# Patient Record
Sex: MALE | Race: WHITE | NOT HISPANIC OR LATINO | Employment: FULL TIME | URBAN - METROPOLITAN AREA
[De-identification: names, ages, dates, MRNs, and addresses within clinical notes are randomized per-mention and may not be internally consistent; named-entity substitution may affect disease eponyms.]

---

## 2017-01-03 ENCOUNTER — GENERIC CONVERSION - ENCOUNTER (OUTPATIENT)
Dept: OTHER | Facility: OTHER | Age: 37
End: 2017-01-03

## 2017-01-03 ENCOUNTER — APPOINTMENT (OUTPATIENT)
Dept: LAB | Facility: MEDICAL CENTER | Age: 37
End: 2017-01-03
Payer: COMMERCIAL

## 2017-01-03 ENCOUNTER — ALLSCRIPTS OFFICE VISIT (OUTPATIENT)
Dept: OTHER | Facility: OTHER | Age: 37
End: 2017-01-03

## 2017-01-03 ENCOUNTER — HOSPITAL ENCOUNTER (OUTPATIENT)
Dept: MRI IMAGING | Facility: HOSPITAL | Age: 37
Discharge: HOME | End: 2017-01-03
Payer: COMMERCIAL

## 2017-01-03 DIAGNOSIS — S20.219A CONTUSION OF FRONT WALL OF THORAX: ICD-10-CM

## 2017-01-03 DIAGNOSIS — K76.9 LIVER LESION: ICD-10-CM

## 2017-01-03 DIAGNOSIS — C73 MALIGNANT NEOPLASM OF THYROID GLAND (HCC): ICD-10-CM

## 2017-01-03 LAB
ALBUMIN SERPL BCP-MCNC: 4.4 G/DL (ref 3.5–5)
ALP SERPL-CCNC: 104 U/L (ref 46–116)
ALT SERPL W P-5'-P-CCNC: 50 U/L (ref 12–78)
ANION GAP SERPL CALCULATED.3IONS-SCNC: 5 MMOL/L (ref 4–13)
AST SERPL W P-5'-P-CCNC: 15 U/L (ref 5–45)
BASOPHILS # BLD AUTO: 0.02 THOUSANDS/ΜL (ref 0–0.1)
BASOPHILS NFR BLD AUTO: 0 % (ref 0–1)
BILIRUB SERPL-MCNC: 0.4 MG/DL (ref 0.2–1)
BUN SERPL-MCNC: 19 MG/DL (ref 5–25)
CA-I BLD-SCNC: 1.19 MMOL/L (ref 1.12–1.32)
CALCIUM SERPL-MCNC: 9.2 MG/DL (ref 8.3–10.1)
CHLORIDE SERPL-SCNC: 105 MMOL/L (ref 100–108)
CO2 SERPL-SCNC: 28 MMOL/L (ref 21–32)
CREAT SERPL-MCNC: 0.84 MG/DL (ref 0.6–1.3)
EOSINOPHIL # BLD AUTO: 0.28 THOUSAND/ΜL (ref 0–0.61)
EOSINOPHIL NFR BLD AUTO: 4 % (ref 0–6)
ERYTHROCYTE [DISTWIDTH] IN BLOOD BY AUTOMATED COUNT: 12 % (ref 11.6–15.1)
GFR SERPL CREATININE-BSD FRML MDRD: >60 ML/MIN/1.73SQ M
GLUCOSE SERPL-MCNC: 104 MG/DL (ref 65–140)
HCT VFR BLD AUTO: 50.8 % (ref 36.5–49.3)
HGB BLD-MCNC: 17.4 G/DL (ref 12–17)
LYMPHOCYTES # BLD AUTO: 2.22 THOUSANDS/ΜL (ref 0.6–4.47)
LYMPHOCYTES NFR BLD AUTO: 31 % (ref 14–44)
MCH RBC QN AUTO: 29.7 PG (ref 26.8–34.3)
MCHC RBC AUTO-ENTMCNC: 34.3 G/DL (ref 31.4–37.4)
MCV RBC AUTO: 87 FL (ref 82–98)
MONOCYTES # BLD AUTO: 0.37 THOUSAND/ΜL (ref 0.17–1.22)
MONOCYTES NFR BLD AUTO: 5 % (ref 4–12)
NEUTROPHILS # BLD AUTO: 4.21 THOUSANDS/ΜL (ref 1.85–7.62)
NEUTS SEG NFR BLD AUTO: 60 % (ref 43–75)
NRBC BLD AUTO-RTO: 0 /100 WBCS
PLATELET # BLD AUTO: 268 THOUSANDS/UL (ref 149–390)
PMV BLD AUTO: 10 FL (ref 8.9–12.7)
POTASSIUM SERPL-SCNC: 4.1 MMOL/L (ref 3.5–5.3)
PROT SERPL-MCNC: 7.7 G/DL (ref 6.4–8.2)
RBC # BLD AUTO: 5.85 MILLION/UL (ref 3.88–5.62)
SODIUM SERPL-SCNC: 138 MMOL/L (ref 136–145)
TSH SERPL DL<=0.05 MIU/L-ACNC: 3.05 UIU/ML (ref 0.36–3.74)
WBC # BLD AUTO: 7.16 THOUSAND/UL (ref 4.31–10.16)

## 2017-01-03 PROCEDURE — 74183 MRI ABD W/O CNTR FLWD CNTR: CPT

## 2017-01-03 PROCEDURE — 82308 ASSAY OF CALCITONIN: CPT

## 2017-01-03 PROCEDURE — 85025 COMPLETE CBC W/AUTO DIFF WBC: CPT

## 2017-01-03 PROCEDURE — 82330 ASSAY OF CALCIUM: CPT

## 2017-01-03 PROCEDURE — 80053 COMPREHEN METABOLIC PANEL: CPT

## 2017-01-03 PROCEDURE — 84443 ASSAY THYROID STIM HORMONE: CPT

## 2017-01-03 PROCEDURE — 36415 COLL VENOUS BLD VENIPUNCTURE: CPT

## 2017-01-03 PROCEDURE — A9585 GADOBUTROL INJECTION: HCPCS | Performed by: FAMILY MEDICINE

## 2017-01-03 RX ADMIN — GADOBUTROL 10 ML: 604.72 INJECTION INTRAVENOUS at 16:10

## 2017-01-04 ENCOUNTER — GENERIC CONVERSION - ENCOUNTER (OUTPATIENT)
Dept: OTHER | Facility: OTHER | Age: 37
End: 2017-01-04

## 2017-01-04 LAB — CALCIT SERPL-MCNC: <2 PG/ML (ref 0–8.4)

## 2017-01-06 ENCOUNTER — ALLSCRIPTS OFFICE VISIT (OUTPATIENT)
Dept: OTHER | Facility: OTHER | Age: 37
End: 2017-01-06

## 2017-01-11 ENCOUNTER — HOSPITAL ENCOUNTER (OUTPATIENT)
Dept: RADIOLOGY | Facility: MEDICAL CENTER | Age: 37
Discharge: HOME | End: 2017-01-11
Payer: COMMERCIAL

## 2017-01-11 DIAGNOSIS — C73 MALIGNANT NEOPLASM OF THYROID GLAND (HCC): ICD-10-CM

## 2017-01-11 PROCEDURE — 76536 US EXAM OF HEAD AND NECK: CPT

## 2017-01-22 ENCOUNTER — ANESTHESIA EVENT (OUTPATIENT)
Dept: PERIOP | Facility: HOSPITAL | Age: 37
End: 2017-01-22
Payer: COMMERCIAL

## 2017-01-23 ENCOUNTER — ANESTHESIA (OUTPATIENT)
Dept: PERIOP | Facility: HOSPITAL | Age: 37
End: 2017-01-23
Payer: COMMERCIAL

## 2017-01-23 ENCOUNTER — HOSPITAL ENCOUNTER (OUTPATIENT)
Facility: HOSPITAL | Age: 37
Discharge: HOME | End: 2017-01-24
Attending: SURGERY | Admitting: SURGERY
Payer: COMMERCIAL

## 2017-01-23 DIAGNOSIS — C73 MALIGNANT NEOPLASM OF THYROID GLAND (HCC): ICD-10-CM

## 2017-01-23 LAB
PTH-INTACT SERPL-MCNC: 43 PG/ML (ref 14–72)
PTH-INTACT SERPL-MCNC: 81.5 PG/ML (ref 14–72)

## 2017-01-23 PROCEDURE — 88307 TISSUE EXAM BY PATHOLOGIST: CPT | Performed by: SURGERY

## 2017-01-23 PROCEDURE — 83970 ASSAY OF PARATHORMONE: CPT | Performed by: SURGERY

## 2017-01-23 RX ORDER — SODIUM CHLORIDE, SODIUM LACTATE, POTASSIUM CHLORIDE, CALCIUM CHLORIDE 600; 310; 30; 20 MG/100ML; MG/100ML; MG/100ML; MG/100ML
INJECTION, SOLUTION INTRAVENOUS CONTINUOUS PRN
Status: DISCONTINUED | OUTPATIENT
Start: 2017-01-23 | End: 2017-01-23 | Stop reason: SURG

## 2017-01-23 RX ORDER — SUCCINYLCHOLINE CHLORIDE 20 MG/ML
INJECTION INTRAMUSCULAR; INTRAVENOUS AS NEEDED
Status: DISCONTINUED | OUTPATIENT
Start: 2017-01-23 | End: 2017-01-23 | Stop reason: SURG

## 2017-01-23 RX ORDER — ROCURONIUM BROMIDE 10 MG/ML
INJECTION, SOLUTION INTRAVENOUS AS NEEDED
Status: DISCONTINUED | OUTPATIENT
Start: 2017-01-23 | End: 2017-01-23 | Stop reason: SURG

## 2017-01-23 RX ORDER — ONDANSETRON 2 MG/ML
4 INJECTION INTRAMUSCULAR; INTRAVENOUS EVERY 6 HOURS PRN
Status: DISCONTINUED | OUTPATIENT
Start: 2017-01-23 | End: 2017-01-24 | Stop reason: HOSPADM

## 2017-01-23 RX ORDER — IBUPROFEN 400 MG/1
200 TABLET ORAL DAILY
Status: DISCONTINUED | OUTPATIENT
Start: 2017-01-23 | End: 2017-01-24 | Stop reason: HOSPADM

## 2017-01-23 RX ORDER — ACETAMINOPHEN 325 MG/1
650 TABLET ORAL EVERY 4 HOURS PRN
Status: DISCONTINUED | OUTPATIENT
Start: 2017-01-23 | End: 2017-01-24 | Stop reason: HOSPADM

## 2017-01-23 RX ORDER — PROPOFOL 10 MG/ML
INJECTION, EMULSION INTRAVENOUS AS NEEDED
Status: DISCONTINUED | OUTPATIENT
Start: 2017-01-23 | End: 2017-01-23 | Stop reason: SURG

## 2017-01-23 RX ORDER — BUPIVACAINE HYDROCHLORIDE 2.5 MG/ML
INJECTION, SOLUTION INFILTRATION; PERINEURAL AS NEEDED
Status: DISCONTINUED | OUTPATIENT
Start: 2017-01-23 | End: 2017-01-23 | Stop reason: HOSPADM

## 2017-01-23 RX ORDER — GLYCOPYRROLATE 0.2 MG/ML
INJECTION INTRAMUSCULAR; INTRAVENOUS AS NEEDED
Status: DISCONTINUED | OUTPATIENT
Start: 2017-01-23 | End: 2017-01-23 | Stop reason: SURG

## 2017-01-23 RX ORDER — LABETALOL HYDROCHLORIDE 5 MG/ML
INJECTION, SOLUTION INTRAVENOUS AS NEEDED
Status: DISCONTINUED | OUTPATIENT
Start: 2017-01-23 | End: 2017-01-23 | Stop reason: SURG

## 2017-01-23 RX ORDER — METOCLOPRAMIDE HYDROCHLORIDE 5 MG/ML
INJECTION INTRAMUSCULAR; INTRAVENOUS AS NEEDED
Status: DISCONTINUED | OUTPATIENT
Start: 2017-01-23 | End: 2017-01-23 | Stop reason: SURG

## 2017-01-23 RX ORDER — ONDANSETRON 2 MG/ML
INJECTION INTRAMUSCULAR; INTRAVENOUS AS NEEDED
Status: DISCONTINUED | OUTPATIENT
Start: 2017-01-23 | End: 2017-01-23 | Stop reason: SURG

## 2017-01-23 RX ORDER — OXYCODONE HYDROCHLORIDE 10 MG/1
10 TABLET ORAL EVERY 4 HOURS PRN
Status: DISCONTINUED | OUTPATIENT
Start: 2017-01-23 | End: 2017-01-24 | Stop reason: HOSPADM

## 2017-01-23 RX ORDER — FENTANYL CITRATE 50 UG/ML
INJECTION, SOLUTION INTRAMUSCULAR; INTRAVENOUS AS NEEDED
Status: DISCONTINUED | OUTPATIENT
Start: 2017-01-23 | End: 2017-01-23 | Stop reason: SURG

## 2017-01-23 RX ORDER — ONDANSETRON 2 MG/ML
4 INJECTION INTRAMUSCULAR; INTRAVENOUS EVERY 8 HOURS PRN
Status: DISCONTINUED | OUTPATIENT
Start: 2017-01-23 | End: 2017-01-23 | Stop reason: HOSPADM

## 2017-01-23 RX ORDER — ALBUTEROL SULFATE 2.5 MG/3ML
2.5 SOLUTION RESPIRATORY (INHALATION) EVERY 4 HOURS PRN
Status: DISCONTINUED | OUTPATIENT
Start: 2017-01-23 | End: 2017-01-23 | Stop reason: HOSPADM

## 2017-01-23 RX ORDER — DEXMEDETOMIDINE HYDROCHLORIDE 100 UG/ML
INJECTION, SOLUTION INTRAVENOUS AS NEEDED
Status: DISCONTINUED | OUTPATIENT
Start: 2017-01-23 | End: 2017-01-23 | Stop reason: SURG

## 2017-01-23 RX ORDER — SODIUM CHLORIDE 9 MG/ML
75 INJECTION, SOLUTION INTRAVENOUS CONTINUOUS
Status: DISCONTINUED | OUTPATIENT
Start: 2017-01-23 | End: 2017-01-24 | Stop reason: HOSPADM

## 2017-01-23 RX ORDER — LIDOCAINE HYDROCHLORIDE 10 MG/ML
INJECTION, SOLUTION INFILTRATION; PERINEURAL AS NEEDED
Status: DISCONTINUED | OUTPATIENT
Start: 2017-01-23 | End: 2017-01-23 | Stop reason: SURG

## 2017-01-23 RX ORDER — LABETALOL HYDROCHLORIDE 5 MG/ML
5 INJECTION, SOLUTION INTRAVENOUS
Status: DISCONTINUED | OUTPATIENT
Start: 2017-01-23 | End: 2017-01-23 | Stop reason: HOSPADM

## 2017-01-23 RX ORDER — FENTANYL CITRATE/PF 50 MCG/ML
50 SYRINGE (ML) INJECTION
Status: COMPLETED | OUTPATIENT
Start: 2017-01-23 | End: 2017-01-23

## 2017-01-23 RX ORDER — LEVOTHYROXINE SODIUM 175 UG/1
175 TABLET ORAL
Qty: 30 TABLET | Refills: 1 | Status: CANCELLED | OUTPATIENT
Start: 2017-01-23 | End: 2017-02-22

## 2017-01-23 RX ORDER — METHOCARBAMOL 500 MG/1
500 TABLET, FILM COATED ORAL 4 TIMES DAILY PRN
Status: DISCONTINUED | OUTPATIENT
Start: 2017-01-23 | End: 2017-01-24 | Stop reason: HOSPADM

## 2017-01-23 RX ORDER — DIPHENHYDRAMINE HCL 25 MG
50 TABLET ORAL
Status: DISCONTINUED | OUTPATIENT
Start: 2017-01-23 | End: 2017-01-24 | Stop reason: HOSPADM

## 2017-01-23 RX ORDER — ARIPIPRAZOLE 10 MG/1
10 TABLET ORAL
Status: DISCONTINUED | OUTPATIENT
Start: 2017-01-23 | End: 2017-01-24 | Stop reason: HOSPADM

## 2017-01-23 RX ORDER — OXYCODONE HYDROCHLORIDE AND ACETAMINOPHEN 5; 325 MG/1; MG/1
1 TABLET ORAL EVERY 4 HOURS PRN
Status: DISCONTINUED | OUTPATIENT
Start: 2017-01-23 | End: 2017-01-24 | Stop reason: HOSPADM

## 2017-01-23 RX ORDER — NAPROXEN 500 MG/1
500 TABLET ORAL 2 TIMES DAILY PRN
Status: DISCONTINUED | OUTPATIENT
Start: 2017-01-23 | End: 2017-01-24 | Stop reason: HOSPADM

## 2017-01-23 RX ORDER — SODIUM CHLORIDE, SODIUM LACTATE, POTASSIUM CHLORIDE, CALCIUM CHLORIDE 600; 310; 30; 20 MG/100ML; MG/100ML; MG/100ML; MG/100ML
100 INJECTION, SOLUTION INTRAVENOUS CONTINUOUS
Status: DISCONTINUED | OUTPATIENT
Start: 2017-01-23 | End: 2017-01-24 | Stop reason: HOSPADM

## 2017-01-23 RX ORDER — LORAZEPAM 2 MG/ML
0.5 INJECTION INTRAMUSCULAR EVERY 4 HOURS PRN
Status: DISCONTINUED | OUTPATIENT
Start: 2017-01-23 | End: 2017-01-24 | Stop reason: HOSPADM

## 2017-01-23 RX ORDER — MEPERIDINE HYDROCHLORIDE 25 MG/ML
25 INJECTION INTRAMUSCULAR; INTRAVENOUS; SUBCUTANEOUS AS NEEDED
Status: DISCONTINUED | OUTPATIENT
Start: 2017-01-23 | End: 2017-01-23 | Stop reason: HOSPADM

## 2017-01-23 RX ORDER — MORPHINE SULFATE 4 MG/ML
4 INJECTION, SOLUTION INTRAMUSCULAR; INTRAVENOUS
Status: DISCONTINUED | OUTPATIENT
Start: 2017-01-23 | End: 2017-01-24 | Stop reason: HOSPADM

## 2017-01-23 RX ADMIN — LORAZEPAM 0.5 MG: 2 INJECTION INTRAMUSCULAR; INTRAVENOUS at 21:09

## 2017-01-23 RX ADMIN — DEXMEDETOMIDINE HYDROCHLORIDE 5 MCG: 100 INJECTION, SOLUTION INTRAVENOUS at 09:10

## 2017-01-23 RX ADMIN — FENTANYL CITRATE 50 MCG: 50 INJECTION, SOLUTION INTRAMUSCULAR; INTRAVENOUS at 09:45

## 2017-01-23 RX ADMIN — NEOSTIGMINE METHYLSULFATE 3 MG: 1 INJECTION INTRAMUSCULAR; INTRAVENOUS; SUBCUTANEOUS at 09:36

## 2017-01-23 RX ADMIN — FENTANYL CITRATE 50 MCG: 50 INJECTION INTRAMUSCULAR; INTRAVENOUS at 10:16

## 2017-01-23 RX ADMIN — DEXMEDETOMIDINE HYDROCHLORIDE 5 MCG: 100 INJECTION, SOLUTION INTRAVENOUS at 09:21

## 2017-01-23 RX ADMIN — OXYCODONE HYDROCHLORIDE AND ACETAMINOPHEN 1 TABLET: 5; 325 TABLET ORAL at 21:57

## 2017-01-23 RX ADMIN — LABETALOL HYDROCHLORIDE 5 MG: 5 INJECTION, SOLUTION INTRAVENOUS at 10:58

## 2017-01-23 RX ADMIN — SODIUM CHLORIDE, SODIUM LACTATE, POTASSIUM CHLORIDE, AND CALCIUM CHLORIDE: .6; .31; .03; .02 INJECTION, SOLUTION INTRAVENOUS at 09:36

## 2017-01-23 RX ADMIN — IBUPROFEN 200 MG: 400 TABLET ORAL at 12:03

## 2017-01-23 RX ADMIN — ONDANSETRON 4 MG: 2 INJECTION INTRAMUSCULAR; INTRAVENOUS at 10:25

## 2017-01-23 RX ADMIN — OXYCODONE HYDROCHLORIDE AND ACETAMINOPHEN 1 TABLET: 5; 325 TABLET ORAL at 17:22

## 2017-01-23 RX ADMIN — LABETALOL HYDROCHLORIDE 5 MG: 5 INJECTION, SOLUTION INTRAVENOUS at 10:48

## 2017-01-23 RX ADMIN — DEXMEDETOMIDINE HYDROCHLORIDE 5 MCG: 100 INJECTION, SOLUTION INTRAVENOUS at 07:40

## 2017-01-23 RX ADMIN — FENTANYL CITRATE 50 MCG: 50 INJECTION, SOLUTION INTRAMUSCULAR; INTRAVENOUS at 08:30

## 2017-01-23 RX ADMIN — OXYCODONE HYDROCHLORIDE AND ACETAMINOPHEN 1 TABLET: 5; 325 TABLET ORAL at 12:02

## 2017-01-23 RX ADMIN — DEXAMETHASONE SODIUM PHOSPHATE 10 MG: 10 INJECTION INTRAMUSCULAR; INTRAVENOUS at 08:25

## 2017-01-23 RX ADMIN — DEXMEDETOMIDINE HYDROCHLORIDE 5 MCG: 100 INJECTION, SOLUTION INTRAVENOUS at 08:40

## 2017-01-23 RX ADMIN — ONDANSETRON 4 MG: 2 INJECTION INTRAMUSCULAR; INTRAVENOUS at 09:34

## 2017-01-23 RX ADMIN — LIDOCAINE HYDROCHLORIDE 50 MG: 10 INJECTION, SOLUTION INFILTRATION; PERINEURAL at 07:49

## 2017-01-23 RX ADMIN — LABETALOL HYDROCHLORIDE 5 MG: 5 INJECTION, SOLUTION INTRAVENOUS at 09:06

## 2017-01-23 RX ADMIN — LABETALOL HYDROCHLORIDE 5 MG: 5 INJECTION, SOLUTION INTRAVENOUS at 08:45

## 2017-01-23 RX ADMIN — DIPHENHYDRAMINE HCL 50 MG: 25 TABLET ORAL at 21:56

## 2017-01-23 RX ADMIN — DEXMEDETOMIDINE HYDROCHLORIDE 5 MCG: 100 INJECTION, SOLUTION INTRAVENOUS at 08:25

## 2017-01-23 RX ADMIN — DEXMEDETOMIDINE HYDROCHLORIDE 5 MCG: 100 INJECTION, SOLUTION INTRAVENOUS at 09:03

## 2017-01-23 RX ADMIN — ROCURONIUM BROMIDE 30 MG: 10 INJECTION, SOLUTION INTRAVENOUS at 07:49

## 2017-01-23 RX ADMIN — METOCLOPRAMIDE HYDROCHLORIDE 10 MG: 5 INJECTION INTRAMUSCULAR; INTRAVENOUS at 08:25

## 2017-01-23 RX ADMIN — LEVOTHYROXINE SODIUM 175 MCG: 175 TABLET ORAL at 17:22

## 2017-01-23 RX ADMIN — FENTANYL CITRATE 50 MCG: 50 INJECTION INTRAMUSCULAR; INTRAVENOUS at 10:57

## 2017-01-23 RX ADMIN — SODIUM CHLORIDE, SODIUM LACTATE, POTASSIUM CHLORIDE, AND CALCIUM CHLORIDE: .6; .31; .03; .02 INJECTION, SOLUTION INTRAVENOUS at 07:49

## 2017-01-23 RX ADMIN — SUCCINYLCHOLINE CHLORIDE 120 MG: 20 INJECTION, SOLUTION INTRAMUSCULAR; INTRAVENOUS at 07:49

## 2017-01-23 RX ADMIN — PROPOFOL 280 MG: 10 INJECTION, EMULSION INTRAVENOUS at 07:50

## 2017-01-23 RX ADMIN — MENTHOL 5.4 MG: 5.4 LOZENGE ORAL at 21:57

## 2017-01-23 RX ADMIN — GLYCOPYRROLATE 0.6 MG: 0.2 INJECTION INTRAMUSCULAR; INTRAVENOUS at 09:36

## 2017-01-23 RX ADMIN — FENTANYL CITRATE 50 MCG: 50 INJECTION INTRAMUSCULAR; INTRAVENOUS at 10:25

## 2017-01-23 RX ADMIN — DEXMEDETOMIDINE HYDROCHLORIDE 5 MCG: 100 INJECTION, SOLUTION INTRAVENOUS at 07:55

## 2017-01-23 RX ADMIN — LIDOCAINE HYDROCHLORIDE 40 MG: 10 INJECTION, SOLUTION INFILTRATION; PERINEURAL at 07:55

## 2017-01-23 RX ADMIN — GLYCOPYRROLATE 0.2 MG: 0.2 INJECTION INTRAMUSCULAR; INTRAVENOUS at 08:59

## 2017-01-23 RX ADMIN — SODIUM CHLORIDE 75 ML/HR: 0.9 INJECTION, SOLUTION INTRAVENOUS at 11:23

## 2017-01-23 RX ADMIN — PROPOFOL 50 MG: 10 INJECTION, EMULSION INTRAVENOUS at 09:09

## 2017-01-23 RX ADMIN — LORAZEPAM 0.5 MG: 2 INJECTION INTRAMUSCULAR; INTRAVENOUS at 12:38

## 2017-01-23 RX ADMIN — ARIPIPRAZOLE 10 MG: 10 TABLET ORAL at 17:22

## 2017-01-23 RX ADMIN — DEXMEDETOMIDINE HYDROCHLORIDE 5 MCG: 100 INJECTION, SOLUTION INTRAVENOUS at 08:59

## 2017-01-23 RX ADMIN — FENTANYL CITRATE 100 MCG: 50 INJECTION, SOLUTION INTRAMUSCULAR; INTRAVENOUS at 07:49

## 2017-01-23 RX ADMIN — FENTANYL CITRATE 50 MCG: 50 INJECTION INTRAMUSCULAR; INTRAVENOUS at 10:42

## 2017-01-23 RX ADMIN — CEFAZOLIN SODIUM 2000 MG: 2 SOLUTION INTRAVENOUS at 07:47

## 2017-01-24 VITALS
DIASTOLIC BLOOD PRESSURE: 68 MMHG | BODY MASS INDEX: 34.23 KG/M2 | SYSTOLIC BLOOD PRESSURE: 140 MMHG | HEIGHT: 71 IN | RESPIRATION RATE: 18 BRPM | TEMPERATURE: 98.1 F | OXYGEN SATURATION: 94 % | HEART RATE: 78 BPM | WEIGHT: 244.49 LBS

## 2017-01-24 LAB — CALCIUM SERPL-MCNC: 7.5 MG/DL (ref 8.3–10.1)

## 2017-01-24 PROCEDURE — 82310 ASSAY OF CALCIUM: CPT | Performed by: SURGERY

## 2017-01-24 PROCEDURE — 90686 IIV4 VACC NO PRSV 0.5 ML IM: CPT | Performed by: SURGERY

## 2017-01-24 RX ADMIN — OXYCODONE HYDROCHLORIDE 10 MG: 10 TABLET ORAL at 06:06

## 2017-01-24 RX ADMIN — MORPHINE SULFATE 4 MG: 4 INJECTION, SOLUTION INTRAMUSCULAR; INTRAVENOUS at 09:56

## 2017-01-24 RX ADMIN — LEVOTHYROXINE SODIUM 175 MCG: 175 TABLET ORAL at 06:06

## 2017-01-24 RX ADMIN — IBUPROFEN 200 MG: 400 TABLET ORAL at 09:20

## 2017-01-24 RX ADMIN — MENTHOL 5.4 MG: 5.4 LOZENGE ORAL at 09:20

## 2017-01-24 RX ADMIN — INFLUENZA VIRUS VACCINE 0.5 ML: 15; 15; 15; 15 SUSPENSION INTRAMUSCULAR at 11:03

## 2017-01-27 ENCOUNTER — ALLSCRIPTS OFFICE VISIT (OUTPATIENT)
Dept: OTHER | Facility: OTHER | Age: 37
End: 2017-01-27

## 2017-01-27 ENCOUNTER — APPOINTMENT (OUTPATIENT)
Dept: LAB | Facility: CLINIC | Age: 37
End: 2017-01-27
Payer: COMMERCIAL

## 2017-01-27 ENCOUNTER — TRANSCRIBE ORDERS (OUTPATIENT)
Dept: LAB | Facility: CLINIC | Age: 37
End: 2017-01-27

## 2017-01-27 ENCOUNTER — GENERIC CONVERSION - ENCOUNTER (OUTPATIENT)
Dept: OTHER | Facility: OTHER | Age: 37
End: 2017-01-27

## 2017-01-27 DIAGNOSIS — E55.9 VITAMIN D DEFICIENCY: ICD-10-CM

## 2017-01-27 DIAGNOSIS — C73 MALIGNANT NEOPLASM OF THYROID GLAND (HCC): ICD-10-CM

## 2017-01-27 DIAGNOSIS — E89.0 POSTPROCEDURAL HYPOTHYROIDISM: ICD-10-CM

## 2017-01-27 DIAGNOSIS — E83.51 HYPOCALCEMIA: ICD-10-CM

## 2017-01-27 LAB
25(OH)D3 SERPL-MCNC: 12.3 NG/ML (ref 30–100)
ALBUMIN SERPL BCP-MCNC: 4.1 G/DL (ref 3.5–5)
ANION GAP SERPL CALCULATED.3IONS-SCNC: 10 MMOL/L (ref 4–13)
BUN SERPL-MCNC: 21 MG/DL (ref 5–25)
CALCIUM SERPL-MCNC: 9.2 MG/DL (ref 8.3–10.1)
CHLORIDE SERPL-SCNC: 105 MMOL/L (ref 100–108)
CO2 SERPL-SCNC: 28 MMOL/L (ref 21–32)
CREAT SERPL-MCNC: 0.85 MG/DL (ref 0.6–1.3)
GFR SERPL CREATININE-BSD FRML MDRD: >60 ML/MIN/1.73SQ M
GLUCOSE SERPL-MCNC: 125 MG/DL (ref 65–140)
PHOSPHATE SERPL-MCNC: 3.5 MG/DL (ref 2.7–4.5)
POTASSIUM SERPL-SCNC: 3.7 MMOL/L (ref 3.5–5.3)
PTH-INTACT SERPL-MCNC: 23.2 PG/ML (ref 14–72)
SODIUM SERPL-SCNC: 143 MMOL/L (ref 136–145)
T4 FREE SERPL-MCNC: 1.49 NG/DL (ref 0.76–1.46)
TSH SERPL DL<=0.05 MIU/L-ACNC: 0.77 UIU/ML (ref 0.36–3.74)

## 2017-01-27 PROCEDURE — 83970 ASSAY OF PARATHORMONE: CPT

## 2017-01-27 PROCEDURE — 80069 RENAL FUNCTION PANEL: CPT

## 2017-01-27 PROCEDURE — 84432 ASSAY OF THYROGLOBULIN: CPT

## 2017-01-27 PROCEDURE — 84439 ASSAY OF FREE THYROXINE: CPT

## 2017-01-27 PROCEDURE — 84443 ASSAY THYROID STIM HORMONE: CPT

## 2017-01-27 PROCEDURE — 82306 VITAMIN D 25 HYDROXY: CPT

## 2017-01-27 PROCEDURE — 86800 THYROGLOBULIN ANTIBODY: CPT

## 2017-01-27 PROCEDURE — 36415 COLL VENOUS BLD VENIPUNCTURE: CPT

## 2017-01-28 LAB
THYROGLOB AB SERPL-ACNC: <1 IU/ML (ref 0–0.9)
THYROGLOB SERPL-MCNC: 9.5 NG/ML (ref 1.4–29.2)

## 2017-02-01 ENCOUNTER — GENERIC CONVERSION - ENCOUNTER (OUTPATIENT)
Dept: OTHER | Facility: OTHER | Age: 37
End: 2017-02-01

## 2017-02-03 ENCOUNTER — HOSPITAL ENCOUNTER (OUTPATIENT)
Dept: RADIOLOGY | Age: 37
Discharge: HOME | End: 2017-02-03
Payer: COMMERCIAL

## 2017-02-03 DIAGNOSIS — C73 THYROID CANCER (HCC): ICD-10-CM

## 2017-02-03 PROCEDURE — 99201 HB OFFICE/OUTPATIENT VISIT NEW (BRIEF): CPT

## 2017-02-14 ENCOUNTER — HOSPITAL ENCOUNTER (OUTPATIENT)
Dept: RADIOLOGY | Age: 37
Discharge: HOME | End: 2017-02-14
Payer: COMMERCIAL

## 2017-02-14 DIAGNOSIS — C73 MALIGNANT NEOPLASM OF THYROID GLAND (HCC): ICD-10-CM

## 2017-02-14 RX ADMIN — THYROTROPIN ALFA 0.9 MG: KIT at 09:15

## 2017-02-15 ENCOUNTER — HOSPITAL ENCOUNTER (OUTPATIENT)
Dept: RADIOLOGY | Age: 37
Discharge: HOME | End: 2017-02-15
Attending: INTERNAL MEDICINE
Payer: COMMERCIAL

## 2017-02-15 ENCOUNTER — HOSPITAL ENCOUNTER (OUTPATIENT)
Dept: RADIOLOGY | Age: 37
Discharge: HOME | End: 2017-02-15
Payer: COMMERCIAL

## 2017-02-15 RX ADMIN — THYROTROPIN ALFA 0.9 MG: KIT at 09:19

## 2017-02-16 ENCOUNTER — APPOINTMENT (OUTPATIENT)
Dept: LAB | Age: 37
End: 2017-02-16
Payer: COMMERCIAL

## 2017-02-16 ENCOUNTER — TRANSCRIBE ORDERS (OUTPATIENT)
Dept: ADMINISTRATIVE | Age: 37
End: 2017-02-16

## 2017-02-16 ENCOUNTER — HOSPITAL ENCOUNTER (OUTPATIENT)
Dept: RADIOLOGY | Age: 37
Discharge: HOME | End: 2017-02-16
Attending: INTERNAL MEDICINE
Payer: COMMERCIAL

## 2017-02-16 ENCOUNTER — HOSPITAL ENCOUNTER (OUTPATIENT)
Dept: RADIOLOGY | Facility: HOSPITAL | Age: 37
Discharge: HOME | End: 2017-02-16
Attending: INTERNAL MEDICINE
Payer: COMMERCIAL

## 2017-02-16 DIAGNOSIS — E83.51 HYPOCALCEMIA: Primary | ICD-10-CM

## 2017-02-16 DIAGNOSIS — E89.0 POSTSURGICAL HYPOTHYROIDISM: ICD-10-CM

## 2017-02-16 DIAGNOSIS — C73 MALIGNANT NEOPLASM OF THYROID GLAND (HCC): ICD-10-CM

## 2017-02-16 DIAGNOSIS — E55.9 UNSPECIFIED VITAMIN D DEFICIENCY: ICD-10-CM

## 2017-02-16 DIAGNOSIS — E83.51 HYPOCALCEMIA: ICD-10-CM

## 2017-02-16 LAB — TSH SERPL DL<=0.05 MIU/L-ACNC: 46.2 UIU/ML (ref 0.36–3.74)

## 2017-02-16 PROCEDURE — 79005 NUCLEAR RX ORAL ADMIN: CPT

## 2017-02-16 PROCEDURE — 36415 COLL VENOUS BLD VENIPUNCTURE: CPT

## 2017-02-16 PROCEDURE — 84432 ASSAY OF THYROGLOBULIN: CPT

## 2017-02-16 PROCEDURE — A9509 IODINE I-123 SOD IODIDE MIL: HCPCS

## 2017-02-16 PROCEDURE — 86800 THYROGLOBULIN ANTIBODY: CPT

## 2017-02-16 PROCEDURE — 78018 THYROID MET IMAGING BODY: CPT

## 2017-02-16 PROCEDURE — 84443 ASSAY THYROID STIM HORMONE: CPT

## 2017-02-16 PROCEDURE — A9517 I131 IODIDE CAP, RX: HCPCS

## 2017-02-16 PROCEDURE — 96372 THER/PROPH/DIAG INJ SC/IM: CPT

## 2017-02-17 LAB
THYROGLOB AB SERPL-ACNC: <1 IU/ML (ref 0–0.9)
THYROGLOB SERPL-MCNC: 5 NG/ML (ref 1.4–29.2)

## 2017-02-22 ENCOUNTER — HOSPITAL ENCOUNTER (OUTPATIENT)
Dept: RADIOLOGY | Age: 37
Discharge: HOME | End: 2017-02-22
Attending: INTERNAL MEDICINE
Payer: COMMERCIAL

## 2017-02-22 DIAGNOSIS — C73 MALIGNANT NEOPLASM OF THYROID GLAND (HCC): ICD-10-CM

## 2017-02-22 PROCEDURE — 78018 THYROID MET IMAGING BODY: CPT

## 2017-02-28 ENCOUNTER — GENERIC CONVERSION - ENCOUNTER (OUTPATIENT)
Dept: OTHER | Facility: OTHER | Age: 37
End: 2017-02-28

## 2017-03-10 ENCOUNTER — GENERIC CONVERSION - ENCOUNTER (OUTPATIENT)
Dept: OTHER | Facility: OTHER | Age: 37
End: 2017-03-10

## 2017-03-10 ENCOUNTER — APPOINTMENT (EMERGENCY)
Dept: RADIOLOGY | Facility: HOSPITAL | Age: 37
End: 2017-03-10
Payer: COMMERCIAL

## 2017-03-10 ENCOUNTER — APPOINTMENT (OUTPATIENT)
Dept: RADIOLOGY | Facility: HOSPITAL | Age: 37
End: 2017-03-10
Payer: COMMERCIAL

## 2017-03-10 ENCOUNTER — HOSPITAL ENCOUNTER (OUTPATIENT)
Facility: HOSPITAL | Age: 37
Setting detail: OBSERVATION
Discharge: HOME HEALTH | End: 2017-03-11
Attending: SURGERY | Admitting: SURGERY
Payer: COMMERCIAL

## 2017-03-10 DIAGNOSIS — R73.01 IMPAIRED FASTING GLUCOSE: ICD-10-CM

## 2017-03-10 DIAGNOSIS — E89.0 POSTPROCEDURAL HYPOTHYROIDISM: ICD-10-CM

## 2017-03-10 DIAGNOSIS — E07.89 OTHER SPECIFIED DISORDERS OF THYROID: ICD-10-CM

## 2017-03-10 DIAGNOSIS — S06.0X1A CONCUSSION, WITH LOSS OF CONSCIOUSNESS OF 30 MINUTES OR LESS, INITIAL ENCOUNTER: ICD-10-CM

## 2017-03-10 DIAGNOSIS — R45.851 SUICIDAL IDEATIONS: Primary | ICD-10-CM

## 2017-03-10 DIAGNOSIS — S01.01XA SCALP LACERATION, INITIAL ENCOUNTER: ICD-10-CM

## 2017-03-10 DIAGNOSIS — E55.9 VITAMIN D DEFICIENCY: ICD-10-CM

## 2017-03-10 DIAGNOSIS — F31.9 DEPRESSED BIPOLAR DISORDER (HCC): ICD-10-CM

## 2017-03-10 DIAGNOSIS — C73 MALIGNANT NEOPLASM OF THYROID GLAND (HCC): ICD-10-CM

## 2017-03-10 PROBLEM — F32.A DEPRESSION: Status: ACTIVE | Noted: 2017-03-10

## 2017-03-10 LAB
ABO GROUP BLD: NORMAL
ANION GAP SERPL CALCULATED.3IONS-SCNC: 8 MMOL/L (ref 4–13)
ATRIAL RATE: 115 BPM
BASE EXCESS BLDA CALC-SCNC: 0 MMOL/L (ref -2–3)
BLD GP AB SCN SERPL QL: NEGATIVE
BUN SERPL-MCNC: 15 MG/DL (ref 5–25)
CA-I BLD-SCNC: 1.11 MMOL/L (ref 1.12–1.32)
CALCIUM SERPL-MCNC: 8.7 MG/DL (ref 8.3–10.1)
CHLORIDE SERPL-SCNC: 108 MMOL/L (ref 100–108)
CO2 SERPL-SCNC: 25 MMOL/L (ref 21–32)
CREAT SERPL-MCNC: 0.9 MG/DL (ref 0.6–1.3)
GFR SERPL CREATININE-BSD FRML MDRD: >60 ML/MIN/1.73SQ M
GLUCOSE SERPL-MCNC: 120 MG/DL (ref 65–140)
GLUCOSE SERPL-MCNC: 122 MG/DL (ref 65–140)
HCO3 BLDA-SCNC: 23.5 MMOL/L (ref 24–30)
HCT VFR BLD CALC: 38 % (ref 36.5–49.3)
HGB BLDA-MCNC: 12.9 G/DL (ref 12–17)
P AXIS: 49 DEGREES
PCO2 BLD: 24 MMOL/L (ref 21–32)
PCO2 BLD: 32.7 MM HG (ref 42–50)
PH BLD: 7.46 [PH] (ref 7.3–7.4)
PO2 BLD: 59 MM HG (ref 35–45)
POTASSIUM BLD-SCNC: 3.9 MMOL/L (ref 3.5–5.3)
POTASSIUM SERPL-SCNC: 3.9 MMOL/L (ref 3.5–5.3)
PR INTERVAL: 128 MS
QRS AXIS: 57 DEGREES
QRSD INTERVAL: 86 MS
QT INTERVAL: 318 MS
QTC INTERVAL: 439 MS
RH BLD: NEGATIVE
SAO2 % BLD FROM PO2: 92 % (ref 95–98)
SODIUM BLD-SCNC: 141 MMOL/L (ref 136–145)
SODIUM SERPL-SCNC: 141 MMOL/L (ref 136–145)
SPECIMEN SOURCE: ABNORMAL
T WAVE AXIS: 21 DEGREES
VENTRICULAR RATE: 115 BPM

## 2017-03-10 PROCEDURE — 36415 COLL VENOUS BLD VENIPUNCTURE: CPT | Performed by: SURGERY

## 2017-03-10 PROCEDURE — 93005 ELECTROCARDIOGRAM TRACING: CPT

## 2017-03-10 PROCEDURE — 72125 CT NECK SPINE W/O DYE: CPT

## 2017-03-10 PROCEDURE — 99285 EMERGENCY DEPT VISIT HI MDM: CPT

## 2017-03-10 PROCEDURE — 96375 TX/PRO/DX INJ NEW DRUG ADDON: CPT

## 2017-03-10 PROCEDURE — 70450 CT HEAD/BRAIN W/O DYE: CPT

## 2017-03-10 PROCEDURE — 82947 ASSAY GLUCOSE BLOOD QUANT: CPT

## 2017-03-10 PROCEDURE — 73560 X-RAY EXAM OF KNEE 1 OR 2: CPT

## 2017-03-10 PROCEDURE — 86900 BLOOD TYPING SEROLOGIC ABO: CPT | Performed by: SURGERY

## 2017-03-10 PROCEDURE — 82330 ASSAY OF CALCIUM: CPT

## 2017-03-10 PROCEDURE — 96374 THER/PROPH/DIAG INJ IV PUSH: CPT

## 2017-03-10 PROCEDURE — 70486 CT MAXILLOFACIAL W/O DYE: CPT

## 2017-03-10 PROCEDURE — 90715 TDAP VACCINE 7 YRS/> IM: CPT | Performed by: SURGERY

## 2017-03-10 PROCEDURE — 84295 ASSAY OF SERUM SODIUM: CPT

## 2017-03-10 PROCEDURE — 82803 BLOOD GASES ANY COMBINATION: CPT

## 2017-03-10 PROCEDURE — 80048 BASIC METABOLIC PNL TOTAL CA: CPT | Performed by: SURGERY

## 2017-03-10 PROCEDURE — 73080 X-RAY EXAM OF ELBOW: CPT

## 2017-03-10 PROCEDURE — 71010 HB CHEST X-RAY 1 VIEW FRONTAL: CPT

## 2017-03-10 PROCEDURE — 90471 IMMUNIZATION ADMIN: CPT

## 2017-03-10 PROCEDURE — 85014 HEMATOCRIT: CPT

## 2017-03-10 PROCEDURE — 86901 BLOOD TYPING SEROLOGIC RH(D): CPT | Performed by: SURGERY

## 2017-03-10 PROCEDURE — 84132 ASSAY OF SERUM POTASSIUM: CPT

## 2017-03-10 PROCEDURE — 86850 RBC ANTIBODY SCREEN: CPT | Performed by: SURGERY

## 2017-03-10 RX ORDER — ONDANSETRON 2 MG/ML
4 INJECTION INTRAMUSCULAR; INTRAVENOUS EVERY 8 HOURS PRN
Status: DISCONTINUED | OUTPATIENT
Start: 2017-03-10 | End: 2017-03-11

## 2017-03-10 RX ORDER — ACETAMINOPHEN 325 MG/1
975 TABLET ORAL EVERY 6 HOURS PRN
Status: DISCONTINUED | OUTPATIENT
Start: 2017-03-10 | End: 2017-03-11 | Stop reason: HOSPADM

## 2017-03-10 RX ORDER — MECLIZINE HCL 12.5 MG/1
12.5 TABLET ORAL EVERY 8 HOURS PRN
Status: DISCONTINUED | OUTPATIENT
Start: 2017-03-10 | End: 2017-03-11 | Stop reason: HOSPADM

## 2017-03-10 RX ORDER — FLUOXETINE HYDROCHLORIDE 20 MG/1
20 CAPSULE ORAL DAILY
Status: DISCONTINUED | OUTPATIENT
Start: 2017-03-10 | End: 2017-03-11 | Stop reason: HOSPADM

## 2017-03-10 RX ORDER — ARIPIPRAZOLE 10 MG/1
10 TABLET ORAL
Status: DISCONTINUED | OUTPATIENT
Start: 2017-03-10 | End: 2017-03-10

## 2017-03-10 RX ORDER — LEVOTHYROXINE SODIUM 175 UG/1
175 TABLET ORAL DAILY
Status: ON HOLD | COMMUNITY
End: 2017-11-07

## 2017-03-10 RX ORDER — ARIPIPRAZOLE 5 MG/1
5 TABLET ORAL
Status: DISCONTINUED | OUTPATIENT
Start: 2017-03-10 | End: 2017-03-11 | Stop reason: HOSPADM

## 2017-03-10 RX ORDER — LORAZEPAM 2 MG/1
2 TABLET ORAL EVERY 4 HOURS PRN
COMMUNITY
End: 2017-07-13

## 2017-03-10 RX ORDER — LORAZEPAM 2 MG/ML
INJECTION INTRAMUSCULAR CODE/TRAUMA/SEDATION MEDICATION
Status: COMPLETED | OUTPATIENT
Start: 2017-03-10 | End: 2017-03-10

## 2017-03-10 RX ORDER — LORAZEPAM 2 MG/ML
INJECTION INTRAMUSCULAR
Status: DISPENSED
Start: 2017-03-10 | End: 2017-03-10

## 2017-03-10 RX ORDER — FENTANYL CITRATE 50 UG/ML
INJECTION, SOLUTION INTRAMUSCULAR; INTRAVENOUS CODE/TRAUMA/SEDATION MEDICATION
Status: COMPLETED | OUTPATIENT
Start: 2017-03-10 | End: 2017-03-10

## 2017-03-10 RX ORDER — LORAZEPAM 1 MG/1
1 TABLET ORAL EVERY 4 HOURS PRN
Status: DISCONTINUED | OUTPATIENT
Start: 2017-03-10 | End: 2017-03-11 | Stop reason: HOSPADM

## 2017-03-10 RX ORDER — ARIPIPRAZOLE 10 MG/1
10 TABLET ORAL
COMMUNITY
End: 2017-03-11 | Stop reason: HOSPADM

## 2017-03-10 RX ORDER — IBUPROFEN 600 MG/1
600 TABLET ORAL EVERY 6 HOURS PRN
Status: DISCONTINUED | OUTPATIENT
Start: 2017-03-10 | End: 2017-03-11 | Stop reason: HOSPADM

## 2017-03-10 RX ADMIN — ARIPIPRAZOLE 5 MG: 5 TABLET ORAL at 22:49

## 2017-03-10 RX ADMIN — ACETAMINOPHEN 975 MG: 325 TABLET, FILM COATED ORAL at 11:22

## 2017-03-10 RX ADMIN — FENTANYL CITRATE 50 MCG: 50 INJECTION, SOLUTION INTRAMUSCULAR; INTRAVENOUS at 10:10

## 2017-03-10 RX ADMIN — ENOXAPARIN SODIUM 40 MG: 40 INJECTION SUBCUTANEOUS at 12:37

## 2017-03-10 RX ADMIN — IBUPROFEN 600 MG: 600 TABLET ORAL at 13:11

## 2017-03-10 RX ADMIN — LORAZEPAM 0.5 MG: 2 INJECTION INTRAMUSCULAR; INTRAVENOUS at 10:35

## 2017-03-10 RX ADMIN — ENOXAPARIN SODIUM 40 MG: 40 INJECTION SUBCUTANEOUS at 22:48

## 2017-03-10 RX ADMIN — TETANUS TOXOID, REDUCED DIPHTHERIA TOXOID AND ACELLULAR PERTUSSIS VACCINE, ADSORBED 0.5 ML: 5; 2.5; 8; 8; 2.5 SUSPENSION INTRAMUSCULAR at 10:12

## 2017-03-10 RX ADMIN — FLUOXETINE 20 MG: 20 CAPSULE ORAL at 13:11

## 2017-03-10 RX ADMIN — LORAZEPAM 1 MG: 1 TABLET ORAL at 19:58

## 2017-03-11 VITALS
DIASTOLIC BLOOD PRESSURE: 86 MMHG | RESPIRATION RATE: 19 BRPM | OXYGEN SATURATION: 98 % | SYSTOLIC BLOOD PRESSURE: 121 MMHG | HEART RATE: 109 BPM | HEIGHT: 72 IN | BODY MASS INDEX: 32.51 KG/M2 | TEMPERATURE: 97.8 F | WEIGHT: 240 LBS

## 2017-03-11 PROCEDURE — G8989 SELF CARE D/C STATUS: HCPCS

## 2017-03-11 PROCEDURE — 97165 OT EVAL LOW COMPLEX 30 MIN: CPT

## 2017-03-11 PROCEDURE — G8987 SELF CARE CURRENT STATUS: HCPCS

## 2017-03-11 PROCEDURE — G8988 SELF CARE GOAL STATUS: HCPCS

## 2017-03-11 RX ORDER — FLUOXETINE HYDROCHLORIDE 20 MG/1
20 CAPSULE ORAL DAILY
Qty: 30 CAPSULE | Refills: 0 | Status: SHIPPED | OUTPATIENT
Start: 2017-03-11 | End: 2017-07-13

## 2017-03-11 RX ORDER — FLUTICASONE PROPIONATE 50 MCG
1 SPRAY, SUSPENSION (ML) NASAL DAILY
Status: DISCONTINUED | OUTPATIENT
Start: 2017-03-11 | End: 2017-03-11

## 2017-03-11 RX ORDER — DIAPER,BRIEF,INFANT-TODD,DISP
1 EACH MISCELLANEOUS 4 TIMES DAILY PRN
Qty: 30 G | Refills: 0 | Status: SHIPPED | OUTPATIENT
Start: 2017-03-11 | End: 2017-07-13

## 2017-03-11 RX ORDER — MECLIZINE HCL 12.5 MG/1
12.5 TABLET ORAL EVERY 8 HOURS PRN
Qty: 30 TABLET | Refills: 0 | Status: SHIPPED | OUTPATIENT
Start: 2017-03-11 | End: 2017-07-13

## 2017-03-11 RX ORDER — ARIPIPRAZOLE 5 MG/1
5 TABLET ORAL
Qty: 30 TABLET | Refills: 0 | Status: SHIPPED | OUTPATIENT
Start: 2017-03-11 | End: 2017-07-13

## 2017-03-11 RX ORDER — ACETAMINOPHEN 325 MG/1
975 TABLET ORAL EVERY 6 HOURS PRN
Qty: 30 TABLET | Refills: 0 | Status: SHIPPED | OUTPATIENT
Start: 2017-03-11 | End: 2017-04-10

## 2017-03-11 RX ORDER — ONDANSETRON 4 MG/1
4 TABLET, ORALLY DISINTEGRATING ORAL EVERY 6 HOURS PRN
Qty: 20 TABLET | Refills: 0 | Status: SHIPPED | OUTPATIENT
Start: 2017-03-11 | End: 2017-10-31

## 2017-03-11 RX ORDER — OXYMETAZOLINE HYDROCHLORIDE 0.05 G/100ML
2 SPRAY NASAL EVERY 12 HOURS SCHEDULED
Status: DISCONTINUED | OUTPATIENT
Start: 2017-03-11 | End: 2017-03-11 | Stop reason: HOSPADM

## 2017-03-11 RX ORDER — ONDANSETRON 4 MG/1
4 TABLET, ORALLY DISINTEGRATING ORAL EVERY 6 HOURS PRN
Status: DISCONTINUED | OUTPATIENT
Start: 2017-03-11 | End: 2017-03-11 | Stop reason: HOSPADM

## 2017-03-11 RX ORDER — DIAPER,BRIEF,INFANT-TODD,DISP
EACH MISCELLANEOUS 4 TIMES DAILY PRN
Status: DISCONTINUED | OUTPATIENT
Start: 2017-03-11 | End: 2017-03-11 | Stop reason: HOSPADM

## 2017-03-11 RX ADMIN — LEVOTHYROXINE SODIUM 175 MCG: 100 TABLET ORAL at 05:44

## 2017-03-11 RX ADMIN — OXYMETAZOLINE HYDROCHLORIDE 2 SPRAY: 5 SPRAY NASAL at 09:21

## 2017-03-11 RX ADMIN — ONDANSETRON 4 MG: 2 INJECTION INTRAMUSCULAR; INTRAVENOUS at 05:44

## 2017-03-11 RX ADMIN — FLUOXETINE 20 MG: 20 CAPSULE ORAL at 09:19

## 2017-03-11 RX ADMIN — ACETAMINOPHEN 975 MG: 325 TABLET, FILM COATED ORAL at 05:44

## 2017-03-11 RX ADMIN — ENOXAPARIN SODIUM 40 MG: 40 INJECTION SUBCUTANEOUS at 09:19

## 2017-03-11 RX ADMIN — ACETAMINOPHEN 975 MG: 325 TABLET, FILM COATED ORAL at 11:55

## 2017-03-11 RX ADMIN — IBUPROFEN 600 MG: 600 TABLET ORAL at 09:19

## 2017-03-17 ENCOUNTER — APPOINTMENT (OUTPATIENT)
Dept: LAB | Facility: MEDICAL CENTER | Age: 37
End: 2017-03-17
Payer: COMMERCIAL

## 2017-03-17 ENCOUNTER — ALLSCRIPTS OFFICE VISIT (OUTPATIENT)
Dept: OTHER | Facility: OTHER | Age: 37
End: 2017-03-17

## 2017-03-17 DIAGNOSIS — E89.0 POSTPROCEDURAL HYPOTHYROIDISM: ICD-10-CM

## 2017-03-17 DIAGNOSIS — E07.89 OTHER SPECIFIED DISORDERS OF THYROID: ICD-10-CM

## 2017-03-17 LAB
T4 FREE SERPL-MCNC: 1.19 NG/DL (ref 0.76–1.46)
TSH SERPL DL<=0.05 MIU/L-ACNC: 0.5 UIU/ML (ref 0.36–3.74)

## 2017-03-17 PROCEDURE — 36415 COLL VENOUS BLD VENIPUNCTURE: CPT

## 2017-03-17 PROCEDURE — 84439 ASSAY OF FREE THYROXINE: CPT

## 2017-03-17 PROCEDURE — 84443 ASSAY THYROID STIM HORMONE: CPT

## 2017-03-21 ENCOUNTER — GENERIC CONVERSION - ENCOUNTER (OUTPATIENT)
Dept: OTHER | Facility: OTHER | Age: 37
End: 2017-03-21

## 2017-03-23 ENCOUNTER — ALLSCRIPTS OFFICE VISIT (OUTPATIENT)
Dept: OTHER | Facility: OTHER | Age: 37
End: 2017-03-23

## 2017-03-24 ENCOUNTER — ALLSCRIPTS OFFICE VISIT (OUTPATIENT)
Dept: OTHER | Facility: OTHER | Age: 37
End: 2017-03-24

## 2017-04-04 ENCOUNTER — ALLSCRIPTS OFFICE VISIT (OUTPATIENT)
Dept: OTHER | Facility: OTHER | Age: 37
End: 2017-04-04

## 2017-04-05 ENCOUNTER — ALLSCRIPTS OFFICE VISIT (OUTPATIENT)
Dept: OTHER | Facility: OTHER | Age: 37
End: 2017-04-05

## 2017-04-07 ENCOUNTER — TRANSCRIBE ORDERS (OUTPATIENT)
Dept: ADMINISTRATIVE | Facility: HOSPITAL | Age: 37
End: 2017-04-07

## 2017-04-07 ENCOUNTER — APPOINTMENT (OUTPATIENT)
Dept: LAB | Facility: MEDICAL CENTER | Age: 37
End: 2017-04-07
Payer: COMMERCIAL

## 2017-04-07 DIAGNOSIS — C73 MALIGNANT NEOPLASM OF THYROID GLAND (HCC): ICD-10-CM

## 2017-04-07 DIAGNOSIS — E89.0 POSTPROCEDURAL HYPOTHYROIDISM: ICD-10-CM

## 2017-04-07 DIAGNOSIS — E55.9 VITAMIN D DEFICIENCY: ICD-10-CM

## 2017-04-07 DIAGNOSIS — R73.01 IMPAIRED FASTING GLUCOSE: ICD-10-CM

## 2017-04-07 LAB
ALBUMIN SERPL BCP-MCNC: 3.9 G/DL (ref 3.5–5)
ALP SERPL-CCNC: 93 U/L (ref 46–116)
ALT SERPL W P-5'-P-CCNC: 50 U/L (ref 12–78)
ANION GAP SERPL CALCULATED.3IONS-SCNC: 7 MMOL/L (ref 4–13)
AST SERPL W P-5'-P-CCNC: 19 U/L (ref 5–45)
BILIRUB DIRECT SERPL-MCNC: 0.11 MG/DL (ref 0–0.2)
BILIRUB SERPL-MCNC: 0.5 MG/DL (ref 0.2–1)
BUN SERPL-MCNC: 22 MG/DL (ref 5–25)
CALCIUM SERPL-MCNC: 8.8 MG/DL (ref 8.3–10.1)
CHLORIDE SERPL-SCNC: 103 MMOL/L (ref 100–108)
CHOLEST SERPL-MCNC: 249 MG/DL (ref 50–200)
CO2 SERPL-SCNC: 28 MMOL/L (ref 21–32)
CREAT SERPL-MCNC: 0.89 MG/DL (ref 0.6–1.3)
EST. AVERAGE GLUCOSE BLD GHB EST-MCNC: 105 MG/DL
GFR SERPL CREATININE-BSD FRML MDRD: >60 ML/MIN/1.73SQ M
HBA1C MFR BLD: 5.3 % (ref 4.2–6.3)
HDLC SERPL-MCNC: 44 MG/DL (ref 40–60)
LDLC SERPL CALC-MCNC: 165 MG/DL (ref 0–100)
PHOSPHATE SERPL-MCNC: 3 MG/DL (ref 2.7–4.5)
POTASSIUM SERPL-SCNC: 4.3 MMOL/L (ref 3.5–5.3)
PROT SERPL-MCNC: 7 G/DL (ref 6.4–8.2)
SODIUM SERPL-SCNC: 138 MMOL/L (ref 136–145)
TRIGL SERPL-MCNC: 200 MG/DL

## 2017-04-07 PROCEDURE — 84100 ASSAY OF PHOSPHORUS: CPT

## 2017-04-07 PROCEDURE — 80061 LIPID PANEL: CPT

## 2017-04-07 PROCEDURE — 80076 HEPATIC FUNCTION PANEL: CPT

## 2017-04-07 PROCEDURE — 83036 HEMOGLOBIN GLYCOSYLATED A1C: CPT

## 2017-04-07 PROCEDURE — 36415 COLL VENOUS BLD VENIPUNCTURE: CPT

## 2017-04-07 PROCEDURE — 80048 BASIC METABOLIC PNL TOTAL CA: CPT

## 2017-04-10 ENCOUNTER — GENERIC CONVERSION - ENCOUNTER (OUTPATIENT)
Dept: OTHER | Facility: OTHER | Age: 37
End: 2017-04-10

## 2017-04-10 ENCOUNTER — TRANSCRIBE ORDERS (OUTPATIENT)
Dept: ADMINISTRATIVE | Facility: HOSPITAL | Age: 37
End: 2017-04-10

## 2017-04-10 DIAGNOSIS — R53.83 OTHER FATIGUE: ICD-10-CM

## 2017-04-10 DIAGNOSIS — E29.1 TESTICULAR HYPOFUNCTION: ICD-10-CM

## 2017-04-10 DIAGNOSIS — E29.1 DEFICIENCY OF TESTOSTERONE BIOSYNTHESIS: ICD-10-CM
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
## 2017-04-10 DIAGNOSIS — T73.2XXA FATIGUE DUE TO EXPOSURE, INITIAL ENCOUNTER: Primary | ICD-10-CM

## 2017-04-15 ENCOUNTER — APPOINTMENT (OUTPATIENT)
Dept: LAB | Facility: MEDICAL CENTER | Age: 37
End: 2017-04-15
Payer: COMMERCIAL
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
## 2017-04-15 DIAGNOSIS — E29.1 TESTICULAR HYPOFUNCTION: ICD-10-CM

## 2017-04-15 DIAGNOSIS — R53.83 OTHER FATIGUE: ICD-10-CM

## 2017-04-15 LAB
CORTIS AM PEAK SERPL-MCNC: 8.6 UG/ML (ref 4.2–22.4)
FSH SERPL-ACNC: 10.3 MIU/ML (ref 0.7–10.8)
LH SERPL-ACNC: 4.2 MIU/ML (ref 1.2–10.6)
PROLACTIN SERPL-MCNC: 5 NG/ML (ref 2.5–17.4)

## 2017-04-15 PROCEDURE — 84146 ASSAY OF PROLACTIN: CPT

## 2017-04-15 PROCEDURE — 83001 ASSAY OF GONADOTROPIN (FSH): CPT

## 2017-04-15 PROCEDURE — 82533 TOTAL CORTISOL: CPT

## 2017-04-15 PROCEDURE — 84270 ASSAY OF SEX HORMONE GLOBUL: CPT

## 2017-04-15 PROCEDURE — 84402 ASSAY OF FREE TESTOSTERONE: CPT

## 2017-04-15 PROCEDURE — 36415 COLL VENOUS BLD VENIPUNCTURE: CPT

## 2017-04-15 PROCEDURE — 84403 ASSAY OF TOTAL TESTOSTERONE: CPT

## 2017-04-15 PROCEDURE — 82024 ASSAY OF ACTH: CPT

## 2017-04-15 PROCEDURE — 84305 ASSAY OF SOMATOMEDIN: CPT

## 2017-04-15 PROCEDURE — 83002 ASSAY OF GONADOTROPIN (LH): CPT

## 2017-04-17 LAB
SHBG SERPL-SCNC: 27.3 NMOL/L (ref 16.5–55.9)
TESTOST FREE SERPL-MCNC: 7.7 PG/ML (ref 8.7–25.1)
TESTOST SERPL-MCNC: 271 NG/DL (ref 348–1197)
TESTOSTERONE COMMENT: ABNORMAL

## 2017-04-18 LAB
ACTH PLAS-MCNC: 17.4 PG/ML (ref 7.2–63.3)
IGF-I SERPL-MCNC: 123 NG/ML (ref 83–233)

## 2017-04-27 ENCOUNTER — GENERIC CONVERSION - ENCOUNTER (OUTPATIENT)
Dept: OTHER | Facility: OTHER | Age: 37
End: 2017-04-27

## 2017-05-13 ENCOUNTER — APPOINTMENT (OUTPATIENT)
Dept: LAB | Facility: MEDICAL CENTER | Age: 37
End: 2017-05-13
Payer: COMMERCIAL

## 2017-05-13 DIAGNOSIS — E89.0 POSTPROCEDURAL HYPOTHYROIDISM: ICD-10-CM

## 2017-05-13 DIAGNOSIS — C73 MALIGNANT NEOPLASM OF THYROID GLAND (HCC): ICD-10-CM

## 2017-05-13 DIAGNOSIS — E55.9 VITAMIN D DEFICIENCY: ICD-10-CM

## 2017-05-13 DIAGNOSIS — R73.01 IMPAIRED FASTING GLUCOSE: ICD-10-CM

## 2017-05-13 LAB
T4 FREE SERPL-MCNC: 1.01 NG/DL (ref 0.76–1.46)
TSH SERPL DL<=0.05 MIU/L-ACNC: 0.07 UIU/ML (ref 0.36–3.74)

## 2017-05-13 PROCEDURE — 86800 THYROGLOBULIN ANTIBODY: CPT

## 2017-05-13 PROCEDURE — 84432 ASSAY OF THYROGLOBULIN: CPT

## 2017-05-13 PROCEDURE — 84443 ASSAY THYROID STIM HORMONE: CPT

## 2017-05-13 PROCEDURE — 36415 COLL VENOUS BLD VENIPUNCTURE: CPT

## 2017-05-13 PROCEDURE — 84439 ASSAY OF FREE THYROXINE: CPT

## 2017-05-16 ENCOUNTER — GENERIC CONVERSION - ENCOUNTER (OUTPATIENT)
Dept: OTHER | Facility: OTHER | Age: 37
End: 2017-05-16

## 2017-05-16 DIAGNOSIS — E27.40 ADRENOCORTICAL INSUFFICIENCY (HCC): ICD-10-CM

## 2017-05-16 DIAGNOSIS — C73 MALIGNANT NEOPLASM OF THYROID GLAND (HCC): ICD-10-CM

## 2017-05-16 DIAGNOSIS — R51 HEADACHE(784.0): ICD-10-CM

## 2017-05-16 DIAGNOSIS — E55.9 VITAMIN D DEFICIENCY: ICD-10-CM

## 2017-05-16 DIAGNOSIS — R73.01 IMPAIRED FASTING GLUCOSE: ICD-10-CM

## 2017-05-16 DIAGNOSIS — E89.0 POSTPROCEDURAL HYPOTHYROIDISM: ICD-10-CM

## 2017-05-16 DIAGNOSIS — I86.1 SCROTAL VARICES: ICD-10-CM

## 2017-05-16 DIAGNOSIS — R53.83 OTHER FATIGUE: ICD-10-CM

## 2017-05-16 DIAGNOSIS — E29.1 TESTICULAR HYPOFUNCTION: ICD-10-CM
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
## 2017-05-16 LAB
THYROGLOB AB SERPL-ACNC: <1 IU/ML (ref 0–0.9)
THYROGLOB SERPL-MCNC: 0.5 NG/ML (ref 1.4–29.2)

## 2017-05-24 ENCOUNTER — HOSPITAL ENCOUNTER (OUTPATIENT)
Dept: RADIOLOGY | Facility: MEDICAL CENTER | Age: 37
Discharge: HOME | End: 2017-05-24
Payer: COMMERCIAL

## 2017-05-24 DIAGNOSIS — E29.1 TESTICULAR HYPOFUNCTION: ICD-10-CM

## 2017-05-24 PROCEDURE — 76870 US EXAM SCROTUM: CPT

## 2017-06-02 ENCOUNTER — GENERIC CONVERSION - ENCOUNTER (OUTPATIENT)
Dept: OTHER | Facility: OTHER | Age: 37
End: 2017-06-02

## 2017-06-03 ENCOUNTER — TRANSCRIBE ORDERS (OUTPATIENT)
Dept: LAB | Facility: CLINIC | Age: 37
End: 2017-06-03

## 2017-06-03 ENCOUNTER — APPOINTMENT (OUTPATIENT)
Dept: LAB | Facility: CLINIC | Age: 37
End: 2017-06-03
Payer: COMMERCIAL

## 2017-06-03 ENCOUNTER — TRANSCRIBE ORDERS (OUTPATIENT)
Dept: ADMINISTRATIVE | Facility: HOSPITAL | Age: 37
End: 2017-06-03

## 2017-06-03 ENCOUNTER — APPOINTMENT (OUTPATIENT)
Dept: LAB | Facility: MEDICAL CENTER | Age: 37
End: 2017-06-03
Payer: COMMERCIAL

## 2017-06-03 DIAGNOSIS — R51.9 FACIAL PAIN: ICD-10-CM

## 2017-06-03 DIAGNOSIS — E27.40 ADRENAL CORTICAL HYPOFUNCTION (HCC): ICD-10-CM

## 2017-06-03 DIAGNOSIS — E27.40 ADRENOCORTICAL INSUFFICIENCY (HCC): ICD-10-CM

## 2017-06-03 DIAGNOSIS — E29.1 3-OXO-5 ALPHA-STEROID DELTA 4-DEHYDROGENASE DEFICIENCY: ICD-10-CM

## 2017-06-03 DIAGNOSIS — IMO0001 VICTIM OF VEHICULAR OR TRAFFIC ACCIDENT, INITIAL ENCOUNTER: ICD-10-CM

## 2017-06-03 DIAGNOSIS — R53.83 FATIGUE, UNSPECIFIED TYPE: ICD-10-CM

## 2017-06-03 DIAGNOSIS — R53.83 FATIGUE, UNSPECIFIED TYPE: Primary | ICD-10-CM
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
## 2017-06-03 DIAGNOSIS — R53.83 OTHER FATIGUE: ICD-10-CM

## 2017-06-03 DIAGNOSIS — R51 HEADACHE(784.0): ICD-10-CM

## 2017-06-03 DIAGNOSIS — E29.1 TESTICULAR HYPOFUNCTION: ICD-10-CM

## 2017-06-03 LAB
CORTIS AM PEAK SERPL-MCNC: 7.3 UG/ML (ref 4.2–22.4)
ESTRADIOL SERPL-MCNC: 21 PG/ML (ref 11–52.5)
FSH SERPL-ACNC: 11 MIU/ML (ref 0.7–10.8)
LH SERPL-ACNC: 3.7 MIU/ML (ref 1.2–10.6)
PSA SERPL-MCNC: 0.4 NG/ML (ref 0–4)
TESTOST SERPL-MCNC: 290.7 NG/DL (ref 241–827)

## 2017-06-03 PROCEDURE — 84153 ASSAY OF PSA TOTAL: CPT

## 2017-06-03 PROCEDURE — 82533 TOTAL CORTISOL: CPT

## 2017-06-03 PROCEDURE — 82627 DEHYDROEPIANDROSTERONE: CPT

## 2017-06-03 PROCEDURE — 83002 ASSAY OF GONADOTROPIN (LH): CPT

## 2017-06-03 PROCEDURE — 82024 ASSAY OF ACTH: CPT

## 2017-06-03 PROCEDURE — 83001 ASSAY OF GONADOTROPIN (FSH): CPT

## 2017-06-03 PROCEDURE — 84270 ASSAY OF SEX HORMONE GLOBUL: CPT

## 2017-06-03 PROCEDURE — 82670 ASSAY OF TOTAL ESTRADIOL: CPT

## 2017-06-03 PROCEDURE — 84403 ASSAY OF TOTAL TESTOSTERONE: CPT

## 2017-06-04 LAB
DHEA-S SERPL-MCNC: 273.9 UG/DL (ref 102.6–416.3)
SHBG SERPL-SCNC: 28.7 NMOL/L (ref 16.5–55.9)

## 2017-06-05 ENCOUNTER — ALLSCRIPTS OFFICE VISIT (OUTPATIENT)
Dept: OTHER | Facility: OTHER | Age: 37
End: 2017-06-05

## 2017-06-06 LAB — ACTH PLAS-MCNC: 17.4 PG/ML (ref 7.2–63.3)

## 2017-06-07 ENCOUNTER — HOSPITAL ENCOUNTER (OUTPATIENT)
Dept: RADIOLOGY | Facility: MEDICAL CENTER | Age: 37
Discharge: HOME | End: 2017-06-07
Payer: COMMERCIAL

## 2017-06-07 DIAGNOSIS — I86.1 SCROTAL VARICES: ICD-10-CM

## 2017-06-07 PROCEDURE — 74178 CT ABD&PLV WO CNTR FLWD CNTR: CPT

## 2017-06-07 RX ADMIN — IOHEXOL 100 ML: 350 INJECTION, SOLUTION INTRAVENOUS at 16:28

## 2017-06-14 ENCOUNTER — GENERIC CONVERSION - ENCOUNTER (OUTPATIENT)
Dept: OTHER | Facility: OTHER | Age: 37
End: 2017-06-14

## 2017-07-03 ENCOUNTER — ALLSCRIPTS OFFICE VISIT (OUTPATIENT)
Dept: OTHER | Facility: OTHER | Age: 37
End: 2017-07-03

## 2017-07-05 DIAGNOSIS — E78.5 HYPERLIPIDEMIA: ICD-10-CM

## 2017-07-13 ENCOUNTER — HOSPITAL ENCOUNTER (EMERGENCY)
Facility: HOSPITAL | Age: 37
Discharge: HOME | End: 2017-07-13
Attending: EMERGENCY MEDICINE | Admitting: EMERGENCY MEDICINE
Payer: COMMERCIAL

## 2017-07-13 ENCOUNTER — APPOINTMENT (EMERGENCY)
Dept: CT IMAGING | Facility: HOSPITAL | Age: 37
End: 2017-07-13
Payer: COMMERCIAL

## 2017-07-13 VITALS
WEIGHT: 261.47 LBS | RESPIRATION RATE: 18 BRPM | OXYGEN SATURATION: 96 % | BODY MASS INDEX: 35.41 KG/M2 | HEIGHT: 72 IN | TEMPERATURE: 97.7 F | HEART RATE: 76 BPM | SYSTOLIC BLOOD PRESSURE: 126 MMHG | DIASTOLIC BLOOD PRESSURE: 73 MMHG

## 2017-07-13 DIAGNOSIS — R10.32 LLQ ABDOMINAL PAIN: ICD-10-CM

## 2017-07-13 DIAGNOSIS — R10.12 LUQ ABDOMINAL PAIN: Primary | ICD-10-CM

## 2017-07-13 LAB
ALBUMIN SERPL BCP-MCNC: 4.1 G/DL (ref 3.5–5)
ALP SERPL-CCNC: 79 U/L (ref 46–116)
ALT SERPL W P-5'-P-CCNC: 55 U/L (ref 12–78)
ANION GAP SERPL CALCULATED.3IONS-SCNC: 7 MMOL/L (ref 4–13)
AST SERPL W P-5'-P-CCNC: 15 U/L (ref 5–45)
BASOPHILS # BLD AUTO: 0.02 THOUSANDS/ΜL (ref 0–0.1)
BASOPHILS NFR BLD AUTO: 0 % (ref 0–1)
BILIRUB DIRECT SERPL-MCNC: 0.13 MG/DL (ref 0–0.2)
BILIRUB SERPL-MCNC: 0.6 MG/DL (ref 0.2–1)
BUN SERPL-MCNC: 21 MG/DL (ref 5–25)
CALCIUM SERPL-MCNC: 9.5 MG/DL (ref 8.3–10.1)
CHLORIDE SERPL-SCNC: 107 MMOL/L (ref 100–108)
CO2 SERPL-SCNC: 30 MMOL/L (ref 21–32)
CREAT SERPL-MCNC: 0.86 MG/DL (ref 0.6–1.3)
EOSINOPHIL # BLD AUTO: 0.22 THOUSAND/ΜL (ref 0–0.61)
EOSINOPHIL NFR BLD AUTO: 3 % (ref 0–6)
ERYTHROCYTE [DISTWIDTH] IN BLOOD BY AUTOMATED COUNT: 13 % (ref 11.6–15.1)
GFR SERPL CREATININE-BSD FRML MDRD: >60 ML/MIN/1.73SQ M
GLUCOSE SERPL-MCNC: 110 MG/DL (ref 65–140)
HCT VFR BLD AUTO: 45.7 % (ref 36.5–49.3)
HGB BLD-MCNC: 15.3 G/DL (ref 12–17)
LIPASE SERPL-CCNC: 127 U/L (ref 73–393)
LYMPHOCYTES # BLD AUTO: 1.44 THOUSANDS/ΜL (ref 0.6–4.47)
LYMPHOCYTES NFR BLD AUTO: 19 % (ref 14–44)
MCH RBC QN AUTO: 28.4 PG (ref 26.8–34.3)
MCHC RBC AUTO-ENTMCNC: 33.5 G/DL (ref 31.4–37.4)
MCV RBC AUTO: 85 FL (ref 82–98)
MONOCYTES # BLD AUTO: 0.43 THOUSAND/ΜL (ref 0.17–1.22)
MONOCYTES NFR BLD AUTO: 6 % (ref 4–12)
NEUTROPHILS # BLD AUTO: 5.3 THOUSANDS/ΜL (ref 1.85–7.62)
NEUTS SEG NFR BLD AUTO: 72 % (ref 43–75)
PLATELET # BLD AUTO: 242 THOUSANDS/UL (ref 149–390)
PMV BLD AUTO: 9.2 FL (ref 8.9–12.7)
POTASSIUM SERPL-SCNC: 4.1 MMOL/L (ref 3.5–5.3)
PROT SERPL-MCNC: 7.9 G/DL (ref 6.4–8.2)
RBC # BLD AUTO: 5.39 MILLION/UL (ref 3.88–5.62)
SODIUM SERPL-SCNC: 144 MMOL/L (ref 136–145)
WBC # BLD AUTO: 7.41 THOUSAND/UL (ref 4.31–10.16)

## 2017-07-13 PROCEDURE — 96360 HYDRATION IV INFUSION INIT: CPT

## 2017-07-13 PROCEDURE — 85025 COMPLETE CBC W/AUTO DIFF WBC: CPT | Performed by: EMERGENCY MEDICINE

## 2017-07-13 PROCEDURE — 83690 ASSAY OF LIPASE: CPT | Performed by: EMERGENCY MEDICINE

## 2017-07-13 PROCEDURE — 36415 COLL VENOUS BLD VENIPUNCTURE: CPT | Performed by: EMERGENCY MEDICINE

## 2017-07-13 PROCEDURE — 87476 LYME DIS DNA AMP PROBE: CPT | Performed by: EMERGENCY MEDICINE

## 2017-07-13 PROCEDURE — 80076 HEPATIC FUNCTION PANEL: CPT | Performed by: EMERGENCY MEDICINE

## 2017-07-13 PROCEDURE — 80048 BASIC METABOLIC PNL TOTAL CA: CPT | Performed by: EMERGENCY MEDICINE

## 2017-07-13 PROCEDURE — 74177 CT ABD & PELVIS W/CONTRAST: CPT

## 2017-07-13 PROCEDURE — 99284 EMERGENCY DEPT VISIT MOD MDM: CPT

## 2017-07-13 RX ORDER — DICYCLOMINE HCL 20 MG
20 TABLET ORAL ONCE
Status: COMPLETED | OUTPATIENT
Start: 2017-07-13 | End: 2017-07-13

## 2017-07-13 RX ORDER — OMEPRAZOLE 20 MG/1
20 CAPSULE, DELAYED RELEASE ORAL DAILY
Qty: 14 CAPSULE | Refills: 0 | Status: SHIPPED | OUTPATIENT
Start: 2017-07-13 | End: 2017-10-18

## 2017-07-13 RX ORDER — DICYCLOMINE HCL 20 MG
20 TABLET ORAL 2 TIMES DAILY PRN
Qty: 15 TABLET | Refills: 0 | Status: SHIPPED | OUTPATIENT
Start: 2017-07-13 | End: 2017-10-17

## 2017-07-13 RX ADMIN — IOHEXOL 100 ML: 350 INJECTION, SOLUTION INTRAVENOUS at 08:53

## 2017-07-13 RX ADMIN — DICYCLOMINE HYDROCHLORIDE 20 MG: 20 TABLET ORAL at 08:18

## 2017-07-13 RX ADMIN — SODIUM CHLORIDE 1000 ML: 0.9 INJECTION, SOLUTION INTRAVENOUS at 08:20

## 2017-07-16 LAB — B BURGDOR DNA SPEC QL NAA+PROBE: NEGATIVE

## 2017-08-07 DIAGNOSIS — E89.0 POSTPROCEDURAL HYPOTHYROIDISM: ICD-10-CM

## 2017-08-07 DIAGNOSIS — C73 MALIGNANT NEOPLASM OF THYROID GLAND (HCC): ICD-10-CM

## 2017-08-07 DIAGNOSIS — E55.9 VITAMIN D DEFICIENCY: ICD-10-CM

## 2017-09-05 ENCOUNTER — TRANSCRIBE ORDERS (OUTPATIENT)
Dept: ADMINISTRATIVE | Facility: HOSPITAL | Age: 37
End: 2017-09-05

## 2017-09-05 ENCOUNTER — ALLSCRIPTS OFFICE VISIT (OUTPATIENT)
Dept: OTHER | Facility: OTHER | Age: 37
End: 2017-09-05

## 2017-09-05 DIAGNOSIS — C73 MALIGNANT NEOPLASM OF THYROID GLAND (HCC): Primary | ICD-10-CM

## 2017-09-07 ENCOUNTER — HOSPITAL ENCOUNTER (OUTPATIENT)
Dept: RADIOLOGY | Facility: MEDICAL CENTER | Age: 37
Discharge: HOME | End: 2017-09-07
Payer: COMMERCIAL

## 2017-09-07 DIAGNOSIS — E89.0 POSTPROCEDURAL HYPOTHYROIDISM: ICD-10-CM

## 2017-09-07 PROCEDURE — 76536 US EXAM OF HEAD AND NECK: CPT

## 2017-09-11 ENCOUNTER — GENERIC CONVERSION - ENCOUNTER (OUTPATIENT)
Dept: OTHER | Facility: OTHER | Age: 37
End: 2017-09-11

## 2017-09-18 DIAGNOSIS — F32.9 MAJOR DEPRESSIVE DISORDER, SINGLE EPISODE: ICD-10-CM

## 2017-09-18 DIAGNOSIS — C73 MALIGNANT NEOPLASM OF THYROID GLAND (HCC): ICD-10-CM

## 2017-09-18 DIAGNOSIS — E55.9 VITAMIN D DEFICIENCY: ICD-10-CM

## 2017-09-18 DIAGNOSIS — E27.40 ADRENOCORTICAL INSUFFICIENCY (HCC): ICD-10-CM

## 2017-09-18 DIAGNOSIS — R73.01 IMPAIRED FASTING GLUCOSE: ICD-10-CM

## 2017-09-18 DIAGNOSIS — E89.0 POSTPROCEDURAL HYPOTHYROIDISM: ICD-10-CM

## 2017-09-18 DIAGNOSIS — I86.1 SCROTAL VARICES: ICD-10-CM

## 2017-09-18 DIAGNOSIS — E29.1 TESTICULAR HYPOFUNCTION: ICD-10-CM

## 2017-10-07 ENCOUNTER — APPOINTMENT (OUTPATIENT)
Dept: LAB | Facility: MEDICAL CENTER | Age: 37
End: 2017-10-07
Payer: COMMERCIAL

## 2017-10-07 DIAGNOSIS — I86.1 SCROTAL VARICES: ICD-10-CM

## 2017-10-07 DIAGNOSIS — C73 MALIGNANT NEOPLASM OF THYROID GLAND (HCC): ICD-10-CM

## 2017-10-07 DIAGNOSIS — E29.1 TESTICULAR HYPOFUNCTION: ICD-10-CM

## 2017-10-07 DIAGNOSIS — R73.01 IMPAIRED FASTING GLUCOSE: ICD-10-CM

## 2017-10-07 DIAGNOSIS — F32.9 MAJOR DEPRESSIVE DISORDER, SINGLE EPISODE: ICD-10-CM

## 2017-10-07 DIAGNOSIS — E89.0 POSTPROCEDURAL HYPOTHYROIDISM: ICD-10-CM

## 2017-10-07 DIAGNOSIS — E55.9 VITAMIN D DEFICIENCY: ICD-10-CM

## 2017-10-07 DIAGNOSIS — E27.40 ADRENOCORTICAL INSUFFICIENCY (HCC): ICD-10-CM

## 2017-10-07 LAB
25(OH)D3 SERPL-MCNC: 28.1 NG/ML (ref 30–100)
ALBUMIN SERPL BCP-MCNC: 3.8 G/DL (ref 3.5–5)
ANION GAP SERPL CALCULATED.3IONS-SCNC: 6 MMOL/L (ref 4–13)
BUN SERPL-MCNC: 13 MG/DL (ref 5–25)
CALCIUM SERPL-MCNC: 7.9 MG/DL (ref 8.3–10.1)
CHLORIDE SERPL-SCNC: 108 MMOL/L (ref 100–108)
CHOLEST SERPL-MCNC: 222 MG/DL (ref 50–200)
CO2 SERPL-SCNC: 25 MMOL/L (ref 21–32)
CREAT SERPL-MCNC: 0.82 MG/DL (ref 0.6–1.3)
EST. AVERAGE GLUCOSE BLD GHB EST-MCNC: 117 MG/DL
GFR SERPL CREATININE-BSD FRML MDRD: 111 ML/MIN/1.73SQ M
GLUCOSE P FAST SERPL-MCNC: 104 MG/DL (ref 65–99)
HBA1C MFR BLD: 5.7 % (ref 4.2–6.3)
HDLC SERPL-MCNC: 36 MG/DL (ref 40–60)
LDLC SERPL CALC-MCNC: 142 MG/DL (ref 0–100)
PHOSPHATE SERPL-MCNC: 2.3 MG/DL (ref 2.7–4.5)
POTASSIUM SERPL-SCNC: 4 MMOL/L (ref 3.5–5.3)
PTH-INTACT SERPL-MCNC: 37.2 PG/ML (ref 14–72)
SODIUM SERPL-SCNC: 139 MMOL/L (ref 136–145)
T4 FREE SERPL-MCNC: 1.04 NG/DL (ref 0.76–1.46)
TRIGL SERPL-MCNC: 222 MG/DL
TSH SERPL DL<=0.05 MIU/L-ACNC: 2.28 UIU/ML (ref 0.36–3.74)

## 2017-10-07 PROCEDURE — 83036 HEMOGLOBIN GLYCOSYLATED A1C: CPT

## 2017-10-07 PROCEDURE — 80069 RENAL FUNCTION PANEL: CPT

## 2017-10-07 PROCEDURE — 84439 ASSAY OF FREE THYROXINE: CPT

## 2017-10-07 PROCEDURE — 86800 THYROGLOBULIN ANTIBODY: CPT

## 2017-10-07 PROCEDURE — 82306 VITAMIN D 25 HYDROXY: CPT

## 2017-10-07 PROCEDURE — 83970 ASSAY OF PARATHORMONE: CPT

## 2017-10-07 PROCEDURE — 36415 COLL VENOUS BLD VENIPUNCTURE: CPT

## 2017-10-07 PROCEDURE — 80061 LIPID PANEL: CPT

## 2017-10-07 PROCEDURE — 84443 ASSAY THYROID STIM HORMONE: CPT

## 2017-10-07 PROCEDURE — 84432 ASSAY OF THYROGLOBULIN: CPT

## 2017-10-10 LAB
THYROGLOB AB SERPL-ACNC: <1 IU/ML (ref 0–0.9)
THYROGLOB SERPL-MCNC: 0.5 NG/ML (ref 1.4–29.2)

## 2017-10-11 ENCOUNTER — GENERIC CONVERSION - ENCOUNTER (OUTPATIENT)
Dept: OTHER | Facility: OTHER | Age: 37
End: 2017-10-11

## 2017-10-17 ENCOUNTER — APPOINTMENT (EMERGENCY)
Dept: CT IMAGING | Facility: HOSPITAL | Age: 37
End: 2017-10-17
Payer: COMMERCIAL

## 2017-10-17 ENCOUNTER — HOSPITAL ENCOUNTER (EMERGENCY)
Facility: HOSPITAL | Age: 37
Discharge: HOME | End: 2017-10-17
Attending: EMERGENCY MEDICINE | Admitting: EMERGENCY MEDICINE
Payer: COMMERCIAL

## 2017-10-17 VITALS
WEIGHT: 257.94 LBS | OXYGEN SATURATION: 94 % | HEIGHT: 72 IN | DIASTOLIC BLOOD PRESSURE: 83 MMHG | SYSTOLIC BLOOD PRESSURE: 121 MMHG | HEART RATE: 70 BPM | BODY MASS INDEX: 34.94 KG/M2 | RESPIRATION RATE: 18 BRPM | TEMPERATURE: 97.9 F

## 2017-10-17 DIAGNOSIS — F18.10 INHALANT ABUSE (HCC): ICD-10-CM

## 2017-10-17 DIAGNOSIS — T14.90XA SOFT TISSUE INJURY: Primary | ICD-10-CM

## 2017-10-17 LAB
ALBUMIN SERPL BCP-MCNC: 3.9 G/DL (ref 3.5–5)
ALP SERPL-CCNC: 119 U/L (ref 46–116)
ALT SERPL W P-5'-P-CCNC: 64 U/L (ref 12–78)
ANION GAP SERPL CALCULATED.3IONS-SCNC: 11 MMOL/L (ref 4–13)
AST SERPL W P-5'-P-CCNC: 26 U/L (ref 5–45)
ATRIAL RATE: 80 BPM
BASOPHILS # BLD AUTO: 0.01 THOUSANDS/ΜL (ref 0–0.1)
BASOPHILS NFR BLD AUTO: 0 % (ref 0–1)
BILIRUB SERPL-MCNC: 0.4 MG/DL (ref 0.2–1)
BILIRUB UR QL STRIP: NEGATIVE
BUN SERPL-MCNC: 10 MG/DL (ref 5–25)
CALCIUM SERPL-MCNC: 8.8 MG/DL (ref 8.3–10.1)
CHLORIDE SERPL-SCNC: 105 MMOL/L (ref 100–108)
CK SERPL-CCNC: 113 U/L (ref 39–308)
CLARITY UR: CLEAR
CO2 SERPL-SCNC: 25 MMOL/L (ref 21–32)
COLOR UR: NORMAL
CREAT SERPL-MCNC: 0.96 MG/DL (ref 0.6–1.3)
EOSINOPHIL # BLD AUTO: 0.16 THOUSAND/ΜL (ref 0–0.61)
EOSINOPHIL NFR BLD AUTO: 3 % (ref 0–6)
ERYTHROCYTE [DISTWIDTH] IN BLOOD BY AUTOMATED COUNT: 13.2 % (ref 11.6–15.1)
GFR SERPL CREATININE-BSD FRML MDRD: 99 ML/MIN/1.73SQ M
GLUCOSE SERPL-MCNC: 156 MG/DL (ref 65–140)
GLUCOSE UR STRIP-MCNC: NEGATIVE MG/DL
HCT VFR BLD AUTO: 43.5 % (ref 36.5–49.3)
HGB BLD-MCNC: 14.5 G/DL (ref 12–17)
HGB UR QL STRIP.AUTO: NEGATIVE
KETONES UR STRIP-MCNC: NEGATIVE MG/DL
LEUKOCYTE ESTERASE UR QL STRIP: NEGATIVE
LIPASE SERPL-CCNC: 111 U/L (ref 73–393)
LYMPHOCYTES # BLD AUTO: 1.2 THOUSANDS/ΜL (ref 0.6–4.47)
LYMPHOCYTES NFR BLD AUTO: 21 % (ref 14–44)
MCH RBC QN AUTO: 28.9 PG (ref 26.8–34.3)
MCHC RBC AUTO-ENTMCNC: 33.3 G/DL (ref 31.4–37.4)
MCV RBC AUTO: 87 FL (ref 82–98)
MONOCYTES # BLD AUTO: 0.26 THOUSAND/ΜL (ref 0.17–1.22)
MONOCYTES NFR BLD AUTO: 5 % (ref 4–12)
NEUTROPHILS # BLD AUTO: 4.14 THOUSANDS/ΜL (ref 1.85–7.62)
NEUTS SEG NFR BLD AUTO: 71 % (ref 43–75)
NITRITE UR QL STRIP: NEGATIVE
P AXIS: 45 DEGREES
PH UR STRIP.AUTO: 6 [PH] (ref 4.5–8)
PLATELET # BLD AUTO: 252 THOUSANDS/UL (ref 149–390)
PMV BLD AUTO: 9.2 FL (ref 8.9–12.7)
POTASSIUM SERPL-SCNC: 3.7 MMOL/L (ref 3.5–5.3)
PR INTERVAL: 152 MS
PROT SERPL-MCNC: 7.5 G/DL (ref 6.4–8.2)
PROT UR STRIP-MCNC: NEGATIVE MG/DL
QRS AXIS: 5 DEGREES
QRSD INTERVAL: 88 MS
QT INTERVAL: 344 MS
QTC INTERVAL: 396 MS
RBC # BLD AUTO: 5.01 MILLION/UL (ref 3.88–5.62)
SODIUM SERPL-SCNC: 141 MMOL/L (ref 136–145)
SP GR UR STRIP.AUTO: <=1.005 (ref 1–1.03)
T WAVE AXIS: 42 DEGREES
TROPONIN I SERPL-MCNC: <0.02 NG/ML
UROBILINOGEN UR QL STRIP.AUTO: 0.2 E.U./DL
VENTRICULAR RATE: 80 BPM
WBC # BLD AUTO: 5.77 THOUSAND/UL (ref 4.31–10.16)

## 2017-10-17 PROCEDURE — 82550 ASSAY OF CK (CPK): CPT | Performed by: EMERGENCY MEDICINE

## 2017-10-17 PROCEDURE — 80053 COMPREHEN METABOLIC PANEL: CPT | Performed by: EMERGENCY MEDICINE

## 2017-10-17 PROCEDURE — 81003 URINALYSIS AUTO W/O SCOPE: CPT | Performed by: EMERGENCY MEDICINE

## 2017-10-17 PROCEDURE — 93005 ELECTROCARDIOGRAM TRACING: CPT | Performed by: EMERGENCY MEDICINE

## 2017-10-17 PROCEDURE — 36415 COLL VENOUS BLD VENIPUNCTURE: CPT | Performed by: EMERGENCY MEDICINE

## 2017-10-17 PROCEDURE — 85025 COMPLETE CBC W/AUTO DIFF WBC: CPT | Performed by: EMERGENCY MEDICINE

## 2017-10-17 PROCEDURE — 74174 CTA ABD&PLVS W/CONTRAST: CPT

## 2017-10-17 PROCEDURE — 99284 EMERGENCY DEPT VISIT MOD MDM: CPT

## 2017-10-17 PROCEDURE — 83690 ASSAY OF LIPASE: CPT | Performed by: EMERGENCY MEDICINE

## 2017-10-17 PROCEDURE — 84484 ASSAY OF TROPONIN QUANT: CPT | Performed by: EMERGENCY MEDICINE

## 2017-10-17 PROCEDURE — 71275 CT ANGIOGRAPHY CHEST: CPT

## 2017-10-17 RX ORDER — ACETAMINOPHEN 325 MG/1
975 TABLET ORAL ONCE
Status: COMPLETED | OUTPATIENT
Start: 2017-10-17 | End: 2017-10-17

## 2017-10-17 RX ORDER — CITALOPRAM 10 MG/1
10 TABLET ORAL DAILY
COMMUNITY
End: 2017-11-07 | Stop reason: HOSPADM

## 2017-10-17 RX ADMIN — ACETAMINOPHEN 975 MG: 325 TABLET ORAL at 11:05

## 2017-10-17 RX ADMIN — IOHEXOL 100 ML: 350 INJECTION, SOLUTION INTRAVENOUS at 10:38

## 2017-10-17 NOTE — ED PROVIDER NOTES
History  Chief Complaint   Patient presents with    Abdominal Pain     c/o nausea, and LUQ pain that radiates into back s/p "abusing inhalants (computer ) I got this bad pain and then I don't know what happened   don't know if I blacked out and fell or not"  Patient notes that he was abusing air Duster yesterday morning when he had sudden onset severe left flank pain- he has tenderness to the left chest wall and abdomen  Pain is nonradiating  No vomiting or diarrhea  No shortness of breath  He abuses air duster and has a gap in his memory  The pain is worse with movement and coughing  No signs of head trauma or headache  No shortness of breath  No f/c/s  No lightheadedness  He is AOx3  No slurred speech  No CN defecits  Abdomen is tender on the left side to the back  No bruising to the abdomen  No hematuria  Denies any drug or alcohol use other than the air duster  MDM well appearing 42 yom here with LUQ pain after be using him once  He also has a gap in his memory  Doubt trauma as the patient had sudden onset pain in remembers the time of onset but we cannot be completely sure about his story  Will check for spontaneous pneumothorax versus other pathological intra-abdominal process  He does have left upper quadrant tenderness on abdominal exam             Prior to Admission Medications   Prescriptions Last Dose Informant Patient Reported? Taking?    ARIPiprazole (ABILIFY) 10 mg tablet   Yes Yes   Sig: Take 10 mg by mouth daily after dinner     citalopram (CeleXA) 10 mg tablet   Yes Yes   Sig: Take 10 mg by mouth daily   ibuprofen (MOTRIN) 200 mg tablet   Yes Yes   Sig: Take 200 mg by mouth every 8 (eight) hours as needed     levothyroxine 175 mcg tablet   Yes Yes   Sig: Take 175 mcg by mouth daily   ondansetron (ZOFRAN-ODT) 4 mg disintegrating tablet   No No   Sig: Take 1 tablet by mouth every 6 (six) hours as needed for nausea or vomiting for up to 30 days      Facility-Administered Medications: None       Past Medical History:   Diagnosis Date    Cancer [facility])     Thyroid Cancer    Depression     Disease of thyroid gland     Inhalation of noxious substance     Psychiatric disorder        Past Surgical History:   Procedure Laterality Date    ARTHROSCOPY KNEE Right     CO THYROIDECTOMY N/A 1/23/2017    Procedure: TOTAL THYROIDECTOMY;  Surgeon: Elpidio Ruiz MD;  Location: [facility];  Service: Surgical Oncology       Family History   Problem Relation Age of Onset    Diabetes Mother     Cancer Father      I have reviewed and agree with the history as documented  Social History   Substance Use Topics    Smoking status: Current Every Day Smoker    Smokeless tobacco: Never Used      Comment: quit in January    Alcohol use No        Review of Systems   Constitutional: Negative for chills and fever  HENT: Negative for ear pain and hearing loss  Respiratory: Negative for chest tightness and shortness of breath  Cardiovascular: Negative for chest pain and leg swelling  Gastrointestinal: Positive for abdominal pain  Negative for diarrhea and nausea  Genitourinary: Positive for flank pain  Negative for dysuria and hematuria  Musculoskeletal: Negative for joint swelling and neck stiffness  Skin: Negative for rash  Neurological: Negative for seizures and headaches  Psychiatric/Behavioral: Negative for hallucinations and suicidal ideas  All other systems reviewed and are negative  Physical Exam  ED Triage Vitals [10/17/17 0909]   Temperature Pulse Respirations Blood Pressure SpO2   97 9 °F (36 6 °C) 94 17 142/88 97 %      Temp Source Heart Rate Source Patient Position - Orthostatic VS BP Location FiO2 (%)   Oral Monitor Sitting Left arm --      Pain Score       8            Physical Exam   Constitutional: He is oriented to person, place, and time  He appears well-developed and well-nourished  HENT:   Head: Normocephalic and atraumatic     Eyes: EOM are normal  Pupils are equal, round, and reactive to light  Neck: Normal range of motion  Neck supple  Cardiovascular: Normal rate, regular rhythm and normal heart sounds  No murmur heard  Pulmonary/Chest: Effort normal  No respiratory distress  He has no wheezes  Slight decrease in breath sounds on left side, no JVD, no tracheal deviation  Abdominal: Soft  Bowel sounds are normal  He exhibits no distension  There is tenderness  Musculoskeletal: Normal range of motion  He exhibits no edema or tenderness  Neurological: He is alert and oriented to person, place, and time  No cranial nerve deficit  Coordination normal    Skin: Skin is warm and dry  He is not diaphoretic  No erythema  Psychiatric: He has a normal mood and affect  His behavior is normal    Nursing note and vitals reviewed  ED Medications  Medications   iohexol (OMNIPAQUE) 350 MG/ML injection (MULTI-DOSE) 100 mL (100 mL Intravenous Given 10/17/17 1038)   acetaminophen (TYLENOL) tablet 975 mg (975 mg Oral Given 10/17/17 1105)       Diagnostic Studies  Labs Reviewed   COMPREHENSIVE METABOLIC PANEL - Abnormal        Result Value Ref Range Status    Glucose 156 (*) 65 - 140 mg/dL Final    Comment:   If the patient is fasting, the ADA then defines impaired fasting glucose as > 100 mg/dL and diabetes as > or equal to 123 mg/dL  Specimen collection should occur prior to Sulfasalazine administration due to the potential for falsely depressed results  Specimen collection should occur prior to Sulfapyridine administration due to the potential for falsely elevated results      Alkaline Phosphatase 119 (*) 46 - 116 U/L Final    Sodium 141  136 - 145 mmol/L Final    Potassium 3 7  3 5 - 5 3 mmol/L Final    Chloride 105  100 - 108 mmol/L Final    CO2 25  21 - 32 mmol/L Final    Anion Gap 11  4 - 13 mmol/L Final    BUN 10  5 - 25 mg/dL Final    Creatinine 0 96  0 60 - 1 30 mg/dL Final    Comment: Standardized to IDMS reference method    Calcium 8 8  8 3 - 10 1 mg/dL Final    AST 26  5 - 45 U/L Final    Comment:   Specimen collection should occur prior to Sulfasalazine administration due to the potential for falsely depressed results  ALT 64  12 - 78 U/L Final    Comment:   Specimen collection should occur prior to Sulfasalazine administration due to the potential for falsely depressed results  Total Protein 7 5  6 4 - 8 2 g/dL Final    Albumin 3 9  3 5 - 5 0 g/dL Final    Total Bilirubin 0 40  0 20 - 1 00 mg/dL Final    eGFR 99  ml/min/1 73sq m Final    Narrative:     National Kidney Disease Education Program recommendations are as follows:  GFR calculation is accurate only with a steady state creatinine  Chronic Kidney disease less than 60 ml/min/1 73 sq  meters  Kidney failure less than 15 ml/min/1 73 sq  meters     CBC AND DIFFERENTIAL - Normal    WBC 5 77  4 31 - 10 16 Thousand/uL Final    RBC 5 01  3 88 - 5 62 Million/uL Final    Hemoglobin 14 5  12 0 - 17 0 g/dL Final    Hematocrit 43 5  36 5 - 49 3 % Final    MCV 87  82 - 98 fL Final    MCH 28 9  26 8 - 34 3 pg Final    MCHC 33 3  31 4 - 37 4 g/dL Final    RDW 13 2  11 6 - 15 1 % Final    MPV 9 2  8 9 - 12 7 fL Final    Platelets 286  646 - 390 Thousands/uL Final    Neutrophils Relative 71  43 - 75 % Final    Lymphocytes Relative 21  14 - 44 % Final    Monocytes Relative 5  4 - 12 % Final    Eosinophils Relative 3  0 - 6 % Final    Basophils Relative 0  0 - 1 % Final    Neutrophils Absolute 4 14  1 85 - 7 62 Thousands/µL Final    Lymphocytes Absolute 1 20  0 60 - 4 47 Thousands/µL Final    Monocytes Absolute 0 26  0 17 - 1 22 Thousand/µL Final    Eosinophils Absolute 0 16  0 00 - 0 61 Thousand/µL Final    Basophils Absolute 0 01  0 00 - 0 10 Thousands/µL Final   LIPASE - Normal    Lipase 111  73 - 393 u/L Final   UA W REFLEX TO MICROSCOPIC WITH REFLEX TO CULTURE - Normal    Color, UA Light Yellow   Final    Clarity, UA Clear   Final    Specific Gravity, UA <=1 005  1 003 - 1 030 Final    pH, UA 6 0  4 5 - 8 0 Final    Leukocytes, UA Negative  Negative Final    Nitrite, UA Negative  Negative Final    Protein, UA Negative  Negative mg/dl Final    Glucose, UA Negative  Negative mg/dl Final    Ketones, UA Negative  Negative mg/dl Final    Urobilinogen, UA 0 2  0 2, 1 0 E U /dl E U /dl Final    Bilirubin, UA Negative  Negative Final    Blood, UA Negative  Negative Final   TROPONIN I - Normal    Troponin I <0 02  <=0 04 ng/mL Final    Narrative:     [employer] Chemistry analyzer 99% cutoff is > 0 04 ng/mL in network labs    o cTnI 99% cutoff is useful only when applied to patients in the clinical setting of myocardial ischemia  o cTnI 99% cutoff should be interpreted in the context of clinical history, ECG findings and possibly cardiac imaging to establish correct diagnosis  o cTnI 99% cutoff may be suggestive but clearly not indicative of a coronary event without the clinical setting of myocardial ischemia  CK - Normal    Total   39 - 308 U/L Final       CTA chest abdomen pelvis w wo contrast   Final Result      No acute pathology  No aortic aneurysm or dissection  Mild infiltration of the subcutaneous fat in the left abdomen, possibly representing contusions  Workstation performed: [id number]             Procedures  Procedures      Phone Contacts  ED Phone Contact    ED Course  ED Course as of Oct 21 0806   Tue Oct 17, 2017   1013 EKG rate of 80, sinus, narrow QRS, no STEMI, normal axis  1142 No acute pathology   No aortic aneurysm or dissection  Mild infiltration of the subcutaneous fat in the left abdomen, possibly representing contusions  [facility]  CritCare Time    Disposition  Final diagnoses:   Soft tissue injury   Inhalant abuse     ED Disposition     ED Disposition Condition Comment    Discharge  Sukh Morel discharge to home/self care      Condition at discharge: Good    The patient (and any family present) verbalized understanding of the discharge instructions and warnings that would necessitate return to the Emergency Department  Gave verbal  in addition to written discharge instructions  Specifically highlighted areas of special concern regarding the written and verbal discharge instructions and return precautions  All questions were answered prior to discharge  Follow-up Information     Follow up With Specialties Details Why Contact Info    Primary Care Doctor            Discharge Medication List as of 10/17/2017  2:08 PM      CONTINUE these medications which have NOT CHANGED    Details   ARIPiprazole (ABILIFY) 10 mg tablet Take 10 mg by mouth daily after dinner  , Until Discontinued, Historical Med      citalopram (CeleXA) 10 mg tablet Take 10 mg by mouth daily, Historical Med      ibuprofen (MOTRIN) 200 mg tablet Take 200 mg by mouth every 8 (eight) hours as needed  , Historical Med      levothyroxine 175 mcg tablet Take 175 mcg by mouth daily, Until Discontinued, Historical Med      ondansetron (ZOFRAN-ODT) 4 mg disintegrating tablet Take 1 tablet by mouth every 6 (six) hours as needed for nausea or vomiting for up to 30 days, Starting Sat 3/11/2017, Until Thu 7/13/2017, Print      omeprazole (PriLOSEC) 20 mg delayed release capsule Take 1 capsule by mouth daily for 14 days, Starting Thu 7/13/2017, Until Thu 7/27/2017, Print           No discharge procedures on file      ED Provider  Electronically Signed by       Jhoana Mccarthy MD  10/21/17 1182

## 2017-10-17 NOTE — ED NOTES
Discharge instructions reviewed with pt by Dr Ruth Byrd   Pt ambulated to waiting room-steady gait noted     Eden Brewer RN  10/17/17 2830

## 2017-10-17 NOTE — DISCHARGE INSTRUCTIONS
Your seen here for left-sided pain and chest tenderness along with belly tenderness  He did have some possible bruising to the soft tissues but no other obvious explanation for your pain  If this pain worsens  If he have any pain that goes to the chest or any lightheadedness or dizziness  Please return to the emergency department  Please follow-up with your primary care doctor or call the number, [email or web address], to follow up with the primary care doctor as soon as possible  Abdominal Pain   WHAT YOU NEED TO KNOW:   Abdominal pain can be dull, achy, or sharp  You may have pain in one area of your abdomen, or in your entire abdomen  Your pain may be caused by a condition such as constipation, food sensitivity or poisoning, infection, or a blockage  Abdominal pain can also be from a hernia, appendicitis, or an ulcer  Liver, gallbladder, or kidney conditions can also cause abdominal pain  The cause of your abdominal pain may be unknown  DISCHARGE INSTRUCTIONS:   Return to the emergency department if:   · You have new chest pain or shortness of breath  · You have pulsing pain in your upper abdomen or lower back that suddenly becomes constant  · Your pain is in the right lower abdominal area and worsens with movement  · You have a fever over 100 4°F (38°C) or shaking chills  · You are vomiting and cannot keep food or liquids down  · Your pain does not improve or gets worse over the next 8 to 12 hours  · You see blood in your vomit or bowel movements, or they look black and tarry  · Your skin or the whites of your eyes turn yellow  · You are a woman and have a large amount of vaginal bleeding that is not your monthly period  Contact your healthcare provider if:   · You have pain in your lower back  · You are a man and have pain in your testicles  · You have pain when you urinate  · You have questions or concerns about your condition or care    Follow up with your healthcare provider within 24 hours or as directed:  Write down your questions so you remember to ask them during your visits  Medicines:   · Medicines  may be given to calm your stomach and prevent vomiting or to decrease pain  Ask how to take pain medicine safely  · Take your medicine as directed  Contact your healthcare provider if you think your medicine is not helping or if you have side effects  Tell him of her if you are allergic to any medicine  Keep a list of the medicines, vitamins, and herbs you take  Include the amounts, and when and why you take them  Bring the list or the pill bottles to follow-up visits  Carry your medicine list with you in case of an emergency  © 2017 [employer] Information is for End User's use only and may not be sold, redistributed or otherwise used for commercial purposes  All illustrations and images included in CareNotes® are the copyrighted property of [employer]  or [employer]  The above information is an  only  It is not intended as medical advice for individual conditions or treatments  Talk to your doctor, nurse or pharmacist before following any medical regimen to see if it is safe and effective for you  Chest Pain   WHAT YOU NEED TO KNOW:   Chest pain can be caused by a range of conditions, from not serious to life-threatening  Chest pain can be a symptom of a digestive problem, such as acid reflux or a stomach ulcer  An anxiety attack or a strong emotion, such as anger, can also cause chest pain  Infection, inflammation, or a fracture in the bones or cartilage in your chest can cause pain or discomfort  Sometimes chest pain or pressure is caused by poor blood flow to your heart (angina)  Chest pain may also be caused by life-threatening conditions such as a heart attack or blood clot in your lungs     DISCHARGE INSTRUCTIONS:   Call 911 if:   · You have any of the following signs of a heart attack:      ¨ Squeezing, pressure, or pain in your chest that lasts longer than 5 minutes or returns    ¨ Discomfort or pain in your back, neck, jaw, stomach, or arm     ¨ Trouble breathing    ¨ Nausea or vomiting    ¨ Lightheadedness or a sudden cold sweat, especially with chest pain or trouble breathing    Return to the emergency department if:   · You have chest discomfort that gets worse, even with medicine  · You cough or vomit blood  · Your bowel movements are black or bloody  · You cannot stop vomiting, or it hurts to swallow  Contact your healthcare provider if:   · You have questions or concerns about your condition or care  Medicines:   · Medicines  may be given to treat the cause of your chest pain  Examples include pain medicine, anxiety medicine, or medicines to increase blood flow to your heart  · Do not take certain medicines without asking your healthcare provider first   These include NSAIDs, herbal or vitamin supplements, or hormones (estrogen or progestin)  · Take your medicine as directed  Contact your healthcare provider if you think your medicine is not helping or if you have side effects  Tell him or her if you are allergic to any medicine  Keep a list of the medicines, vitamins, and herbs you take  Include the amounts, and when and why you take them  Bring the list or the pill bottles to follow-up visits  Carry your medicine list with you in case of an emergency  Follow up with your healthcare provider within 72 hours, or as directed: You may need to return for more tests to find the cause of your chest pain  You may be referred to a specialist, such as a cardiologist or gastroenterologist  Write down your questions so you remember to ask them during your visits  Healthy living tips: The following are general healthy guidelines  If your chest pain is caused by a heart problem, your healthcare provider will give you specific guidelines to follow  · Do not smoke    Nicotine and other chemicals in cigarettes and cigars can cause lung and heart damage  Ask your healthcare provider for information if you currently smoke and need help to quit  E-cigarettes or smokeless tobacco still contain nicotine  Talk to your healthcare provider before you use these products  · Eat a variety of healthy, low-fat foods  Healthy foods include fruits, vegetables, whole-grain breads, low-fat dairy products, beans, lean meats, and fish  Ask for more information about a heart healthy diet  · Ask about activity  Your healthcare provider will tell you which activities to limit or avoid  Ask when you can drive, return to work, and have sex  Ask about the best exercise plan for you  · Maintain a healthy weight  Ask your healthcare provider how much you should weigh  Ask him or her to help you create a weight loss plan if you are overweight  · Get the flu and pneumonia vaccines  All adults should get the influenza (flu) vaccine  Get it every year as soon as it becomes available  The pneumococcal vaccine is given to adults aged 72 years or older  The vaccine is given every 5 years to prevent pneumococcal disease, such as pneumonia  © 2017 [employer] Information is for End User's use only and may not be sold, redistributed or otherwise used for commercial purposes  All illustrations and images included in CareNotes® are the copyrighted property of [employer]  or [employer] Alaniz  The above information is an  only  It is not intended as medical advice for individual conditions or treatments  Talk to your doctor, nurse or pharmacist before following any medical regimen to see if it is safe and effective for you

## 2017-10-18 ENCOUNTER — APPOINTMENT (EMERGENCY)
Dept: CT IMAGING | Facility: HOSPITAL | Age: 37
End: 2017-10-18
Payer: COMMERCIAL

## 2017-10-18 ENCOUNTER — HOSPITAL ENCOUNTER (EMERGENCY)
Facility: HOSPITAL | Age: 37
Discharge: HOME | End: 2017-10-18
Attending: EMERGENCY MEDICINE | Admitting: EMERGENCY MEDICINE
Payer: COMMERCIAL

## 2017-10-18 VITALS
BODY MASS INDEX: 35.1 KG/M2 | TEMPERATURE: 97.9 F | HEART RATE: 80 BPM | WEIGHT: 259.1 LBS | HEIGHT: 72 IN | RESPIRATION RATE: 18 BRPM | OXYGEN SATURATION: 97 % | DIASTOLIC BLOOD PRESSURE: 91 MMHG | SYSTOLIC BLOOD PRESSURE: 137 MMHG

## 2017-10-18 DIAGNOSIS — S30.1XXA CONTUSION OF ABDOMINAL WALL, INITIAL ENCOUNTER: Primary | ICD-10-CM

## 2017-10-18 LAB
ABO GROUP BLD: NORMAL
ANION GAP SERPL CALCULATED.3IONS-SCNC: 9 MMOL/L (ref 4–13)
BASOPHILS # BLD AUTO: 0.01 THOUSANDS/ΜL (ref 0–0.1)
BASOPHILS NFR BLD AUTO: 0 % (ref 0–1)
BLD GP AB SCN SERPL QL: NEGATIVE
BUN SERPL-MCNC: 14 MG/DL (ref 5–25)
CALCIUM SERPL-MCNC: 8.8 MG/DL (ref 8.3–10.1)
CHLORIDE SERPL-SCNC: 104 MMOL/L (ref 100–108)
CO2 SERPL-SCNC: 26 MMOL/L (ref 21–32)
CREAT SERPL-MCNC: 0.95 MG/DL (ref 0.6–1.3)
EOSINOPHIL # BLD AUTO: 0.18 THOUSAND/ΜL (ref 0–0.61)
EOSINOPHIL NFR BLD AUTO: 3 % (ref 0–6)
ERYTHROCYTE [DISTWIDTH] IN BLOOD BY AUTOMATED COUNT: 13.3 % (ref 11.6–15.1)
GFR SERPL CREATININE-BSD FRML MDRD: 100 ML/MIN/1.73SQ M
GLUCOSE SERPL-MCNC: 122 MG/DL (ref 65–140)
HCT VFR BLD AUTO: 43.6 % (ref 36.5–49.3)
HGB BLD-MCNC: 14.3 G/DL (ref 12–17)
HOLD SPECIMEN: NORMAL
LYMPHOCYTES # BLD AUTO: 1.35 THOUSANDS/ΜL (ref 0.6–4.47)
LYMPHOCYTES NFR BLD AUTO: 21 % (ref 14–44)
MCH RBC QN AUTO: 28.7 PG (ref 26.8–34.3)
MCHC RBC AUTO-ENTMCNC: 32.8 G/DL (ref 31.4–37.4)
MCV RBC AUTO: 87 FL (ref 82–98)
MONOCYTES # BLD AUTO: 0.33 THOUSAND/ΜL (ref 0.17–1.22)
MONOCYTES NFR BLD AUTO: 5 % (ref 4–12)
NEUTROPHILS # BLD AUTO: 4.6 THOUSANDS/ΜL (ref 1.85–7.62)
NEUTS SEG NFR BLD AUTO: 71 % (ref 43–75)
PLATELET # BLD AUTO: 243 THOUSANDS/UL (ref 149–390)
PMV BLD AUTO: 9.2 FL (ref 8.9–12.7)
POTASSIUM SERPL-SCNC: 3.9 MMOL/L (ref 3.5–5.3)
RBC # BLD AUTO: 4.99 MILLION/UL (ref 3.88–5.62)
RH BLD: NEGATIVE
SODIUM SERPL-SCNC: 139 MMOL/L (ref 136–145)
SPECIMEN EXPIRATION DATE: NORMAL
WBC # BLD AUTO: 6.47 THOUSAND/UL (ref 4.31–10.16)

## 2017-10-18 PROCEDURE — 86850 RBC ANTIBODY SCREEN: CPT | Performed by: EMERGENCY MEDICINE

## 2017-10-18 PROCEDURE — 80048 BASIC METABOLIC PNL TOTAL CA: CPT | Performed by: EMERGENCY MEDICINE

## 2017-10-18 PROCEDURE — 36415 COLL VENOUS BLD VENIPUNCTURE: CPT | Performed by: EMERGENCY MEDICINE

## 2017-10-18 PROCEDURE — 74177 CT ABD & PELVIS W/CONTRAST: CPT

## 2017-10-18 PROCEDURE — 86900 BLOOD TYPING SEROLOGIC ABO: CPT | Performed by: EMERGENCY MEDICINE

## 2017-10-18 PROCEDURE — 99284 EMERGENCY DEPT VISIT MOD MDM: CPT

## 2017-10-18 PROCEDURE — 96374 THER/PROPH/DIAG INJ IV PUSH: CPT

## 2017-10-18 PROCEDURE — 85025 COMPLETE CBC W/AUTO DIFF WBC: CPT | Performed by: EMERGENCY MEDICINE

## 2017-10-18 PROCEDURE — 86901 BLOOD TYPING SEROLOGIC RH(D): CPT | Performed by: EMERGENCY MEDICINE

## 2017-10-18 RX ORDER — NAPROXEN 500 MG/1
500 TABLET ORAL 2 TIMES DAILY PRN
Qty: 15 TABLET | Refills: 0 | Status: SHIPPED | OUTPATIENT
Start: 2017-10-18 | End: 2017-10-31

## 2017-10-18 RX ORDER — KETOROLAC TROMETHAMINE 30 MG/ML
15 INJECTION, SOLUTION INTRAMUSCULAR; INTRAVENOUS ONCE
Status: COMPLETED | OUTPATIENT
Start: 2017-10-18 | End: 2017-10-18

## 2017-10-18 RX ADMIN — KETOROLAC TROMETHAMINE 15 MG: 30 INJECTION, SOLUTION INTRAMUSCULAR at 08:12

## 2017-10-18 RX ADMIN — IOHEXOL 100 ML: 350 INJECTION, SOLUTION INTRAVENOUS at 10:46

## 2017-10-18 NOTE — ED NOTES
D/C instructions given to pt  By Dr Azul Shaw  Pt  With no acute distress on d/c  Pt  Awake and alert, no questions at time of departure  IV removed  Pt  Ambulated out of dept        Lizzette Wynne RN  10/18/17 8596

## 2017-10-18 NOTE — ED NOTES
Pt  To have repeat ct scan  Unable to do ct scan until 1030 due to recent scan yesterday  Pt  Updated on plan at this time  Pt  Aware he is NPO till study is completed and read by MD  No further needs at this time  Pt  Resting in bed, lights dimmed  Pt  Watching TV        Katarina Middleton RN  10/18/17 2638

## 2017-10-18 NOTE — DISCHARGE INSTRUCTIONS

## 2017-10-18 NOTE — ED PROVIDER NOTES
History  Chief Complaint   Patient presents with    Abdominal Pain     Pt  c/o worsening left sided abdominal pain  Pt  was seen in ER yesterday  Pt  reports mild nausea but denies vomiting or diarrhea  77-year-old male returns to the emergency department of left upper quadrant abdominal pain  Patient was seen here yesterday for same had extensive workup and was discharged with soft tissue injury  Patient states that his pain has progressively worsened since his discharge  Patient had a presumed fall 2 days ago after coughing computer   Has had worsening left upper quadrant pain since then  Yesterday he was seen here had a CTA chest abdomen pelvis which did not reveal any acute splenic injury however revealed some soft tissue contusion/hematoma  Patient states that his pain has worsened  Patient now has bruising over his left upper quadrant which was not present yesterday on exam according to PA student who examined him  History provided by:  Patient   used: No    Abdominal Pain   Pain location:  LUQ  Pain quality: aching    Pain radiates to:  Does not radiate  Pain severity:  Moderate  Onset quality:  Sudden  Duration:  2 days  Timing:  Constant  Progression:  Worsening  Chronicity:  New  Context: trauma    Relieved by:  Nothing  Worsened by:  Palpation and movement  Ineffective treatments:  NSAIDs  Associated symptoms: no chest pain, no chills, no diarrhea, no dysuria, no fever, no nausea, no shortness of breath and no vomiting    Risk factors: obesity        Prior to Admission Medications   Prescriptions Last Dose Informant Patient Reported? Taking?    ARIPiprazole (ABILIFY) 10 mg tablet   Yes Yes   Sig: Take 10 mg by mouth daily after dinner     citalopram (CeleXA) 10 mg tablet   Yes Yes   Sig: Take 10 mg by mouth daily   ibuprofen (MOTRIN) 200 mg tablet   Yes Yes   Sig: Take 200 mg by mouth every 8 (eight) hours as needed     levothyroxine 175 mcg tablet   Yes Yes Sig: Take 175 mcg by mouth daily   ondansetron (ZOFRAN-ODT) 4 mg disintegrating tablet   No Yes   Sig: Take 1 tablet by mouth every 6 (six) hours as needed for nausea or vomiting for up to 30 days      Facility-Administered Medications: None       Past Medical History:   Diagnosis Date    Cancer ([facility] [address] )     Thyroid Cancer    Depression     Disease of thyroid gland     Inhalation of noxious substance     Psychiatric disorder        Past Surgical History:   Procedure Laterality Date    ARTHROSCOPY KNEE Right     [place] THYROIDECTOMY N/A 1/23/2017    Procedure: TOTAL THYROIDECTOMY;  Surgeon: Jesus Jones MD;  Location: [facility];  Service: Surgical Oncology       Family History   Problem Relation Age of Onset    Diabetes Mother     Cancer Father      I have reviewed and agree with the history as documented  Social History   Substance Use Topics    Smoking status: Current Every Day Smoker    Smokeless tobacco: Never Used      Comment: quit in January    Alcohol use No        Review of Systems   Constitutional: Negative for chills, diaphoresis and fever  Respiratory: Negative for shortness of breath  Cardiovascular: Negative for chest pain and palpitations  Gastrointestinal: Positive for abdominal pain  Negative for diarrhea, nausea and vomiting  Genitourinary: Negative for dysuria and frequency  Skin: Negative for rash  All other systems reviewed and are negative  Physical Exam  ED Triage Vitals [10/18/17 0743]   Temperature Pulse Respirations Blood Pressure SpO2   97 9 °F (36 6 °C) 88 18 149/93 94 %      Temp Source Heart Rate Source Patient Position - Orthostatic VS BP Location FiO2 (%)   Oral Monitor Lying Right arm --      Pain Score       Worst Possible Pain           Physical Exam   Constitutional: He is oriented to person, place, and time  He appears well-developed and well-nourished  He appears distressed (mild)  HENT:   Head: Normocephalic and atraumatic     Eyes: EOM are normal  Pupils are equal, round, and reactive to light  Neck: Normal range of motion  No JVD present  Cardiovascular: Normal rate, regular rhythm, normal heart sounds and intact distal pulses  Exam reveals no gallop and no friction rub  No murmur heard  Pulmonary/Chest: Effort normal and breath sounds normal  No respiratory distress  He has no wheezes  He has no rales  He exhibits no tenderness  Abdominal: There is tenderness (left upper quadrant)  Musculoskeletal: Normal range of motion  He exhibits no tenderness  Neurological: He is alert and oriented to person, place, and time  Skin: Skin is warm and dry  Capillary refill takes less than 2 seconds  Bruising left upper quadrant     Psychiatric: He has a normal mood and affect  His behavior is normal  Judgment and thought content normal    Nursing note and vitals reviewed        ED Medications  Medications   ketorolac (TORADOL) 30 mg/mL injection 15 mg (15 mg Intravenous Given 10/18/17 0812)   iohexol (OMNIPAQUE) 350 MG/ML injection (MULTI-DOSE) 100 mL (100 mL Intravenous Given 10/18/17 1046)       Diagnostic Studies  Labs Reviewed   CBC AND DIFFERENTIAL - Normal       Result Value Ref Range Status    WBC 6 47  4 31 - 10 16 Thousand/uL Final    RBC 4 99  3 88 - 5 62 Million/uL Final    Hemoglobin 14 3  12 0 - 17 0 g/dL Final    Hematocrit 43 6  36 5 - 49 3 % Final    MCV 87  82 - 98 fL Final    MCH 28 7  26 8 - 34 3 pg Final    MCHC 32 8  31 4 - 37 4 g/dL Final    RDW 13 3  11 6 - 15 1 % Final    MPV 9 2  8 9 - 12 7 fL Final    Platelets 606  501 - 390 Thousands/uL Final    Neutrophils Relative 71  43 - 75 % Final    Lymphocytes Relative 21  14 - 44 % Final    Monocytes Relative 5  4 - 12 % Final    Eosinophils Relative 3  0 - 6 % Final    Basophils Relative 0  0 - 1 % Final    Neutrophils Absolute 4 60  1 85 - 7 62 Thousands/µL Final    Lymphocytes Absolute 1 35  0 60 - 4 47 Thousands/µL Final    Monocytes Absolute 0 33  0 17 - 1 22 Thousand/µL Final Eosinophils Absolute 0 18  0 00 - 0 61 Thousand/µL Final    Basophils Absolute 0 01  0 00 - 0 10 Thousands/µL Final   BASIC METABOLIC PANEL    Sodium 334  136 - 145 mmol/L Final    Potassium 3 9  3 5 - 5 3 mmol/L Final    Chloride 104  100 - 108 mmol/L Final    CO2 26  21 - 32 mmol/L Final    Anion Gap 9  4 - 13 mmol/L Final    BUN 14  5 - 25 mg/dL Final    Creatinine 0 95  0 60 - 1 30 mg/dL Final    Comment: Standardized to IDMS reference method    Glucose 122  65 - 140 mg/dL Final    Comment:   If the patient is fasting, the ADA then defines impaired fasting glucose as > 100 mg/dL and diabetes as > or equal to 123 mg/dL  Specimen collection should occur prior to Sulfasalazine administration due to the potential for falsely depressed results  Specimen collection should occur prior to Sulfapyridine administration due to the potential for falsely elevated results  Calcium 8 8  8 3 - 10 1 mg/dL Final    eGFR 100  ml/min/1 73sq m Final    Narrative:     National Kidney Disease Education Program recommendations are as follows:  GFR calculation is accurate only with a steady state creatinine  Chronic Kidney disease less than 60 ml/min/1 73 sq  meters  Kidney failure less than 15 ml/min/1 73 sq  meters  RAINBOW DRAW    Narrative: The following orders were created for panel order [place] draw  Procedure                               Abnormality         Status                     ---------                               -----------         ------                     Ruth Sep Top on [facility][[id number]]                            Final result               Green / Black tube on [facility][[id number]]                        Final result                 Please view results for these tests on the individual orders     TYPE AND SCREEN    ABO Grouping O   Final    Rh Factor Negative   Final    Antibody Screen Negative   Final    Specimen Expiration Date [id number]   Final   LIGHT BLUE TOP   GREEN / BLACK TUBE ON HOLD    Extra Tube Hold for add-ons  Final    Comment: Auto resulted  CT abdomen pelvis with contrast   Final Result      No evidence of solid organ injury  No acute intra-abdominal abnormality  No free air or free fluid  Mild subcutaneous inflammatory stranding involving the left anterior midabdomen likely posttraumatic in nature  Workstation performed: [id number]             Procedures  Procedures      Phone Contacts  ED Phone Contact    ED Course  ED Course                                MDM  Number of Diagnoses or Management Options  Contusion of abdominal wall, initial encounter: new and requires workup  Diagnosis management comments: Background: 42 y o  male presents with left upper quadrant abdominal pain after presumed trauma    Differential DX includes but is not limited to:  Delayed splenic injury, soft tissue injury, rib fracture, expanding hematoma    Plan:  CBC, type and screen, metabolic panel, CT abdomen with contrast, symptom control         Amount and/or Complexity of Data Reviewed  Clinical lab tests: ordered and reviewed  Tests in the radiology section of CPT®: ordered and reviewed    Risk of Complications, Morbidity, and/or Mortality  Presenting problems: high  Diagnostic procedures: high  Management options: high    Patient Progress  Patient progress: stable    CritCare Time    Disposition  Final diagnoses:   Contusion of abdominal wall, initial encounter     ED Disposition     ED Disposition Condition Comment    Discharge  Shirley Hinojosa discharge to home/self care  Condition at discharge: Good        Follow-up Information    None       Patient's Medications   Discharge Prescriptions    NAPROXEN (NAPROSYN) 500 MG TABLET    Take 1 tablet by mouth 2 (two) times a day as needed for mild pain for up to 15 doses       Start Date: 10/18/2017End Date: --       Order Dose: 500 mg       Quantity: 15 tablet    Refills: 0     No discharge procedures on file      ED Provider  Electronically Signed by       Andrey Shaikh MD  10/18/17 9846

## 2017-10-31 ENCOUNTER — APPOINTMENT (EMERGENCY)
Dept: ULTRASOUND IMAGING | Facility: HOSPITAL | Age: 37
End: 2017-10-31
Payer: COMMERCIAL

## 2017-10-31 ENCOUNTER — HOSPITAL ENCOUNTER (EMERGENCY)
Facility: HOSPITAL | Age: 37
End: 2017-11-01
Attending: EMERGENCY MEDICINE | Admitting: EMERGENCY MEDICINE
Payer: COMMERCIAL

## 2017-10-31 ENCOUNTER — APPOINTMENT (EMERGENCY)
Dept: RADIOLOGY | Facility: HOSPITAL | Age: 37
End: 2017-10-31
Payer: COMMERCIAL

## 2017-10-31 VITALS
DIASTOLIC BLOOD PRESSURE: 81 MMHG | HEIGHT: 72 IN | BODY MASS INDEX: 35.33 KG/M2 | OXYGEN SATURATION: 98 % | RESPIRATION RATE: 16 BRPM | SYSTOLIC BLOOD PRESSURE: 131 MMHG | WEIGHT: 260.8 LBS | HEART RATE: 92 BPM | TEMPERATURE: 98.3 F

## 2017-10-31 DIAGNOSIS — F32.A DEPRESSION: Primary | ICD-10-CM

## 2017-10-31 DIAGNOSIS — T65.91XA: ICD-10-CM

## 2017-10-31 LAB
ALBUMIN SERPL BCP-MCNC: 4.7 G/DL (ref 3.5–5)
ALP SERPL-CCNC: 171 U/L (ref 46–116)
ALT SERPL W P-5'-P-CCNC: 110 U/L (ref 12–78)
ANION GAP SERPL CALCULATED.3IONS-SCNC: 13 MMOL/L (ref 4–13)
APAP SERPL-MCNC: <2 UG/ML (ref 10–30)
AST SERPL W P-5'-P-CCNC: 57 U/L (ref 5–45)
ATRIAL RATE: 116 BPM
BASE EX.OXY STD BLDV CALC-SCNC: 84.9 % (ref 60–80)
BASE EXCESS BLDV CALC-SCNC: 0.4 MMOL/L
BASOPHILS # BLD AUTO: 0.04 THOUSANDS/ΜL (ref 0–0.1)
BASOPHILS NFR BLD AUTO: 0 % (ref 0–1)
BILIRUB SERPL-MCNC: 0.4 MG/DL (ref 0.2–1)
BUN SERPL-MCNC: 15 MG/DL (ref 5–25)
CALCIUM SERPL-MCNC: 9.1 MG/DL (ref 8.3–10.1)
CHLORIDE SERPL-SCNC: 103 MMOL/L (ref 100–108)
CO2 SERPL-SCNC: 28 MMOL/L (ref 21–32)
CREAT SERPL-MCNC: 0.94 MG/DL (ref 0.6–1.3)
EOSINOPHIL # BLD AUTO: 0.03 THOUSAND/ΜL (ref 0–0.61)
EOSINOPHIL NFR BLD AUTO: 0 % (ref 0–6)
ERYTHROCYTE [DISTWIDTH] IN BLOOD BY AUTOMATED COUNT: 13 % (ref 11.6–15.1)
GAS + CO PNL BLDA: 1.9 % (ref 0–1.5)
GFR SERPL CREATININE-BSD FRML MDRD: 102 ML/MIN/1.73SQ M
GLUCOSE SERPL-MCNC: 142 MG/DL (ref 65–140)
HCO3 BLDV-SCNC: 25.2 MMOL/L (ref 24–30)
HCT VFR BLD AUTO: 45.1 % (ref 36.5–49.3)
HGB BLD-MCNC: 14.7 G/DL (ref 12–17)
LYMPHOCYTES # BLD AUTO: 1.04 THOUSANDS/ΜL (ref 0.6–4.47)
LYMPHOCYTES NFR BLD AUTO: 11 % (ref 14–44)
MCH RBC QN AUTO: 28.2 PG (ref 26.8–34.3)
MCHC RBC AUTO-ENTMCNC: 32.6 G/DL (ref 31.4–37.4)
MCV RBC AUTO: 87 FL (ref 82–98)
METHGB MFR BLDCO: 0.3 % (ref 0.1–1.5)
MONOCYTES # BLD AUTO: 0.22 THOUSAND/ΜL (ref 0.17–1.22)
MONOCYTES NFR BLD AUTO: 2 % (ref 4–12)
NEUTROPHILS # BLD AUTO: 7.82 THOUSANDS/ΜL (ref 1.85–7.62)
NEUTS SEG NFR BLD AUTO: 85 % (ref 43–75)
NRBC BLD AUTO-RTO: 0 /100 WBCS
O2 CT BLDV-SCNC: 18.3 ML/DL
P AXIS: 21 DEGREES
PCO2 BLDV: 41.3 MM HG (ref 42–50)
PH BLDV: 7.4 [PH] (ref 7.3–7.4)
PLATELET # BLD AUTO: 282 THOUSANDS/UL (ref 149–390)
PMV BLD AUTO: 8.9 FL (ref 8.9–12.7)
PO2 BLDV: 51.5 MM HG (ref 35–45)
POTASSIUM SERPL-SCNC: 3.9 MMOL/L (ref 3.5–5.3)
PR INTERVAL: 146 MS
PROT SERPL-MCNC: 8.6 G/DL (ref 6.4–8.2)
QRS AXIS: 36 DEGREES
QRSD INTERVAL: 86 MS
QT INTERVAL: 346 MS
QTC INTERVAL: 480 MS
RBC # BLD AUTO: 5.21 MILLION/UL (ref 3.88–5.62)
SALICYLATES SERPL-MCNC: <3 MG/DL (ref 3–20)
SODIUM SERPL-SCNC: 144 MMOL/L (ref 136–145)
T WAVE AXIS: 15 DEGREES
VENTRICULAR RATE: 116 BPM
WBC # BLD AUTO: 9.24 THOUSAND/UL (ref 4.31–10.16)

## 2017-10-31 PROCEDURE — 71020 HB CHEST X-RAY 2VW FRONTAL&LATL: CPT

## 2017-10-31 PROCEDURE — 93005 ELECTROCARDIOGRAM TRACING: CPT

## 2017-10-31 PROCEDURE — 82375 ASSAY CARBOXYHB QUANT: CPT | Performed by: EMERGENCY MEDICINE

## 2017-10-31 PROCEDURE — 76705 ECHO EXAM OF ABDOMEN: CPT

## 2017-10-31 PROCEDURE — G0480 DRUG TEST DEF 1-7 CLASSES: HCPCS | Performed by: EMERGENCY MEDICINE

## 2017-10-31 PROCEDURE — 36415 COLL VENOUS BLD VENIPUNCTURE: CPT | Performed by: EMERGENCY MEDICINE

## 2017-10-31 PROCEDURE — 80329 ANALGESICS NON-OPIOID 1 OR 2: CPT | Performed by: EMERGENCY MEDICINE

## 2017-10-31 PROCEDURE — 80053 COMPREHEN METABOLIC PANEL: CPT | Performed by: EMERGENCY MEDICINE

## 2017-10-31 PROCEDURE — 85025 COMPLETE CBC W/AUTO DIFF WBC: CPT | Performed by: EMERGENCY MEDICINE

## 2017-10-31 PROCEDURE — 83050 HGB METHEMOGLOBIN QUAN: CPT | Performed by: EMERGENCY MEDICINE

## 2017-10-31 PROCEDURE — 80074 ACUTE HEPATITIS PANEL: CPT | Performed by: EMERGENCY MEDICINE

## 2017-10-31 PROCEDURE — 82805 BLOOD GASES W/O2 SATURATION: CPT | Performed by: EMERGENCY MEDICINE

## 2017-10-31 RX ORDER — BENZTROPINE MESYLATE 1 MG/ML
1 INJECTION INTRAMUSCULAR; INTRAVENOUS EVERY 6 HOURS PRN
Status: CANCELLED | OUTPATIENT
Start: 2017-10-31

## 2017-10-31 RX ORDER — RISPERIDONE 1 MG/1
1 TABLET, ORALLY DISINTEGRATING ORAL
Status: CANCELLED | OUTPATIENT
Start: 2017-10-31

## 2017-10-31 RX ORDER — HALOPERIDOL 5 MG
5 TABLET ORAL EVERY 6 HOURS PRN
Status: CANCELLED | OUTPATIENT
Start: 2017-10-31

## 2017-10-31 RX ORDER — MAGNESIUM HYDROXIDE/ALUMINUM HYDROXICE/SIMETHICONE 120; 1200; 1200 MG/30ML; MG/30ML; MG/30ML
30 SUSPENSION ORAL EVERY 4 HOURS PRN
Status: CANCELLED | OUTPATIENT
Start: 2017-10-31

## 2017-10-31 RX ORDER — LORAZEPAM 1 MG/1
1 TABLET ORAL EVERY 6 HOURS PRN
Status: CANCELLED | OUTPATIENT
Start: 2017-10-31

## 2017-10-31 RX ORDER — TRAZODONE HYDROCHLORIDE 50 MG/1
50 TABLET ORAL
Status: CANCELLED | OUTPATIENT
Start: 2017-10-31

## 2017-10-31 RX ORDER — BENZTROPINE MESYLATE 1 MG/1
1 TABLET ORAL EVERY 6 HOURS PRN
Status: CANCELLED | OUTPATIENT
Start: 2017-10-31

## 2017-10-31 RX ORDER — OLANZAPINE 10 MG/1
10 INJECTION, POWDER, LYOPHILIZED, FOR SOLUTION INTRAMUSCULAR 2 TIMES DAILY PRN
Status: CANCELLED | OUTPATIENT
Start: 2017-10-31

## 2017-10-31 RX ORDER — ACETAMINOPHEN 325 MG/1
650 TABLET ORAL EVERY 6 HOURS PRN
Status: CANCELLED | OUTPATIENT
Start: 2017-10-31

## 2017-10-31 RX ADMIN — SODIUM CHLORIDE 1000 ML: 0.9 INJECTION, SOLUTION INTRAVENOUS at 14:42

## 2017-10-31 NOTE — LETTER
[address]  [facility] [address]  Dept: [phone]            EMTALA TRANSFER CONSENT    NAME Yousif Dobbins                                        [place] 1980                              MRN [id number]    I have been informed of my rights regarding examination, treatment, and transfer   by Dr Grace Ordonez DO    Benefits: Continuity of care    Risks: Potential for delay in receiving treatment      { ED EMTALA TRANSFER CHOICES:[id number]}    I authorize the performance of emergency medical procedures and treatments upon me in both transit and upon arrival at the receiving facility  Additionally, I authorize the release of any and all medical records to the receiving facility and request they be transported with me, if possible  I understand that the safest mode of transportation during a medical emergency is an ambulance and that the Hospital advocates the use of this mode of transport  Risks of traveling to the receiving facility by car, including absence of medical control, life sustaining equipment, such as oxygen, and medical personnel has been explained to me and I fully understand them  (MATEO CORRECT BOX BELOW)  [ X ]  I consent to the stated transfer and to be transported by ambulance/helicopter  [  ]  I consent to the stated transfer, but refuse transportation by ambulance and accept full responsibility for my transportation by car    I understand the risks of non-ambulance transfers and I exonerate the Hospital and its staff from any deterioration in my condition that results from this refusal     X___________________________________________    DATE  10/31/17  TIME_18:23_______  Signature of patient or legally responsible individual signing on patient behalf           RELATIONSHIP TO PATIENT_________________________                                Provider Certification    NAME Yousifbraydon Hollowaychristian TORRE 1980                              MRN [id number]    A medical screening exam was performed on the above named patient  Based on the examination:    Condition Necessitating Transfer Depression/Anxiety    Patient Condition: The patient has been stabilized such that within reasonable medical probability, no material deterioration of the patient condition or the condition of the unborn child(jaime) is likely to result from the transfer    Reason for Transfer: Level of Care needed not available at this facility    Transfer Requirements: Facility [place] [address]   · Space available and qualified personnel available for treatment as acknowledged by    · Agreed to accept transfer and to provide appropriate medical treatment as acknowledged by       Dr Dillan Gomes  · Appropriate medical records of the examination and treatment of the patient are provided at the time of transfer   [address] [facility] _______  · Transfer will be performed by qualified personnel from [facility]  and appropriate transfer equipment as required, including the use of necessary and appropriate life support measures      Provider Certification: I have examined the patient and explained the following risks and benefits of being transferred/refusing transfer to the patient/family:  General risk, such as traffic hazards, adverse weather conditions, rough terrain or turbulence, possible failure of equipment (including vehicle or aircraft), or consequences of actions of persons outside the control of the transport personnel      Based on these reasonable risks and benefits to the patient and/or the unborn child(jaime), and based upon the information available at the time of the patients examination, I certify that the medical benefits reasonably to be expected from the provision of appropriate medical treatments at another medical facility outweigh the increasing risks, if any, to the individuals medical condition, and in the case of labor to the unborn child, from effecting the transfer      X____________________________________________ DATE 10/31/17        TIME_______      ORIGINAL - SEND TO MEDICAL RECORDS   COPY - SEND WITH PATIENT DURING TRANSFER

## 2017-10-31 NOTE — ED PROVIDER NOTES
History  Chief Complaint   Patient presents with    Toxic Inhalation     Pt c/o dizziness after huffing 3 cans of dust off computer   Per patient usually huffs once a week this time was more than normal amount  Some SOB  HPI     42 yoM not taking his abilify and celexa presents with palpitations and light-headedness and some SOB since he's inhaled a full 3 cans of "Dust Off" keyboard  which contains hydrocarbons  He usually inhales once per week, this is more than usual   He's having more depression than usual due to work and wife stressors, and not using his meds  Adamantly denies multiple times any SI/HI/Hallucinations  No pain  Remote h/o SI, but no attempts  No other complaints  MDM:  Imp: intentional inhalant substance abuse with tachycardia and the potential for metabolic derangements or pneumonitis  No particular reason for 302 at this time as he's using for fun and increased depression  He needs some labs and cxr and observation  Crisis to see to help get him directed towards a psychiatrist he may find preferable  Prior to Admission Medications   Prescriptions Last Dose Informant Patient Reported? Taking?    ARIPiprazole (ABILIFY) 10 mg tablet   Yes No   Sig: Take 10 mg by mouth daily after dinner     citalopram (CeleXA) 10 mg tablet   Yes No   Sig: Take 10 mg by mouth daily   levothyroxine 175 mcg tablet   Yes No   Sig: Take 175 mcg by mouth daily      Facility-Administered Medications: None       Past Medical History:   Diagnosis Date    Cancer ([facility] [address] )     Thyroid Cancer    Depression     Disease of thyroid gland     Inhalation of noxious substance     Psychiatric disorder        Past Surgical History:   Procedure Laterality Date    ARTHROSCOPY KNEE Right     SC THYROIDECTOMY N/A 1/23/2017    Procedure: TOTAL THYROIDECTOMY;  Surgeon: Nori Tafoya MD;  Location: [facility];  Service: Surgical Oncology       Family History   Problem Relation Age of Onset    Diabetes Mother     Cancer Father      I have reviewed and agree with the history as documented  Social History   Substance Use Topics    Smoking status: Current Every Day Smoker    Smokeless tobacco: Never Used      Comment: quit in January    Alcohol use No        Review of Systems   Constitutional: Negative  Negative for appetite change, diaphoresis, fatigue and fever  HENT: Negative for congestion, dental problem, drooling, ear discharge, ear pain, postnasal drip, rhinorrhea, sore throat, trouble swallowing and voice change  Eyes: Negative for photophobia, pain, discharge, redness and visual disturbance  Respiratory: Positive for shortness of breath  Negative for cough, choking, chest tightness, wheezing and stridor  Cardiovascular: Positive for palpitations  Negative for chest pain and leg swelling  Gastrointestinal: Negative for abdominal pain, blood in stool, constipation, diarrhea, nausea and vomiting  Endocrine: Negative  Genitourinary: Negative  Musculoskeletal: Negative  Skin: Negative  Allergic/Immunologic: Negative  Neurological: Positive for light-headedness  Hematological: Negative  Psychiatric/Behavioral: Negative  Physical Exam  ED Triage Vitals   Temperature Pulse Respirations Blood Pressure SpO2   10/31/17 1437 10/31/17 1417 10/31/17 1417 10/31/17 1417 10/31/17 1417   98 3 °F (36 8 °C) (!) 116 20 141/97 97 %      Temp Source Heart Rate Source Patient Position - Orthostatic VS BP Location FiO2 (%)   10/31/17 1437 10/31/17 1417 10/31/17 1417 10/31/17 1417 --   Oral Monitor Lying Right arm       Pain Score       10/31/17 1417       No Pain           Orthostatic Vital Signs  Vitals:    10/31/17 1815 10/31/17 1830 10/31/17 1900 10/31/17 2149   BP:  135/85 134/82 131/81   Pulse: 101 85 98 92   Patient Position - Orthostatic VS:    Sitting       Physical Exam   Constitutional: He is oriented to person, place, and time  He appears well-developed and well-nourished  No distress  HENT:   Head: Normocephalic and atraumatic  Nose: Nose normal    Mouth/Throat: Oropharynx is clear and moist    Eyes: Conjunctivae and EOM are normal  Pupils are equal, round, and reactive to light  Neck: Normal range of motion  Neck supple  No thyromegaly present  Cardiovascular: Regular rhythm, normal heart sounds and intact distal pulses  Exam reveals no gallop and no friction rub  No murmur heard  tahcy   Pulmonary/Chest: Effort normal and breath sounds normal  No stridor  No respiratory distress  He has no wheezes  He has no rales  He exhibits no tenderness  Abdominal: Soft  Bowel sounds are normal  There is no tenderness  There is no guarding  Musculoskeletal: Normal range of motion  He exhibits no deformity  Neurological: He is alert and oriented to person, place, and time  He exhibits normal muscle tone  Skin: Skin is warm and dry  Psychiatric:   Flat affect, avoidant eye contact, depressive mood  Exhibits rational thinking  Vitals reviewed  ED Medications  Medications   sodium chloride 0 9 % bolus 1,000 mL (0 mL Intravenous Stopped 10/31/17 1921)       Diagnostic Studies  Results Reviewed     Procedure Component Value Units Date/Time    Acetaminophen level [[id number]]  (Abnormal) Collected:  10/31/17 1606    Lab Status:  Final result Specimen:  Blood from Arm, Right Updated:  10/31/17 1637     Acetaminophen Level <2 0 (L) ug/mL     Salicylate level [[id number]]  (Abnormal) Collected:  10/31/17 1606    Lab Status:  Final result Specimen:  Blood from Arm, Right Updated:  28/45/61 0925     Salicylate Lvl <3 (L) mg/dL     Hepatitis panel, acute [[id number]] Collected:  10/31/17 1615    Lab Status:   In process Specimen:  Blood Updated:  10/31/17 1615    Comprehensive metabolic panel [[id number]]  (Abnormal) Collected:  10/31/17 1440    Lab Status:  Final result Specimen:  Blood from Arm, Right Updated:  10/31/17 1507     Sodium 144 mmol/L      Potassium 3 9 mmol/L Chloride 103 mmol/L      CO2 28 mmol/L      Anion Gap 13 mmol/L      BUN 15 mg/dL      Creatinine 0 94 mg/dL      Glucose 142 (H) mg/dL      Calcium 9 1 mg/dL      AST 57 (H) U/L       (H) U/L      Alkaline Phosphatase 171 (H) U/L      Total Protein 8 6 (H) g/dL      Albumin 4 7 g/dL      Total Bilirubin 0 40 mg/dL      eGFR 102 ml/min/1 73sq m     Narrative:         National Kidney Disease Education Program recommendations are as follows:  GFR calculation is accurate only with a steady state creatinine  Chronic Kidney disease less than 60 ml/min/1 73 sq  meters  Kidney failure less than 15 ml/min/1 73 sq  meters      Carboxyhemoglobin [[id number]]  (Abnormal) Collected:  10/31/17 1440    Lab Status:  Final result Specimen:  Blood from Arm, Right Updated:  10/31/17 1500     Carbon Monoxide, Blood 1 9 (H) %     Narrative:         Normal Carboxyhemoglobin range for nonsmokers is <1 5%   Normal Carboxyhemoglobin range for smokers is 1 5% to 5 1%     Methemoglobin [[id number]]  (Normal) Collected:  10/31/17 1440    Lab Status:  Final result Specimen:  Blood from Arm, Right Updated:  10/31/17 1457     Methemoglobin 0 3 %     CBC and differential [[id number]]  (Abnormal) Collected:  10/31/17 1440    Lab Status:  Final result Specimen:  Blood from Arm, Right Updated:  10/31/17 1452     WBC 9 24 Thousand/uL      RBC 5 21 Million/uL      Hemoglobin 14 7 g/dL      Hematocrit 45 1 %      MCV 87 fL      MCH 28 2 pg      MCHC 32 6 g/dL      RDW 13 0 %      MPV 8 9 fL      Platelets 486 Thousands/uL      nRBC 0 /100 WBCs      Neutrophils Relative 85 (H) %      Lymphocytes Relative 11 (L) %      Monocytes Relative 2 (L) %      Eosinophils Relative 0 %      Basophils Relative 0 %      Neutrophils Absolute 7 82 (H) Thousands/µL      Lymphocytes Absolute 1 04 Thousands/µL      Monocytes Absolute 0 22 Thousand/µL      Eosinophils Absolute 0 03 Thousand/µL      Basophils Absolute 0 04 Thousands/µL     Blood gas, venous [[id number]] (Abnormal) Collected:  10/31/17 1440    Lab Status:  Final result Specimen:  Blood from Arm, Right Updated:  10/31/17 1449     pH, Julian 7 404 (H)     pCO2, Julian 41 3 (L) mm Hg      pO2, Julian 51 5 (H) mm Hg      HCO3, Julian 25 2 mmol/L      Base Excess, Julian 0 4 mmol/L      O2 Content, Julian 18 3 ml/dL      O2 HGB, VENOUS 84 9 (H) %                  US right upper quadrant   Final Result by Gracie Frausto MD (10/31 4417)   1  Mild hepatic steatosis  2  Gallbladder hypoechoic subcentimeter structures within the wall or attached to the wall, largest measuring 4 mm could represent gallbladder polyps and/or adenomyomatosis  Workstation performed: [id number]         XR chest pa & lateral   ED Interpretation by Keyon Mullen DO (10/31 5223)   normal      Final Result by Donita Lopez MD (10/31 8724)      No active pulmonary disease  Workstation performed: [id number]                    Procedures  ECG 12 Lead Documentation  Date/Time: 10/31/2017 2:29 PM  Performed by: Marla Adams  Authorized by: KEYON Johnson     Rate:     ECG rate:  116    ECG rate assessment: tachycardic    Rhythm:     Rhythm: sinus tachycardia    Ectopy:     Ectopy: none    QRS:     QRS axis:  Normal    QRS intervals:  Normal  Conduction:     Conduction: normal    ST segments:     ST segments:  Normal  T waves:     T waves: normal             Phone Contacts  ED Phone Contact    ED Course  ED Course as of Nov 01 0716   Tue Oct 31, 2017   1556 Marginal transaminitis noticed today  Added on additional labs and RUQ US  He is asymptomatic with these findings  He still denies apap ingestion or intentional self-harm  If additional w/u is reassuring, he'll need GI as outpatient and can still go to [facility] on the 201 he has signed at this time      [address] acceptance for psych transfer                                MDM  CritCare Time    Disposition  Final diagnoses:   Depression   Ingestion of hydrocarbon     Time reflects when diagnosis was documented in both MDM as applicable and the Disposition within this note     Time User Action Codes Description Comment    11/1/2017  7:15 AM Paz, Case Add [F32 9] Depression     11/1/2017  7:16 AM Paz, Case Add [T65 [id number]] Ingestion of hydrocarbon       ED Disposition     ED Disposition Condition Comment    Transfer to Another [address] should be transferred out to [address] Most Recent Value   Patient Condition  The patient has been stabilized such that within reasonable medical probability, no material deterioration of the patient condition or the condition of the unborn child(jaime) is likely to result from the transfer   Reason for Transfer  Level of Care needed not available at this facility   Benefits of Transfer  Continuity of care   Risks of Transfer  Potential for delay in receiving treatment   Accepting Physician  Dr Lupillo Pinto Name, [address] by (Company and Unit #)  [facility]   Sending MD Dr Josselin Ramos   Provider Certification  General risk, such as traffic hazards, adverse weather conditions, rough terrain or turbulence, possible failure of equipment (including vehicle or aircraft), or consequences of actions of persons outside the control of the transport personnel      RN Documentation    Flowsheet Row Most [address] [facility] Name, [address]   [id number]   Report Given to  RN christina   Transport Mode  Ambulance   Transported by [employer] and Unit #)  [facility]   Level of Care  Basic life support   Patient Belongings Disposition  Sent with patient   Transfer Date  10/31/17   Transfer Time  2330      Follow-up Information    None       Discharge Medication List as of 11/1/2017 12:24 AM      CONTINUE these medications which have NOT CHANGED    Details   ARIPiprazole (ABILIFY) 10 mg tablet Take 10 mg by mouth daily after dinner  , Until Discontinued, Historical Med citalopram (CeleXA) 10 mg tablet Take 10 mg by mouth daily, Historical Med      levothyroxine 175 mcg tablet Take 175 mcg by mouth daily, Until Discontinued, Historical Med           No discharge procedures on file      ED Provider  Electronically Signed by           Keyon Ho DO  11/01/17 6318

## 2017-10-31 NOTE — ED NOTES
Pt has been accepted to [facility] by Nurse Jose De Jesus Watson and Dr Dillan Gomes       Report needs to be call to [facility] at [phone]

## 2017-10-31 NOTE — LETTER
Section I - General Information    Name of Patient: Michael Farias                 : 1980    Medicare #:____________________  Transport Date: 10/31/17 (PCS is valid for round trips on this date and for all repetitive trips in the 60-day range as noted below )  Origin: [address]                                                         Destination:____[place] [address]____________________________________________  Is the pt's stay covered under Medicare Part A (PPS/DRG)     (_) YES  (_X) NO  Closest appropriate facility? (_X) YES  (_) NO  If no, why is transport to more distant facility required?________________________  If hosp-hosp transfer, describe services needed at 2nd facility not available at 1st facility? ___BH IP___________________________  If hospice pt, is this transport related to pt's terminal illness? (_) YES (_) NO Describe____________________________________    Section II - Medical Necessity Questionnaire  Ambulance transportation is medically necessary only if other means of transport are contraindicated or would be potentially harmful to the patient  To meet this requirement, the patient must either be "bed confined" or suffer from a condition such that transport by means other than ambulance is contraindicated by the patient's condition   The following questions must be answered by the medical professional signing below for this form to be valid:    1)  Describe the MEDICAL CONDITION (physical and/or mental) of this patient AT [address] [place] [address] that requires the patient to be transported in an ambulance and why transport by other means is contraindicated by the patient's condition:________________Depression/Anxiety__________________________________________________________    2) Is the patient "bed confined" as defined below?     (_) YES  (X_) NO  To be "be confined" the patient must satisfy all three of the following conditions: (1) unable to get up from bed without Assistance; AND (2) unable to ambulate; AND (3) unable to sit in a chair or wheelchair  3) Can this patient safely be transported by car or wheelchair [facility] (i e , seated during transport without a medical attendant or monitoring)?   (_) YES  (_X) NO    4) In addition to completing questions 1-3 above, please check any of the following conditions that apply*:  *Note: supporting documentation for any boxes checked must be maintained in the patient's medical records  (_)Contractures   (_)Non-Healed Fractures  (_)Patient is confused (_)Patient is comatose (_)Moderate/severe pain on movement (_)Danger to self/others  (_)IV meds/fluids required (_)Patient is combative(_)Need or possible need for restraints (_)DVT requires elevation of lower extremity  (_)Medical attendant required (_)Requires oxygen-unable to self administer (_)Special handling/isolation/infection control precautions required (_)Unable to tolerate seated position for time needed to transport (_)Hemodynamic monitoring required en route (_)Unable to sit in a chair or wheelchair due to decubitus ulcers or other wounds (_)Cardiac monitoring required en route (_)Morbid obesity requires additional personnel/equipment to safely handle patient (_)Orthopedic device (backboard, halo, pins, traction, brace, wedge, etc,) requiring special handling during transport (_X)Other(specify)____Depression/Anxiety___________________________________________    Section III - Signature of Physician or Healthcare Professional  I certify that the above information is true and correct based on my evaluation of this patient, and represent that the patient requires transport by ambulance and that other forms of transport are contraindicated   I understand that this information will be used by the Centers for Medicare and Medicaid Services (CMS) to support the determination of medical necessity for ambulance services, and I represent that I have personal knowledge of the patient's condition at time of transport  (_) If this box is checked, I also certify that the patient is physically or mentally incapable of signing the ambulance service's claim and that the institution with which I am affiliated has furnished care, services, or assistance to the patient  My signature below is made on behalf of the patient pursuant to 42 CFR §424 36(b)(4)  In accordance with 42 CFR §424 37, the specific reason(s) that the patient is physically or mentally incapable of signing the claim form is as follows: _________________________________________________________________________________________________________      Signature of Physician* or Healthcare Professional______________________________________________________________  Signature Date 10/31/17 (For scheduled repetitive transports, this form is not valid for transports performed more than 60 days after this date)    Printed Name & Credentials of Physician or Healthcare Professional (MD, DO, RN, etc )__A  CHANDRA Saenz_______________  *Form must be signed by patient's attending physician for scheduled, repetitive transports   For non-repetitive, unscheduled ambulance transports, if unable to obtain the signature of the attending physician, any of the following may sign (choose appropriate option below)  (_) Physician Assistant (_)  Clinical Nurse Specialist (_)  Registered Nurse  (_)  Nurse Practitioner  Regis Kumar) Discharge Planner

## 2017-10-31 NOTE — ED NOTES
Spoke with lab - they state that they will use their existing specimen to run Hepatitis panel        Denisse Last RN  10/31/17 9663

## 2017-10-31 NOTE — ED NOTES
CW spoke with Georgina Acuña who agreed to  pt from ED 22 at 23:30 and transport him to \[facility]\"" [facility]

## 2017-10-31 NOTE — ED NOTES
Pt presented to the ER due to increased depression/anxiety and has been using inhalents on a daily basis  Pt states that he has been using inhalents for approximately 3-4 months  Pt admits to being hospitalized in the past on 3 occassions at [facility], last being in the summer of 2017  Pt states that he has not followed up with a psychiatrist/therapist and has not been taking medications because he "doesn't like how they make him feel"  Pt stated that he is currently in remission from thyroid cancer, which he was dx with in January  Pt has a pending DUI charge  Pt admits to increased depression over his job/work and home life which is struggling due to the inhalent use  Pt admits to [facility] when using, stating he "sees things that aren't there"  Pt denies SI/HI at this time, but stated that he's become very isolative and has lost interest in several things he used to enjoy doing  Pt admits to using 8 canisters of inhalents per day  Discussed options with the pt, who is interested in inpt  tx at this time, but wishes to discuss it with his wife  Pt is not eligible at this time for crisis res as he has used recently       Abraham Ahuja, MSW  10/31/2017  1530

## 2017-10-31 NOTE — ED NOTES
201 faxed to [address] [facility] [facility] [address] for consideration       CALEB Ge  10/31/2017  7570

## 2017-10-31 NOTE — ED NOTES
CW spoke with Iliana Campa at [phone] #[phone] who approved up to 21 days of [facility] IP Tx; Auth # [id number]  Her fax # is [phone] where D/C paperwork is to be faxed to her

## 2017-11-01 ENCOUNTER — HOSPITAL ENCOUNTER (INPATIENT)
Facility: HOSPITAL | Age: 37
LOS: 6 days | Discharge: HOME | DRG: 885 | End: 2017-11-07
Attending: PSYCHIATRY & NEUROLOGY | Admitting: PSYCHIATRY & NEUROLOGY
Payer: COMMERCIAL

## 2017-11-01 DIAGNOSIS — R52 PAIN: ICD-10-CM

## 2017-11-01 DIAGNOSIS — F19.96: ICD-10-CM

## 2017-11-01 DIAGNOSIS — F17.200 NICOTINE DEPENDENCE: ICD-10-CM

## 2017-11-01 DIAGNOSIS — F33.2 SEVERE EPISODE OF RECURRENT MAJOR DEPRESSIVE DISORDER, WITHOUT PSYCHOTIC FEATURES (HCC): Primary | ICD-10-CM

## 2017-11-01 DIAGNOSIS — G47.00 INSOMNIA, UNSPECIFIED TYPE: ICD-10-CM

## 2017-11-01 DIAGNOSIS — E03.9 HYPOTHYROIDISM, UNSPECIFIED TYPE: ICD-10-CM

## 2017-11-01 PROBLEM — F19.982 DRUG INDUCED INSOMNIA (HCC): Status: ACTIVE | Noted: 2017-11-01

## 2017-11-01 PROBLEM — F19.10 SUBSTANCE ABUSE (HCC): Status: ACTIVE | Noted: 2017-11-01

## 2017-11-01 LAB
HAV IGM SER QL: NORMAL
HBV CORE IGM SER QL: NORMAL
HBV SURFACE AG SER QL: NORMAL
HCV AB SER QL: NORMAL
TSH SERPL DL<=0.05 MIU/L-ACNC: 2.13 UIU/ML (ref 0.36–3.74)

## 2017-11-01 PROCEDURE — 84443 ASSAY THYROID STIM HORMONE: CPT | Performed by: PHYSICIAN ASSISTANT

## 2017-11-01 PROCEDURE — 99285 EMERGENCY DEPT VISIT HI MDM: CPT

## 2017-11-01 RX ORDER — ARIPIPRAZOLE 10 MG/1
10 TABLET ORAL
Status: DISCONTINUED | OUTPATIENT
Start: 2017-11-01 | End: 2017-11-02

## 2017-11-01 RX ORDER — ECHINACEA PURPUREA EXTRACT 125 MG
1 TABLET ORAL EVERY 2 HOUR PRN
Status: DISCONTINUED | OUTPATIENT
Start: 2017-11-01 | End: 2017-11-07 | Stop reason: HOSPADM

## 2017-11-01 RX ORDER — BENZTROPINE MESYLATE 1 MG/1
1 TABLET ORAL EVERY 6 HOURS PRN
Status: DISCONTINUED | OUTPATIENT
Start: 2017-11-01 | End: 2017-11-07 | Stop reason: HOSPADM

## 2017-11-01 RX ORDER — HALOPERIDOL 5 MG
5 TABLET ORAL EVERY 6 HOURS PRN
Status: DISCONTINUED | OUTPATIENT
Start: 2017-11-01 | End: 2017-11-07 | Stop reason: HOSPADM

## 2017-11-01 RX ORDER — ACETAMINOPHEN 325 MG/1
650 TABLET ORAL EVERY 6 HOURS PRN
Status: DISCONTINUED | OUTPATIENT
Start: 2017-11-01 | End: 2017-11-07 | Stop reason: HOSPADM

## 2017-11-01 RX ORDER — MAGNESIUM HYDROXIDE/ALUMINUM HYDROXICE/SIMETHICONE 120; 1200; 1200 MG/30ML; MG/30ML; MG/30ML
30 SUSPENSION ORAL EVERY 4 HOURS PRN
Status: DISCONTINUED | OUTPATIENT
Start: 2017-11-01 | End: 2017-11-07 | Stop reason: HOSPADM

## 2017-11-01 RX ORDER — BENZTROPINE MESYLATE 1 MG/ML
1 INJECTION INTRAMUSCULAR; INTRAVENOUS EVERY 6 HOURS PRN
Status: DISCONTINUED | OUTPATIENT
Start: 2017-11-01 | End: 2017-11-07 | Stop reason: HOSPADM

## 2017-11-01 RX ORDER — MIRTAZAPINE 15 MG/1
15 TABLET, FILM COATED ORAL
Status: DISCONTINUED | OUTPATIENT
Start: 2017-11-01 | End: 2017-11-07 | Stop reason: HOSPADM

## 2017-11-01 RX ORDER — LORAZEPAM 1 MG/1
1 TABLET ORAL EVERY 6 HOURS PRN
Status: DISCONTINUED | OUTPATIENT
Start: 2017-11-01 | End: 2017-11-07 | Stop reason: HOSPADM

## 2017-11-01 RX ORDER — GABAPENTIN 100 MG/1
100 CAPSULE ORAL 3 TIMES DAILY
Status: DISCONTINUED | OUTPATIENT
Start: 2017-11-01 | End: 2017-11-05

## 2017-11-01 RX ORDER — TRAZODONE HYDROCHLORIDE 50 MG/1
50 TABLET ORAL
Status: DISCONTINUED | OUTPATIENT
Start: 2017-11-01 | End: 2017-11-07 | Stop reason: HOSPADM

## 2017-11-01 RX ORDER — RISPERIDONE 1 MG/1
1 TABLET, ORALLY DISINTEGRATING ORAL
Status: DISCONTINUED | OUTPATIENT
Start: 2017-11-01 | End: 2017-11-07 | Stop reason: HOSPADM

## 2017-11-01 RX ORDER — BUPROPION HYDROCHLORIDE 100 MG/1
100 TABLET, EXTENDED RELEASE ORAL DAILY
Status: DISCONTINUED | OUTPATIENT
Start: 2017-11-02 | End: 2017-11-02

## 2017-11-01 RX ORDER — IBUPROFEN 600 MG/1
600 TABLET ORAL EVERY 6 HOURS PRN
Status: DISCONTINUED | OUTPATIENT
Start: 2017-11-01 | End: 2017-11-07 | Stop reason: HOSPADM

## 2017-11-01 RX ORDER — OLANZAPINE 10 MG/1
10 INJECTION, POWDER, LYOPHILIZED, FOR SOLUTION INTRAMUSCULAR 2 TIMES DAILY PRN
Status: DISCONTINUED | OUTPATIENT
Start: 2017-11-01 | End: 2017-11-07 | Stop reason: HOSPADM

## 2017-11-01 RX ADMIN — GABAPENTIN 100 MG: 100 CAPSULE ORAL at 18:35

## 2017-11-01 RX ADMIN — TRAZODONE HYDROCHLORIDE 50 MG: 50 TABLET ORAL at 21:14

## 2017-11-01 RX ADMIN — ARIPIPRAZOLE 10 MG: 10 TABLET ORAL at 18:35

## 2017-11-01 RX ADMIN — GABAPENTIN 100 MG: 100 CAPSULE ORAL at 21:16

## 2017-11-01 RX ADMIN — MIRTAZAPINE 15 MG: 15 TABLET, FILM COATED ORAL at 21:14

## 2017-11-01 NOTE — CASE MANAGEMENT
CM met with patient  Patient was calm, cooperative, drowsy through much of session  Able to maintain eye contact yet did appear disheveled and dysthymic at this time  Reports current financial stressors and DUI pending as main struggles and denies active supports in the community for mental health or substance abuse  Denies that inhalant use/abuse is a "real issue" yet does agree to attend NA for this  Wishes to be connected with outpatient mental health services as he has not followed up from his previous IP admission at [facility] (most recent being 7/17)  DUI pending court case date is 11/20 and 12/4  Reports living with significant other and 2 children (ages 9 and 6)  Denies current HI, psychosis  Reports fleeting SI with increased depression and lack of sleep appetite for about two months  Signed treatment plan and ROIs for PCP, fiance, and OP mental health treatment  CM will continue to monitor

## 2017-11-01 NOTE — PROGRESS NOTES
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

## 2017-11-01 NOTE — ED NOTES
Patient changed into paper scrubs and IV discontinued to prepare for transport to [facility]       Denisse Last RN  10/31/17 2018

## 2017-11-01 NOTE — PROGRESS NOTES
Pt  Is a 201 from the [facility]  Pt presented to the ED with dizziness after huffing several cans of keyboard  and increased depression  Pt started using inhalants roughly 4 months ago due to stress from work and home life (lives with finorma and 2 children)  Pt started by using inhalant about once a week but recently has been using daily, he denies any other drug or alcohol use  Pt has a history of depression with 3 previous psych admits at [facility], most recently in summer of 2017  Pt states that he was prescribed Abilify and Celexa but has not been taking his meds "because I don't like the way they make me feel " Pt has a history of thyroid cancer, which was removed in January of 2017  At admission pt is calm and cooperative, but appears depressed  He denies SI/HI and AH/VH

## 2017-11-01 NOTE — DISCHARGE INSTR - OTHER ORDERS
[facility]  Radha Branden René [address] [facility], [facility] [place]bi Beltran  [phone]    Erinn Mixon  [address] [facility] [address]  [place] [address] [place]ynn [place]tran  [phone]    Please go to your follow up appointment on 11/10/17 at 1:45pm     [address]  [address] [place] [address] [place]tr[place]  [phone]    Please go to the above address on Monday, 11/13/17 at 10:30am for your intake appointment  Please bring your insurance card, photo ID, and list of medications to this appointment

## 2017-11-01 NOTE — H&P
Psychiatric Evaluation - Behavioral Health     Identification Data:Gordon Smith 42 y o  male MRN: [id number]  Unit/Bed#: FO7 764-01 Encounter: [id number]    Chief Complaint: "I thought I was losing my mind", "I don't want to go on living like this" and depression    History of Present Illness     Joey Tse is a 42 y o  male with a history of depression versus bipolar disorder who was admitted to the inpatient psychiatric unit on a voluntary 201 commitment basis due to depression, anxiety, inability to care for self, inability to care for self because of mental illness and suicidal ideation  Symptoms prior to admission included worsening depression, depression, feeling depressed, suicidal ideation, hopelessness, helplessness, poor concentration, poor appetite, weight loss and difficulty sleeping  Onset of symptoms was gradual starting few weeks ago with gradually worsening course since that time  Stressors preceding admission included drug use problems  On initial evaluation after admission to the inpatient psychiatric unit the patient was tense and guarded, depressed and anxious     He stated that he stopped taking his medications Abilify and Celexa because he felt that " I feel like I have no personaluty on Celexa and Abilify"  He specifically mentioned that this medications make his emotions shallow  Lack of motivation, ost in tstess, hard to converation  However after he stopped taking his medications his depression slowly getting worse and he relapsed on inhaler  stating that he gets some wait to get out of reality quote  He endorsed some visual hallucinations while intoxicated but denied paranoid delusions or auditory hallucinations  The patient 6 stressors years ago the patient started to dated the same girlfriend he has was here now ago police because the patient relapsed and started to abuse inhalers     The patient stated that his depression worsened after he is thyroid gland was removed because of cancer  The patient stated that his doctors stated that he was cancer free     His previous medications were risperidone, Prozac, and Seroquel, the patient was not sure if those medication were working  Admitted to [facility] and was treated for about 5 days  Attempts Denied history of suicidal    Recently he developed insomnia was inability to fall and stay asleep  Psychiatric Review Of Systems:    sleep changes: decreased  appetite changes: decreased  weight changes: no  energy/anergy: decreased  interest/pleasure/anhedonia: decreased  somatic symptoms: yes  anxiety/panic: yes  tony: past mixed episodes  guilty/hopeless: yes  self injurious behavior/risky behavior: no  Suicidal ideation: yes, no plan  Homicidal ideation: no  Auditory hallucinations: no  Visual hallucinations: no  Other hallucinations: no  Delusional thinking: no  Eating disorder history: no  Obsessive/compulsive symptoms: no    Historical Information     Past Psychiatric History:     Past Inpatient Psychiatric Treatment:   One past inpatient psychiatric admission at [facility]  Past Outpatient Psychiatric Treatment:    Was in outpatient psychiatric treatment in the past with a psychiatrist   Past Suicide Attempts:    no  Past Violent Behavior:    no  Past Psychiatric Medication Trials:    Prozac, risperidone, Seroquel, Celexa, Abilify  Substance Abuse History:  Social History     Tobacco History     Smoking Status  Current Every Day Smoker    Smokeless Tobacco Use  Never Used    Tobacco Comment   quit January 2017- former smoker 20 years 1/2 pack/ day          Alcohol History     Alcohol Use Status  No          Drug Use     Drug Use Status  Yes Comment  abuses inhalatants             Sexual Activity     Sexually Active  Not Asked          Activities of Daily Living    Not Asked               Additional Substance Use Detail     Questions Responses    Substance Use Assessment Substance use within the past 12 months    Alcohol Use Frequency Denies use in past 12 months    Cannabis frequency Denies use in past 12 months    Cocaine frequency Denies past use in past 12 months    Crack Cocaine Frequency Denies use in past 12 months    Methamphetamine Frequency Denies use in past 12 months    Narcotic Frequency Denies use in past 12 months    Benzodiazepine Frequency Denies use in past 12 months    Amphetamine frequency Denies use in past 12 months    Barbituate Frequency Denies use use in past 12 months    Inhalant frequency Daily    Hallucinogen frequency Denies use in past 12 months    Inhalant 1st Use July 2017    Inhalant Last Use & Amount today (10/31/17)    Ecstasy frequency Denies use past 12 months    Other drug frequency Denies use in past 12 months    Opiate frequency Denies use in past 12 months    Last reviewed by Shara Biggs RN on 11/1/2017        I have assessed this patient for substance use within the past 12 months    Alcohol use: denies current use  Recreational drug use:   Cocaine:  denies use  Heroin:  denies use  Marijuana:  denies use  Other drugs: inhallers-  Longest clean time: 6 months  History of Inpatient/Outpatient rehabilitation program: yes  Smoking history: quit 9 months ago quit in [address] [place] [address]       Family Psychiatric History:     Psychiatric Illness:  patient denies  Substance Abuse:  patient denies  Suicide Attempts:  patient denies    Social History:    Education: high school graduate  Learning Disabilities: none  Marital History: co-habitating  Children: none  Living Arrangement: The patient lives in home with [facility]  Occupational History: works as [employer]  Functioning Relationships: limited support system  Legal History: no current legal problems   History: None        Traumatic History:     Abuse: no history of sexual abuse  Other Traumatic Events: none     Past Medical History:    History of Seizures: yes  History of Head injury with loss of consciousness: yes, history of concussion    Past Medical History:   Diagnosis Date    Cancer [facility])     Thyroid Cancer    Depression     Disease of thyroid gland     Inhalation of noxious substance     Psychiatric disorder      Past Surgical History:   Procedure Laterality Date    ARTHROSCOPY KNEE Right     CO THYROIDECTOMY N/A 1/23/2017    Procedure: TOTAL THYROIDECTOMY;  Surgeon: Leonor Dozier MD;  [facility]: [facility];  Service: Surgical Oncology    TOE AMPUTATION Right 2000    traumatic amputation of middle/ 3rd toe       Medical Review Of Systems:    A comprehensive review of systems was negative  Allergies:    No Known Allergies    Medications: All current active medications have been reviewed      Objective     Vital signs in last 24 hours:    Temp:  [98 °F (36 7 °C)-98 1 °F (36 7 °C)] 98 1 °F (36 7 °C)  HR:  [] 75  Resp:  [16-19] 16  BP: (125-140)/(63-85) 129/74    No intake or output data in the 24 hours ending 11/01/17 1632     Mental Status Evaluation:    Appearance:  age appropriate, casually dressed   Behavior:  cooperative   Speech:  decreased rate, slow   Mood:  depressed   Affect:  constricted   Language: repeating phrases   Thought Process:  negative thinking   Associations: concrete associations   Thought Content:  no overt delusions   Perceptual Disturbances: no auditory hallucinations, no visual hallucinations   Risk Potential: Suicidal ideation - None at present  Homicidal ideation - None  Potential for aggression - No   Sensorium:  oriented to person, place and time/date   Memory:  recent and remote memory grossly intact   Consciousness:  alert and awake   Attention: attention span and concentration are age appropriate   Intellect: within normal limits   Fund of Knowledge: awareness of current events: yes   Insight:  impaired   Judgment: partial   Muscle Strength Muscle Tone: normal  normal   Gait/Station: normal gait/station and normal balance   Motor Activity: no abnormal movements Laboratory Results:   I have personally reviewed all pertinent laboratory/tests results  Most Recent Labs:   Lab Results   Component Value Date    WBC 9 24 10/31/2017    RBC 5 21 10/31/2017    HGB 14 7 10/31/2017    HCT 45 1 10/31/2017     10/31/2017    RDW 13 0 10/31/2017    NEUTROABS 7 82 (H) 10/31/2017     10/31/2017    K 3 9 10/31/2017     10/31/2017    CO2 28 10/31/2017    BUN 15 10/31/2017    CREATININE 0 94 10/31/2017    GLUCOSE 142 (H) 10/31/2017    CALCIUM 9 1 10/31/2017    AST 57 (H) 10/31/2017     (H) 10/31/2017    ALKPHOS 171 (H) 10/31/2017    PROT 8 6 (H) 10/31/2017    ALBUMIN 4 7 10/31/2017    BILITOT 0 40 10/31/2017    CHOL 222 (H) 10/07/2017    HDL 36 (L) 10/07/2017    TRIG 222 (H) 10/07/2017    LDLCALC 142 (H) 10/07/2017    CNI7FWFALYFM 2 130 11/01/2017    FREET4 1 04 10/07/2017    T3FREE 28 7 06/03/2017       Imaging Studies: Xr Chest Pa & Lateral    Result Date: 10/31/2017  Narrative: CHEST 2 View INDICATION:  Shortness of breath for hours  Former smoker  History of thyroid cancer  COMPARISON:  12/28/2016 VIEWS:  PA and lateral projections; 3 images FINDINGS:     Cardiomediastinal silhouette appears unremarkable  The lungs are clear  No pneumothorax or pleural effusion  Visualized osseous structures appear within normal limits for the patient's age  Impression: No active pulmonary disease  Workstation performed: [id number]     Cta Chest Abdomen Pelvis W Wo Contrast    Result Date: 10/17/2017  Narrative: CTA - CHEST, ABDOMEN AND PELVIS - WITHOUT AND WITH IV CONTRAST INDICATION: Inhalant abuse, sudden onset severe left chest wall and left abdominal pain  COMPARISON: Chest CT 5/3/2016  CT abdomen pelvis 7/13/2017   TECHNIQUE: CT examination of the chest, abdomen and pelvis was performed both prior to and after the administration of intravenous contrast   Thin section angiographic arterial phase post contrast technique was used in order to evaluate for aortic dissection  3D reformatted images and volume rendering were performed on an independent workstation  Additionally, reformatted images were created in axial, sagittal, and coronal planes  Radiation dose length product (DLP) for this visit:  1987 mGy-cm   This examination, like all CT scans performed in the [facility], was performed utilizing techniques to minimize radiation dose exposure, including the use of iterative reconstruction and automated exposure control  IV Contrast:  100 mL of iohexol (OMNIPAQUE)     Enteric Contrast:  Enteric contrast was not administered  FINDINGS: AORTA:  There is no aortic dissection  There is no aortic aneurysm  CHEST LUNGS:  Mild dependent atelectasis bilaterally  No acute infiltrates  Linear atelectasis or scarring noted in the lingula  PLEURA:  Unremarkable  HEART/PULMONARY ARTERIAL TREE:  Heart is unremarkable for the patient's age  Within the limitations of this examination there is no evidence of pulmonary embolus  MEDIASTINUM AND TORRIE:  Unremarkable  CHEST WALL AND LOWER NECK:       Normal  ABDOMEN LIVER/BILIARY TREE:  There is a tiny hyperattenuating focus in the medial segment of the left hepatic lobe, measuring 9 mm on image 3/106  Retrospectively, this was present on the study of 5/3/2016, and is unchanged, possibly a small vascular malformation or hemangioma  GALLBLADDER:  No calcified gallstones  No pericholecystic inflammatory change  SPLEEN:  Unremarkable  PANCREAS:  Unremarkable  ADRENAL GLANDS:  Unremarkable  KIDNEYS/URETERS:  Unremarkable  No hydronephrosis  PELVIS REPRODUCTIVE ORGANS:  Unremarkable for patient's age  URINARY BLADDER:  Unremarkable  ADDITIONAL ABDOMINAL AND PELVIC STRUCTURES STOMACH AND BOWEL:  Unremarkable  APPENDIX:  No findings to suggest appendicitis  ABDOMINOPELVIC CAVITY:  No ascites or free intraperitoneal air  No lymphadenopathy    ABDOMINAL WALL/INGUINAL REGIONS:  There is mild infiltration of the subcutaneous fat in the left upper and lower quadrants, possibly related to contusion  OSSEOUS STRUCTURES:  No acute fracture or destructive osseous lesion  Impression: No acute pathology  No aortic aneurysm or dissection  Mild infiltration of the subcutaneous fat in the left abdomen, possibly representing contusions  Workstation performed: [id number]     Us Right Upper Quadrant    Result Date: 10/31/2017  Narrative: RIGHT UPPER QUADRANT ULTRASOUND INDICATION:  Right upper quadrant pain  COMPARISON:  None  TECHNIQUE:   Real-time ultrasound of the right upper quadrant was performed with a curvilinear transducer with both volumetric sweeps and still imaging techniques  FINDINGS: PANCREAS:  Visualized portions of the pancreas are within normal limits  AORTA AND IVC:  Visualized portions are normal for patient age  LIVER: Size:  Within normal range  The liver measures 16 7 cm in the midclavicular line  Contour:  Surface contour is smooth  Parenchyma: There is mild diffuse increased echogenicity with smooth echotexture, without significant beam attenuation or loss of periportal echogenicity  Most consistent with mild hepatic steatosis  No evidence of suspicious mass  Limited imaging of the main portal vein shows it to be patent and hepatopetal   BILIARY: The gallbladder is normal in caliber  No wall thickening or pericholecystic fluid  No stones or sludge identified  No sonographic Delgado's sign  Gallbladder hypoechoic subcentimeter structures within the wall or attached to the wall, largest measuring 4 mm could represent gallbladder polyps and/or adenomyomatosis  No intrahepatic biliary dilatation  CBD does not appear to be dilated  No choledocholithiasis  KIDNEY: Right kidney normal size  Within normal limits  ASCITES:   None  Impression: 1  Mild hepatic steatosis   2  Gallbladder hypoechoic subcentimeter structures within the wall or attached to the wall, largest measuring 4 mm could represent gallbladder polyps and/or adenomyomatosis  Workstation performed: [id number]     Ct Abdomen Pelvis With Contrast    Result Date: 10/18/2017  Narrative: CT ABDOMEN AND PELVIS WITH IV CONTRAST INDICATION:  Left upper quadrant abdominal trauma  COMPARISON: CT abdomen/pelvis dated October 17, 2017  TECHNIQUE:  CT examination of the abdomen and pelvis was performed  Reformatted images were created in axial, sagittal, and coronal planes  Radiation dose length product (DLP) for this visit:  1052 mGy-cm   This examination, like all CT scans performed in the [facility], was performed utilizing techniques to minimize radiation dose exposure, including the use of iterative reconstruction and automated exposure control  IV Contrast:  100 mL of iohexol (OMNIPAQUE)     Enteric Contrast:  Enteric contrast was not administered  FINDINGS: ABDOMEN LOWER CHEST:  Atelectatic changes are noted at the lung bases  No other significant abnormality identified in the lower chest  LIVER/BILIARY TREE:  Unremarkable  GALLBLADDER:  No calcified gallstones  No pericholecystic inflammatory change  SPLEEN:  Unremarkable  PANCREAS:  Unremarkable  ADRENAL GLANDS:  Unremarkable  KIDNEYS/URETERS:  Unremarkable  No hydronephrosis  STOMACH AND BOWEL:  Unremarkable  APPENDIX:  A normal appendix was visualized  ABDOMINOPELVIC CAVITY:  No ascites or free intraperitoneal air  No lymphadenopathy  VESSELS:  Unremarkable for patient's age  PELVIS REPRODUCTIVE ORGANS:  Unremarkable for patient's age  URINARY BLADDER:  Unremarkable  ABDOMINAL WALL/INGUINAL REGIONS:  There is a tiny fat-containing umbilical hernia  There is mild subcutaneous inflammatory stranding involving the left anterior abdomen likely posttraumatic in nature  OSSEOUS STRUCTURES:  No acute fracture or destructive osseous lesion  Impression: No evidence of solid organ injury  No acute intra-abdominal abnormality  No free air or free fluid   Mild subcutaneous inflammatory stranding involving the left anterior midabdomen likely posttraumatic in nature  Workstation performed: [id number]       Code Status: Full  Advance Directive and Living Will: <no information>    Assessment/Plan   MDD severe without psychotic features  Substance abuse  Insomnia  Patient Strengths: average or above intelligence, capable of independent living, cooperative, communication skills, general fund of knowledge, motivated, negotiates basic needs, patient is on a voluntary commitment, stable/recent employment, work skills     Patient Barriers: limited education, limited family ties, limited insight, limited motivation, no/few hobbies or interests, noncompliant with medication, substance abuse, unresourceful    Treatment Plan: Will further evaluate patient's labs  Will order while out more to follow up on labs abnormalities  Will consult Medicine to follow up was a normal LFTs and other labs  Planned Treatment and Medication Changes: All current active medications have been reviewed  Encourage group therapy, milieu therapy and occupational therapy  Behavioral Health checks every 15 minutes  Start a combination of Remeron at night to help with insomnia and anxiety and depression, Wellbutrin in the morning to help with depression and low energy due level, and will restart Abilify as augmentation of patient's antidepressants  Motivational therapy for inhalers to support recovery and relapse prevention  Risks / Benefits of Treatment:    Risks, benefits, and possible side effects of medications explained to patient and patient verbalizes understanding and agreement for treatment  Counseling / Coordination of Care:    Patient's presentation on admission and proposed treatment plan discussed with staff  Events leading to admission reviewed with patient  Importance of medication and treatment compliance reviewed with patient    Discussed with patient need for drug and alcohol counseling after discharge  Outpatient follow up discussed with patient  Supportive therapy provided to patient  Inpatient Psychiatric Certification:    Estimated length of stay: 5 midnights    Based upon physical, mental and social evaluations, I certify that inpatient psychiatric services are medically necessary for this patient for a duration of 5 midnights for the treatment of Severe episode of recurrent major depressive disorder, without psychotic features ([facility] [address] )    Available alternative community resources do not meet the patient's mental health care needs  I further attest that an established written individualized plan of care has been implemented and is outlined in the patient's medical records  Portions of the record may have been created with voice recognition software  Occasional wrong word or "sound a like" substitutions may have occurred due to the inherent limitations of voice recognition software  Read the chart carefully and recognize, using context, where substitutions have occurred  There may be translation, syntax,  or grammatical errors  If you have any questions, please contact the dictating provider

## 2017-11-01 NOTE — PROGRESS NOTES
Patient about the unit and socializing with select peers  Denies SI and does admit to an increase in depression which caused an increase in "huffing"  No behavioral issues  Will continue to monitor

## 2017-11-01 NOTE — PLAN OF CARE
Problem: SUBSTANCE USE/ABUSE  Goal: By discharge, will develop insight into their chemical dependency and sustain motivation to continue in recovery  INTERVENTIONS:  - Attends all daily group sessions and scheduled AA groups  - Actively practices coping skills through participation in the therapeutic community and adherence to program rules  - Reviews and completes assignments from individual treatment plan  - Assist patient development of understanding of their personal cycle of addiction and relapse triggers   Outcome: Progressing      Problem: Risk for Self Injury/Neglect  Goal: Treatment Goal: Remain safe during length of stay, learn and adopt new coping skills, and be free of self-injurious ideation, impulses and acts at the time of discharge  Outcome: Progressing      Problem: Depression  Goal: Treatment Goal: Demonstrate behavioral control of depressive symptoms, verbalize feelings of improved mood/affect, and adopt new coping skills prior to discharge  Outcome: Progressing      Problem: Anxiety  Goal: Anxiety is at manageable level  Interventions:  - Assess and monitor patient's anxiety level  - Monitor for signs and symptoms of anxiety both physical and emotional (heart palpitations, chest pain, shortness of breath, headaches, nausea, feeling jumpy, restlessness, irritable, apprehensive)  - Collaborate with interdisciplinary team and initiate plan and interventions as ordered    - [place] patient to unit/surroundings  - Explain treatment plan  - Encourage participation in care  - Encourage verbalization of concerns/fears  - Identify coping mechanisms  - Assist in developing anxiety-reducing skills  - Administer/offer alternative therapies  - Limit or eliminate stimulants   Outcome: Progressing      Problem: DISCHARGE PLANNING  Goal: Discharge to home or other facility with appropriate resources  INTERVENTIONS:  - Identify barriers to discharge w/patient and caregiver  - Arrange for needed discharge resources and transportation as appropriate  - Identify discharge learning needs (meds, wound care, etc )  - Arrange for interpretive services to assist at discharge as needed  - Refer to Case Management Department for coordinating discharge planning if the patient needs post-hospital services based on physician/advanced practitioner order or complex needs related to functional status, cognitive ability, or social support system   Outcome: Progressing      Problem: Ineffective Coping  Goal: Participates in unit activities  Interventions:  - Provide therapeutic environment   - Provide required programming   - Redirect inappropriate behaviors    Outcome: Progressing

## 2017-11-01 NOTE — H&P
History and Physical - General Surgery   Kaleb Veliz 42 y o  male MRN: [id number]  Unit/Bed#: WG6 764-01 Encounter: [id number]    History of Present Illness     HPI:  Kaleb Veliz is a 42 y o  male who presents with worsening depression and increased usage of huffing "dust-off" keyboard  which contains hydrocarbons  He stated increased stress from work and home in which he lives with his finance' and 2 children  He admits to using the inhalant about once weekly, but recently has used it more frequently and inhaled 3 cans of it the day of admission  He denies any other drug or alcohol use, and stated that he has not been compliant with his antidepressant medication including Celexa and Abilify  He stated that he had a total thyroidectomy in January of 2017 for thyroid cancer  He also denies HI, SI, or hallucinations  He states history of depression with 3 previous psych admissions to [address] [place] [address] most recently in summer of 2017  Now admitted under 201 voluntary admission to inpatient behavioral health unit for further evaluation and treatment of worsening depression and use of Inhalants  Review of Systems   Constitutional: Negative for chills, diaphoresis, fatigue, fever and unexpected weight change  HENT: Negative for congestion, ear pain, hearing loss, postnasal drip, rhinorrhea, sore throat and trouble swallowing  Eyes: Negative for photophobia, redness and visual disturbance  Respiratory: Negative for cough, chest tightness, shortness of breath, wheezing and stridor  Cardiovascular: Negative for chest pain, palpitations and leg swelling  Gastrointestinal: Negative for abdominal distention, abdominal pain, constipation, diarrhea, nausea and vomiting  Endocrine: Negative for cold intolerance, heat intolerance, polydipsia, polyphagia and polyuria  Genitourinary: Positive for frequency   Negative for decreased urine volume, difficulty urinating, dysuria, flank pain, hematuria and urgency  States nocturia X 3-4, but denies dysuria, fever, chills  Musculoskeletal: Positive for back pain  Negative for arthralgias, gait problem, joint swelling and myalgias  States having lower back discomfort/ pain, and did admit to falling from "passing out" recently with bruising on his upper left abdomen and left flank  Skin: Negative for color change, pallor and rash  Skin ecchymosis which is resolving from recent fall  Neurological: Positive for dizziness  Negative for tremors, seizures, syncope, facial asymmetry, speech difficulty, weakness, light-headedness, numbness and headaches  States dizziness from 'huffing' inhalants  Hematological: Negative for adenopathy  Does not bruise/bleed easily  Psychiatric/Behavioral: Positive for dysphoric mood  Negative for behavioral problems, confusion, hallucinations and suicidal ideas  States worsening depressing and using Inhalants to alleviate his problems  Also states past history of wrist cutting  Historical Information   Past Medical History:   Diagnosis Date    Cancer [facility])     Thyroid Cancer    Depression     Disease of thyroid gland     Inhalation of noxious substance     Psychiatric disorder      Past Surgical History:   Procedure Laterality Date    ARTHROSCOPY KNEE Right     MT THYROIDECTOMY N/A 1/23/2017    Procedure: TOTAL THYROIDECTOMY;  Surgeon: Shannan Vargas MD;  Location: [facility];  Service: Surgical Oncology    TOE AMPUTATION Right 2000    traumatic amputation of middle/ 3rd toe     Social History   History   Alcohol Use No     History   Drug Use     Comment: abuses inhalatants        History   Smoking Status    Current Every Day Smoker   Smokeless Tobacco    Never Used     Comment:  quit January 2017- former smoker 20 years 1/2 pack/ day     Family History:   Family History   Problem Relation Age of Onset    Diabetes Mother     Cancer Father        Meds/Allergies Current Facility-Administered Medications   Medication Dose Route Frequency    acetaminophen (TYLENOL) tablet 650 mg  650 mg Oral Q6H PRN    aluminum-magnesium hydroxide-simethicone (MYLANTA) 200-200-20 mg/5 mL oral suspension 30 mL  30 mL Oral Q4H PRN    benztropine (COGENTIN) injection 1 mg  1 mg Intramuscular Q6H PRN    benztropine (COGENTIN) tablet 1 mg  1 mg Oral Q6H PRN    haloperidol (HALDOL) tablet 5 mg  5 mg Oral Q6H PRN    LORazepam (ATIVAN) tablet 1 mg  1 mg Oral Q6H PRN    magnesium hydroxide (MILK OF MAGNESIA) 400 mg/5 mL oral suspension 30 mL  30 mL Oral Daily PRN    nicotine polacrilex (NICORETTE) gum 2 mg  2 mg Oral Q2H PRN    OLANZapine (ZyPREXA) IM injection 10 mg  10 mg Intramuscular BID PRN    risperiDONE (RisperDAL M-TABS) dispersible tablet 1 mg  1 mg Oral Q3H PRN    traZODone (DESYREL) tablet 50 mg  50 mg Oral HS PRN     Prescriptions Prior to Admission   Medication    ARIPiprazole (ABILIFY) 10 mg tablet    citalopram (CeleXA) 10 mg tablet    levothyroxine 175 mcg tablet     No Known Allergies    Objective     /84   Pulse 101   Temp 98 °F (36 7 °C) (Oral)   Resp 18   Ht 6' (1 829 m)   Wt 118 kg (260 lb)   BMI 35 26 kg/m²     Current Vitals:   Blood Pressure: 125/84 (11/01/17 0741)  Pulse: 101 (11/01/17 0741)  Temperature: 98 °F (36 7 °C) (11/01/17 0741)  Temp Source: Oral (11/01/17 0741)  Respirations: 18 (11/01/17 0741)  Height: 6' (182 9 cm) (11/01/17 0115)  Weight - Scale: 118 kg (260 lb) (11/01/17 0115)    No intake or output data in the 24 hours ending 11/01/17 0854    Invasive Devices          No matching active lines, drains, or airways          Physical Exam   Constitutional: He is oriented to person, place, and time  He appears well-developed and well-nourished  No distress  HENT:   Head: Normocephalic and atraumatic     Right Ear: External ear normal    Left Ear: External ear normal    Nose: Nose normal    Mouth/Throat: Oropharynx is clear and moist  No oropharyngeal exudate  Eyes: Conjunctivae and EOM are normal  Pupils are equal, round, and reactive to light  Neck: Normal range of motion  Neck supple  No tracheal deviation present  No thyromegaly present  There is an anterior well-healed surgical scar in his neck s/p total thyroidectomy  Cardiovascular: Normal rate, regular rhythm, normal heart sounds and intact distal pulses  Exam reveals no gallop and no friction rub  No murmur heard  Pulmonary/Chest: Effort normal and breath sounds normal  No respiratory distress  He has no wheezes  He has no rales  Abdominal: Soft  Bowel sounds are normal  He exhibits no distension and no mass  There is no tenderness  There is no rebound and no guarding  There is some resolving ecchymosis in his LUQ abdominal region and his left flank  Genitourinary: Penis normal    Genitourinary Comments: No gross evidence of hernias  Musculoskeletal: Normal range of motion  He exhibits no edema, tenderness or deformity  There is an amputation of his right 3rd toe  There was no spinous process tenderness  Lymphadenopathy:     He has no cervical adenopathy  Neurological: He is alert and oriented to person, place, and time  He has normal reflexes  No cranial nerve deficit  He exhibits normal muscle tone  Coordination normal    Skin: Skin is warm and dry  No rash noted  He is not diaphoretic  No erythema  Few resolving ecchymosis markings as described previously  Psychiatric: He has a normal mood and affect   His behavior is normal  Judgment and thought content normal          Lab Results:   Admission on 10/31/2017, Discharged on 11/01/2017   Component Date Value    WBC 10/31/2017 9 24     RBC 10/31/2017 5 21     Hemoglobin 10/31/2017 14 7     Hematocrit 10/31/2017 45 1     MCV 10/31/2017 87     MCH 10/31/2017 28 2     MCHC 10/31/2017 32 6     RDW 10/31/2017 13 0     MPV 10/31/2017 8 9     Platelets 41/86/4633 282     nRBC 10/31/2017 0     Neutrophils Relative 10/31/2017 85*    Lymphocytes Relative 10/31/2017 11*    Monocytes Relative 10/31/2017 2*    Eosinophils Relative 10/31/2017 0     Basophils Relative 10/31/2017 0     Neutrophils Absolute 10/31/2017 7 82*    Lymphocytes Absolute 10/31/2017 1 04     Monocytes Absolute 10/31/2017 0 22     Eosinophils Absolute 10/31/2017 0 03     Basophils Absolute 10/31/2017 0 04     Sodium 10/31/2017 144     Potassium 10/31/2017 3 9     Chloride 10/31/2017 103     CO2 10/31/2017 28     Anion Gap 10/31/2017 13     BUN 10/31/2017 15     Creatinine 10/31/2017 0 94     Glucose 10/31/2017 142*    Calcium 10/31/2017 9 1     AST 10/31/2017 57*    ALT 10/31/2017 110*    Alkaline Phosphatase 10/31/2017 171*    Total Protein 10/31/2017 8 6*    Albumin 10/31/2017 4 7     Total Bilirubin 10/31/2017 0 40     eGFR 10/31/2017 102     pH, Julian 10/31/2017 7 404*    pCO2, Julian 10/31/2017 41 3*    pO2, Julian 10/31/2017 51 5*    HCO3, Julian 10/31/2017 25 2     Base Excess, Julian 10/31/2017 0 4     O2 Content, Julian 10/31/2017 18 3     O2 HGB, VENOUS 10/31/2017 84 9*    Methemoglobin 10/31/2017 0 3     Carbon Monoxide, Blood 10/31/2017 1 9*    Acetaminophen Level 59/57/2502 <5 5*    Salicylate Lvl 29/31/1543 <3*    Ventricular Rate 10/31/2017 116     Atrial Rate 10/31/2017 116     WI Interval 10/31/2017 146     QRSD Interval 10/31/2017 86     QT Interval 10/31/2017 346     QTC Interval 10/31/2017 480     P Axis 10/31/2017 21     QRS Axis 10/31/2017 36     T Wave Axis 10/31/2017 15      Imaging: I have personally reviewed pertinent reports  EKG, Pathology, and Other Studies: I have personally reviewed pertinent reports  Assessment/Plan     Assessment:  1  This is a 42year old white male who states worsening depression and using "Inhalants" instead of his prescribed antidepressant medications  2  History of Thyroid cancer- s/p total thyroidectomy in January, 2017   He was compliant with his thyroid replacement medication  Continue his levothyroxine  3  States recent fall secondary to inhalation induced syncope with mild lower back pain-  will order ibuprofen prn    4  Some elevation of LFT's- most likely from chemical inhalation  May repeat in approximately one week if still inpatient  Plan:  1  Admit under voluntary 201 inpatient admission for further psych evaluation and treatment  Counseling / Coordination of Care  Total floor / unit time spent today 1 hour  Greater than 50% of total time was spent with the patient and / or family counseling and / or coordination of care        Leanna Huertas PA-C  11/1/2017

## 2017-11-02 LAB
ALBUMIN SERPL BCP-MCNC: 3.7 G/DL (ref 3.5–5)
ALP SERPL-CCNC: 138 U/L (ref 46–116)
ALT SERPL W P-5'-P-CCNC: 60 U/L (ref 12–78)
ANION GAP SERPL CALCULATED.3IONS-SCNC: 5 MMOL/L (ref 4–13)
AST SERPL W P-5'-P-CCNC: 17 U/L (ref 5–45)
BILIRUB SERPL-MCNC: 0.3 MG/DL (ref 0.2–1)
BUN SERPL-MCNC: 13 MG/DL (ref 5–25)
CALCIUM SERPL-MCNC: 8.9 MG/DL (ref 8.3–10.1)
CHLORIDE SERPL-SCNC: 105 MMOL/L (ref 100–108)
CO2 SERPL-SCNC: 29 MMOL/L (ref 21–32)
CREAT SERPL-MCNC: 0.82 MG/DL (ref 0.6–1.3)
EST. AVERAGE GLUCOSE BLD GHB EST-MCNC: 114 MG/DL
GFR SERPL CREATININE-BSD FRML MDRD: 111 ML/MIN/1.73SQ M
GLUCOSE P FAST SERPL-MCNC: 114 MG/DL (ref 65–99)
GLUCOSE SERPL-MCNC: 114 MG/DL (ref 65–140)
HBA1C MFR BLD: 5.6 % (ref 4.2–6.3)
POTASSIUM SERPL-SCNC: 4 MMOL/L (ref 3.5–5.3)
PROT SERPL-MCNC: 6.9 G/DL (ref 6.4–8.2)
SODIUM SERPL-SCNC: 139 MMOL/L (ref 136–145)

## 2017-11-02 PROCEDURE — 80053 COMPREHEN METABOLIC PANEL: CPT | Performed by: PSYCHIATRY & NEUROLOGY

## 2017-11-02 PROCEDURE — 83036 HEMOGLOBIN GLYCOSYLATED A1C: CPT | Performed by: PSYCHIATRY & NEUROLOGY

## 2017-11-02 RX ORDER — BUPROPION HYDROCHLORIDE 100 MG/1
100 TABLET, EXTENDED RELEASE ORAL EVERY 12 HOURS SCHEDULED
Status: DISCONTINUED | OUTPATIENT
Start: 2017-11-02 | End: 2017-11-04

## 2017-11-02 RX ORDER — ARIPIPRAZOLE 5 MG/1
5 TABLET ORAL
Status: DISCONTINUED | OUTPATIENT
Start: 2017-11-02 | End: 2017-11-07 | Stop reason: HOSPADM

## 2017-11-02 RX ADMIN — MIRTAZAPINE 15 MG: 15 TABLET, FILM COATED ORAL at 21:38

## 2017-11-02 RX ADMIN — BUPROPION HYDROCHLORIDE 100 MG: 100 TABLET, FILM COATED, EXTENDED RELEASE ORAL at 21:38

## 2017-11-02 RX ADMIN — GABAPENTIN 100 MG: 100 CAPSULE ORAL at 17:30

## 2017-11-02 RX ADMIN — TRAZODONE HYDROCHLORIDE 50 MG: 50 TABLET ORAL at 21:38

## 2017-11-02 RX ADMIN — ARIPIPRAZOLE 5 MG: 5 TABLET ORAL at 17:29

## 2017-11-02 RX ADMIN — IBUPROFEN 600 MG: 600 TABLET, FILM COATED ORAL at 07:46

## 2017-11-02 RX ADMIN — GABAPENTIN 100 MG: 100 CAPSULE ORAL at 21:38

## 2017-11-02 RX ADMIN — LEVOTHYROXINE SODIUM 175 MCG: 125 TABLET ORAL at 05:47

## 2017-11-02 RX ADMIN — BUPROPION HYDROCHLORIDE 100 MG: 100 TABLET, EXTENDED RELEASE ORAL at 08:01

## 2017-11-02 RX ADMIN — GABAPENTIN 100 MG: 100 CAPSULE ORAL at 08:01

## 2017-11-02 NOTE — PROGRESS NOTES
Pt is pleasant and about unit  Denies SI, continues depressed  No c/o anxiety  Denies VH  Looking forward to children visiting this evening  Compliant with meds and care  Will monitor

## 2017-11-02 NOTE — PLAN OF CARE
Problem: DISCHARGE PLANNING  Goal: Discharge to home or other facility with appropriate resources  INTERVENTIONS:  - Identify barriers to discharge w/patient and caregiver  - Arrange for needed discharge resources and transportation as appropriate  - Identify discharge learning needs (meds, wound care, etc )  - Arrange for interpretive services to assist at discharge as needed  - Refer to Case Management Department for coordinating discharge planning if the patient needs post-hospital services based on physician/advanced practitioner order or complex needs related to functional status, cognitive ability, or social support system   Outcome: Progressing  CM met with patient  Patient sees the need for outpatient treatment upon discharge   Set up intake at [facility] for Monday, 11/13/17 at 10:30am

## 2017-11-02 NOTE — PROGRESS NOTES
Patient about the unit  Slight irritable edge  Denies SI but continues to c/o depression  Compliant with medications

## 2017-11-02 NOTE — PLAN OF CARE
Problem: SUBSTANCE USE/ABUSE  Goal: By discharge, will develop insight into their chemical dependency and sustain motivation to continue in recovery  INTERVENTIONS:  - Attends all daily group sessions and scheduled AA groups  - Actively practices coping skills through participation in the therapeutic community and adherence to program rules  - Reviews and completes assignments from individual treatment plan  - Assist patient development of understanding of their personal cycle of addiction and relapse triggers   Outcome: Progressing      Problem: Risk for Self Injury/Neglect  Goal: Treatment Goal: Remain safe during length of stay, learn and adopt new coping skills, and be free of self-injurious ideation, impulses and acts at the time of discharge  Outcome: Progressing      Problem: Depression  Goal: Treatment Goal: Demonstrate behavioral control of depressive symptoms, verbalize feelings of improved mood/affect, and adopt new coping skills prior to discharge  Outcome: Progressing      Problem: Anxiety  Goal: Anxiety is at manageable level  Interventions:  - Assess and monitor patient's anxiety level  - Monitor for signs and symptoms of anxiety both physical and emotional (heart palpitations, chest pain, shortness of breath, headaches, nausea, feeling jumpy, restlessness, irritable, apprehensive)  - Collaborate with interdisciplinary team and initiate plan and interventions as ordered    - [place] patient to unit/surroundings  - Explain treatment plan  - Encourage participation in care  - Encourage verbalization of concerns/fears  - Identify coping mechanisms  - Assist in developing anxiety-reducing skills  - Administer/offer alternative therapies  - Limit or eliminate stimulants   Outcome: Progressing      Problem: DISCHARGE PLANNING  Goal: Discharge to home or other facility with appropriate resources  INTERVENTIONS:  - Identify barriers to discharge w/patient and caregiver  - Arrange for needed discharge resources and transportation as appropriate  - Identify discharge learning needs (meds, wound care, etc )  - Arrange for interpretive services to assist at discharge as needed  - Refer to Case Management Department for coordinating discharge planning if the patient needs post-hospital services based on physician/advanced practitioner order or complex needs related to functional status, cognitive ability, or social support system   Outcome: Progressing      Problem: Ineffective Coping  Goal: Participates in unit activities  Interventions:  - Provide therapeutic environment   - Provide required programming   - Redirect inappropriate behaviors    Outcome: Progressing      Problem: SUBSTANCE USE/ABUSE  Goal: By discharge, will develop insight into their chemical dependency and sustain motivation to continue in recovery  INTERVENTIONS:  - Attends all daily group sessions and scheduled AA groups  - Actively practices coping skills through participation in the therapeutic community and adherence to program rules  - Reviews and completes assignments from individual treatment plan  - Assist patient development of understanding of their personal cycle of addiction and relapse triggers   Outcome: Progressing    Goal: By discharge, patient will have ongoing treatment plan addressing chemical dependency  INTERVENTIONS:  - Assist patient with resources and/or appointments for ongoing recovery based living   Outcome: Progressing

## 2017-11-03 RX ADMIN — ARIPIPRAZOLE 5 MG: 5 TABLET ORAL at 19:38

## 2017-11-03 RX ADMIN — GABAPENTIN 100 MG: 100 CAPSULE ORAL at 09:01

## 2017-11-03 RX ADMIN — IBUPROFEN 600 MG: 600 TABLET, FILM COATED ORAL at 06:03

## 2017-11-03 RX ADMIN — BUPROPION HYDROCHLORIDE 100 MG: 100 TABLET, FILM COATED, EXTENDED RELEASE ORAL at 22:04

## 2017-11-03 RX ADMIN — BUPROPION HYDROCHLORIDE 100 MG: 100 TABLET, FILM COATED, EXTENDED RELEASE ORAL at 09:01

## 2017-11-03 RX ADMIN — MIRTAZAPINE 15 MG: 15 TABLET, FILM COATED ORAL at 22:04

## 2017-11-03 RX ADMIN — GABAPENTIN 100 MG: 100 CAPSULE ORAL at 19:38

## 2017-11-03 RX ADMIN — LEVOTHYROXINE SODIUM 175 MCG: 125 TABLET ORAL at 06:00

## 2017-11-03 RX ADMIN — GABAPENTIN 100 MG: 100 CAPSULE ORAL at 22:04

## 2017-11-03 RX ADMIN — TRAZODONE HYDROCHLORIDE 50 MG: 50 TABLET ORAL at 22:14

## 2017-11-03 NOTE — PROGRESS NOTES
Progress Note - Behavioral Health   Joey Carmencita 42 y o  male MRN: @MRN   Unit/Bed#: BZ8 144-61 Encounter: [id number]        The patient was seen for continuing care and reviewed with staff  Report from staff regarding this patient received and discussed, and records reviewed prior to seeing this patient   Patient feels slightly better we discussed his addiction to stimulant and need for follow-up appointment was counseling  Motivational therapy was provided  The his mood is better but he still feels tired and depressed  Does not have auditory hallucinations  Communicate with selected the patient is well  Adapted to milieu  No suicidal gestures  Sleep improved appetite is normal       Medication side effects: No   ROS: tiredness    Mental Status Evaluation:    Appearance:  dressed appropriately, casually dressed   Behavior:  VED_MSE_behavior: cooperative   Mood:  depressed   Affect: less constricted    Speech:  slow   Language: appropriate   Thought Process:  concrete   Associations: circumstantial associations   Thought Content:  normal   Perceptual Disturbances: no auditory hallucinations, no visual hallucinations, denies auditory hallucinations when asked, does not appear responding to internal stimuli   Risk Potential: Suicidal ideation - None  Homicidal ideation - None  Potential for aggression - No   Sensorium:  oriented to person, place and time/date   Memory:  recent and remote memory grossly intact   Consciousness:  alert and awake   Attention: attention span and concentration are normal   Intellect: not examined   Fund of Knowledge: awareness of current events: yes   Insight:  improving and impaired   Judgment: improved   Muscle Tone: normal   Gait/Station: normal gait/station and normal balance   Motor Activity: no abnormal movements         Laboratory results:  I have personally reviewed all pertinent laboratory results      Recent Labs      11/02/17   0551   HGBA1C  5 6   NA  139   K  4 0   CL 105   CO2  29   GLUCOSE  114   CREATININE  0 82   BUN  13     No results for input(s): WBC, RBC, HGB, HCT, MCV, MCH, RDW, PLT in the last 72 hours  Recent Labs      11/02/17   0551   CREATININE  0 82   BUN  13   NA  139   K  4 0   CL  105   CO2  29   GLUCOSE  114   PROT  6 9   ALT  60   AST  17     Recent Labs      11/02/17   0551   ALKPHOS  138*   AST  17   ALT  60   BILITOT  0 30   ALBUMIN  3 7     No results for input(s): TROPONINI, CKTOTAL in the last 72 hours  Invalid input(s): CKMB, PBNP  No results for input(s): CHOL, LDLDIRECT, HDL, TRIG in the last 72 hours  No results for input(s): CRP, SEDRATE, KLARISSA, HAV, HEPAIGM, HEPBIGM, HEPBCAB, HEPCAB in the last 72 hours  Invalid input(s): HEAG    CBC: No results for input(s): WBC, RBC, HGB, HCT, PLT in the last 72 hours  BMP:   Recent Labs      11/02/17   0551   NA  139   K  4 0   CL  105   CO2  29   BUN  13           Progress Toward Goals: improving gradually    Assessment/Plan   Principal Problem:    Severe episode of recurrent major depressive disorder, without psychotic features ([facility] [address] )  Active Problems:    Substance abuse    Drug induced insomnia ([facility] [address] )      Recommended Treatment:  Will continue the present medication and follow up this patient tiredness  His mood is improving  Tiredness will be monitored tomorrow  His addiction was address be motivational interview  Planned medication and treatment changes: All current active medications have been reviewed  Continue treatment with group therapy, milieu therapy, occupational therapy and medication management  Risks / Benefits of Treatment:    Risks, benefits, and possible side effects of medications explained to patient and patient verbalizes understanding and agreement for treatment  Counseling / Coordination of Care:    Patient's progress discussed with staff in treatment team meeting  Medication changes reviewed with staff in treatment team meeting    Medication changes discussed with patient  Recent stressors discussed with patient  Coping with stress discussed with patient  Discussed with patient acceptance of mental illness diagnosis and need for ongoing treatment after discharge  Importance of follow up for substance abuse issues discussed with patient  ** Please Note: This note has been constructed using a voice recognition system   **

## 2017-11-03 NOTE — PLAN OF CARE
Anxiety     Anxiety is at manageable level Progressing        Depression     Treatment Goal: Demonstrate behavioral control of depressive symptoms, verbalize feelings of improved mood/affect, and adopt new coping skills prior to discharge Rafael Juarez Discharge to home or other facility with appropriate resources Progressing        Ineffective Coping     Participates in unit activities Progressing        Risk for Self Injury/Neglect     Treatment Goal: Remain safe during length of stay, learn and adopt new coping skills, and be free of self-injurious ideation, impulses and acts at the time of discharge Progressing        SUBSTANCE USE/ABUSE     By discharge, will develop insight into their chemical dependency and sustain motivation to continue in recovery Progressing        SUBSTANCE USE/ABUSE     By discharge, will develop insight into their chemical dependency and sustain motivation to continue in recovery Progressing     By discharge, patient will have ongoing treatment plan addressing chemical dependency Progressing

## 2017-11-03 NOTE — CASE MANAGEMENT
CM met with patient  Patient was calm, cooperative, seems to be addressing daily living skills  Patient was clean, not malodorous; maintained eye contact throughout session  Patient signed release for his short term disability paperwork to be completed, did check with treatment team, this will be completed over the weekend  Patient did not wish to address his D&A issues upon discharge, stating he only wants to address mental health issues  Was euthymic about appointment set up for him for intake at [facility]  CM will continue to monitor

## 2017-11-03 NOTE — PROGRESS NOTES
Patient reports improving depression  Reports being anxious about work  Report that he "feels a little slower then normal" in reference to his thinking ability  Patient is present in milieu  Pt is calm and cooperative

## 2017-11-04 RX ORDER — BUPROPION HYDROCHLORIDE 150 MG/1
150 TABLET, EXTENDED RELEASE ORAL EVERY 12 HOURS SCHEDULED
Status: DISCONTINUED | OUTPATIENT
Start: 2017-11-04 | End: 2017-11-07 | Stop reason: HOSPADM

## 2017-11-04 RX ADMIN — GABAPENTIN 100 MG: 100 CAPSULE ORAL at 22:51

## 2017-11-04 RX ADMIN — GABAPENTIN 100 MG: 100 CAPSULE ORAL at 18:09

## 2017-11-04 RX ADMIN — TRAZODONE HYDROCHLORIDE 50 MG: 50 TABLET ORAL at 22:52

## 2017-11-04 RX ADMIN — IBUPROFEN 600 MG: 600 TABLET, FILM COATED ORAL at 08:15

## 2017-11-04 RX ADMIN — GABAPENTIN 100 MG: 100 CAPSULE ORAL at 08:15

## 2017-11-04 RX ADMIN — MIRTAZAPINE 15 MG: 15 TABLET, FILM COATED ORAL at 22:51

## 2017-11-04 RX ADMIN — ARIPIPRAZOLE 5 MG: 5 TABLET ORAL at 18:09

## 2017-11-04 RX ADMIN — LEVOTHYROXINE SODIUM 175 MCG: 125 TABLET ORAL at 05:46

## 2017-11-04 RX ADMIN — BUPROPION HYDROCHLORIDE 100 MG: 100 TABLET, FILM COATED, EXTENDED RELEASE ORAL at 08:15

## 2017-11-04 RX ADMIN — BUPROPION HYDROCHLORIDE 150 MG: 150 TABLET, EXTENDED RELEASE ORAL at 22:52

## 2017-11-04 NOTE — PROGRESS NOTES
Patient visible in the milieu and socializing with select peers  Denies SI and reports to RN that he does not feel ready for discharge yet  When RN asked patient what it was that he felt he needed to work on, patient stated that he doesn't know  Patient then went on to say that he knows he needs to go out and "face reality"

## 2017-11-04 NOTE — PROGRESS NOTES
Progress Note - Behavioral Health   Wei Winston 42 y o  male MRN: @MRN   Unit/Bed#: AB8 758-14 Encounter: [id number]        The patient was seen for continuing care and reviewed with staff  Report from staff regarding this patient received and discussed, and records reviewed prior to seeing this patient   The patient was moderately anxious and mildly depressed  He had some irritable H today but he stated that he tolerated the unit and management of well  Denies suicidal ideations or voices  Denied cravings to any substances  Participated in some selected groups  Patient remains anxious, appropriate, better, cooperative, cooperative with milieu, cooperative with staff, depressed and doing better today  Medication side effects: No   ROS: no complaints    Mental Status Evaluation:    Appearance:  dressed appropriately, casually dressed   Behavior:  cooperative   Mood:  improved, depressed, anxious   Affect: constricted    Speech:  decreased rate   Language: appropriate   Thought Process:  circumstantial   Associations: concrete associations   Thought Content:  normal   Perceptual Disturbances: no auditory hallucinations, no visual hallucinations, denies auditory hallucinations when asked, does not appear responding to internal stimuli   Risk Potential: Suicidal ideation - None at present  Homicidal ideation - None  Potential for aggression - No   Sensorium:  oriented to person, place and time   Memory:  recent and remote memory grossly intact   Consciousness:  alert and awake   Attention: attention span and concentration are normal   Intellect: normal   Fund of Knowledge: awareness of current events appropriate   Insight:  improving   Judgment: improving   Muscle Tone: normal   Gait/Station: normal gait/station and normal balance   Motor Activity: no abnormal movements         Laboratory results:  I have personally reviewed all pertinent laboratory results      Recent Labs      11/02/17   0551   HGBA1C 5 6   NA  139   K  4 0   CL  105   CO2  29   GLUCOSE  114   CREATININE  0 82   BUN  13     No results for input(s): WBC, RBC, HGB, HCT, MCV, MCH, RDW, PLT in the last 72 hours  Recent Labs      11/02/17   0551   CREATININE  0 82   BUN  13   NA  139   K  4 0   CL  105   CO2  29   GLUCOSE  114   PROT  6 9   ALT  60   AST  17     Recent Labs      11/02/17   0551   ALKPHOS  138*   AST  17   ALT  60   BILITOT  0 30   ALBUMIN  3 7     No results for input(s): TROPONINI, CKTOTAL in the last 72 hours  Invalid input(s): CKMB, PBNP  No results for input(s): CHOL, LDLDIRECT, HDL, TRIG in the last 72 hours  No results for input(s): CRP, SEDRATE, KLARISSA, HAV, HEPAIGM, HEPBIGM, HEPBCAB, HEPCAB in the last 72 hours  Invalid input(s): HEAG    CBC: No results for input(s): WBC, RBC, HGB, HCT, PLT in the last 72 hours  BMP:   Recent Labs      11/02/17   0551   NA  139   K  4 0   CL  105   CO2  29   BUN  13           Progress Toward Goals: improving progressively    Assessment/Plan   Principal Problem:    Severe episode of recurrent major depressive disorder, without psychotic features ([facility] [address] )  Active Problems:    Substance abuse    Drug induced insomnia ([facility] [address] )      Recommended Treatment:  Because of the patient improvement of depression medication changes at not necessarily today  Supportive therapy was provided regarding patient's anxiety  Motivational therapy provided regarding his pattern of substance abuse  The patient was receptive  Planned medication and treatment changes: All current active medications have been reviewed  Continue treatment with group therapy, milieu therapy, occupational therapy and medication management  Risks / Benefits of Treatment:    Risks, benefits, and possible side effects of medications explained to patient and patient verbalizes understanding and agreement for treatment  Counseling / Coordination of Care:    Patient's progress reviewed with nursing staff    Medication education provided to patient  Supportive therapy provided to patient  Coping strategies discussed with patient  Importance of follow up for substance abuse issues discussed with patient  Crisis/safety plan discussed with patient  ** Please Note: This note has been constructed using a voice recognition system   **

## 2017-11-04 NOTE — PROGRESS NOTES
Pt denies s/s  Pt states he overall had a good day today  PT had good phone conversation w/ wife and children  Pt has been out in milieu playing cards and socializing w/ peers

## 2017-11-04 NOTE — PLAN OF CARE
Problem: SUBSTANCE USE/ABUSE  Goal: By discharge, will develop insight into their chemical dependency and sustain motivation to continue in recovery  INTERVENTIONS:  - Attends all daily group sessions and scheduled AA groups  - Actively practices coping skills through participation in the therapeutic community and adherence to program rules  - Reviews and completes assignments from individual treatment plan  - Assist patient development of understanding of their personal cycle of addiction and relapse triggers   Outcome: Progressing      Problem: Risk for Self Injury/Neglect  Goal: Treatment Goal: Remain safe during length of stay, learn and adopt new coping skills, and be free of self-injurious ideation, impulses and acts at the time of discharge  Outcome: Progressing      Problem: Depression  Goal: Treatment Goal: Demonstrate behavioral control of depressive symptoms, verbalize feelings of improved mood/affect, and adopt new coping skills prior to discharge  Outcome: Progressing      Problem: Anxiety  Goal: Anxiety is at manageable level  Interventions:  - Assess and monitor patient's anxiety level  - Monitor for signs and symptoms of anxiety both physical and emotional (heart palpitations, chest pain, shortness of breath, headaches, nausea, feeling jumpy, restlessness, irritable, apprehensive)  - Collaborate with interdisciplinary team and initiate plan and interventions as ordered    - [place] patient to unit/surroundings  - Explain treatment plan  - Encourage participation in care  - Encourage verbalization of concerns/fears  - Identify coping mechanisms  - Assist in developing anxiety-reducing skills  - Administer/offer alternative therapies  - Limit or eliminate stimulants   Outcome: Progressing      Problem: DISCHARGE PLANNING  Goal: Discharge to home or other facility with appropriate resources  INTERVENTIONS:  - Identify barriers to discharge w/patient and caregiver  - Arrange for needed discharge resources and transportation as appropriate  - Identify discharge learning needs (meds, wound care, etc )  - Arrange for interpretive services to assist at discharge as needed  - Refer to Case Management Department for coordinating discharge planning if the patient needs post-hospital services based on physician/advanced practitioner order or complex needs related to functional status, cognitive ability, or social support system   Outcome: Progressing      Problem: Ineffective Coping  Goal: Participates in unit activities  Interventions:  - Provide therapeutic environment   - Provide required programming   - Redirect inappropriate behaviors    Outcome: Progressing      Problem: SUBSTANCE USE/ABUSE  Goal: By discharge, will develop insight into their chemical dependency and sustain motivation to continue in recovery  INTERVENTIONS:  - Attends all daily group sessions and scheduled AA groups  - Actively practices coping skills through participation in the therapeutic community and adherence to program rules  - Reviews and completes assignments from individual treatment plan  - Assist patient development of understanding of their personal cycle of addiction and relapse triggers   Outcome: Progressing    Goal: By discharge, patient will have ongoing treatment plan addressing chemical dependency  INTERVENTIONS:  - Assist patient with resources and/or appointments for ongoing recovery based living   Outcome: Progressing

## 2017-11-04 NOTE — CONSULTS
Consultation - Neuropsychology/Psychology Department  Scott Solis 42 y o  male MRN: [id number]  Unit/Bed#: ZV7 764-01 Encounter: [id number]        BACKGROUND:  Scott Solis is a 42y o  year old male who was referred for a Neuropsychological Exam to assess cognitive functioning  History of Present Illness  Patient inhaled three full cans of Dust Off keyboard ; typically inhales once per week; not taking Abilify and Celexa  Physician Requesting Consult: Victorina Baldwin MD  Consults      Historical Information   Past Medical History:   Diagnosis Date    Cancer [facility])     Thyroid Cancer    Depression     Disease of thyroid gland     Inhalation of noxious substance     Psychiatric disorder     Severe episode of recurrent major depressive disorder, without psychotic features ([facility] [address] ) 11/1/2017     Past Surgical History:   Procedure Laterality Date    ARTHROSCOPY KNEE Right     IN THYROIDECTOMY N/A 1/23/2017    Procedure: TOTAL THYROIDECTOMY;  Surgeon: Harika Parmar MD;  [facility]: [facility];  Service: Surgical Oncology    TOE AMPUTATION Right 2000    traumatic amputation of middle/ 3rd toe     Social History   History   Alcohol Use No     History   Drug Use     Comment: abuses inhalatants        History   Smoking Status    Current Every Day Smoker   Smokeless Tobacco    Never Used     Comment:  quit January 2017- former smoker 20 years 1/2 pack/ day     Family History:   Family History   Problem Relation Age of Onset    Diabetes Mother     Cancer Father        Meds/Allergies   current meds:   Current Facility-Administered Medications   Medication Dose Route Frequency    acetaminophen (TYLENOL) tablet 650 mg  650 mg Oral Q6H PRN    aluminum-magnesium hydroxide-simethicone (MYLANTA) 200-200-20 mg/5 mL oral suspension 30 mL  30 mL Oral Q4H PRN    ARIPiprazole (ABILIFY) tablet 5 mg  5 mg Oral After Dinner    benztropine (COGENTIN) injection 1 mg  1 mg Intramuscular Q6H PRN    benztropine (COGENTIN) tablet 1 mg  1 mg Oral Q6H PRN    buPROPion (WELLBUTRIN SR) 12 hr tablet 100 mg  100 mg Oral Q12H ELIZABETH    gabapentin (NEURONTIN) capsule 100 mg  100 mg Oral TID    haloperidol (HALDOL) tablet 5 mg  5 mg Oral Q6H PRN    ibuprofen (MOTRIN) tablet 600 mg  600 mg Oral Q6H PRN    levothyroxine tablet 175 mcg  175 mcg Oral Early Morning    LORazepam (ATIVAN) tablet 1 mg  1 mg Oral Q6H PRN    magnesium hydroxide (MILK OF MAGNESIA) 400 mg/5 mL oral suspension 30 mL  30 mL Oral Daily PRN    mirtazapine (REMERON) tablet 15 mg  15 mg Oral HS    nicotine polacrilex (NICORETTE) gum 2 mg  2 mg Oral Q2H PRN    OLANZapine (ZyPREXA) IM injection 10 mg  10 mg Intramuscular BID PRN    risperiDONE (RisperDAL M-TABS) dispersible tablet 1 mg  1 mg Oral Q3H PRN    sodium chloride (OCEAN) 0 65 % nasal spray 1 spray  1 spray Each Nare Q2H PRN    traZODone (DESYREL) tablet 50 mg  50 mg Oral HS PRN       No Known Allergies    Imaging Studies:       Family and Social Support:   No Data Recorded     Behavioral Observations: Alert, unable to accurately state the day/week and day/month, cooperative; affect appeared constricted; admitted to some depression and situational anxiety; denied auditory and visual hallucinations; experiences visual hallucinations when inhaling  Patient endorsed feeling cognitively slower, decreased memory and attention and loss of interests  Cognitive Examination    General Cognitive Functioning  MMSE = 25/30 with deficits in orientation, working memory; [employer] of Information = Average    Attention/Concertration  Auditory Selective Attention = Average; Visual Attention/tracking = Average; Auditory Vigilance = Severely Impaired; Information Processing Speed = Borderline    Frontal Systems/Executive Functioning  Mental Flexibility/Cognitive Control = Average; Working Memory = Average;  Abstract Reasoning = Average; Novel Spatial Problem Solving = Average; Generative Ability = Average; Spatial Conceptualization = Average;     Language Functioning  Confrontation naming = Average; Phonemic Fluency = Average; Semantic Retrieval = Average; Comprehension of Complex Ideational Material = Average;  Praxis = WNL's; Repetition = WNL'S; Basic Reading = WNL's; Written Expression = WNL's; Following Commands = WNL's    Memory Functioning  Narrative Recall - Short Delay = Mild/Moderately Impaired; Long Delay Narrative Recall = Mild/Moderately Impaired;  Visual Free Recall = Low Average; Visual Recognition = Impaired    Visuo-Spatial Abilities  Visual Perception = Average; Visuo-Construction (Graphomotor BETH) = Average; Visuo-Construction (Blocks) = Average; Visuo-Construction ([employer]) = WNL's    Functional Knowledge Basic Math Calculation = Average    Emotional Functioning: Depressed mood     Summary/Impression: Results of Neuropsychological Exam revealed deficits in Auditory Memory, Information Processing Speed, Auditory Vigilance, and Visual Recognition  Compromised cognitive functioning may be related to inhalent abuse and /or mood disorder  Major Depressive Disorder, Recurrent and Mild Neurocognitive Disorder, Provisional     Recommendations:  Patient must refrain from use of inhalents and would benefit drug abuse counseling  Also, strongly encourage repeat Neuropsychological Exam in approximately 3 to 6 months to clarify the presence of continuing cognitive damage

## 2017-11-05 RX ORDER — GABAPENTIN 300 MG/1
300 CAPSULE ORAL 3 TIMES DAILY
Status: DISCONTINUED | OUTPATIENT
Start: 2017-11-05 | End: 2017-11-07 | Stop reason: HOSPADM

## 2017-11-05 RX ADMIN — BUPROPION HYDROCHLORIDE 150 MG: 150 TABLET, EXTENDED RELEASE ORAL at 22:11

## 2017-11-05 RX ADMIN — MIRTAZAPINE 15 MG: 15 TABLET, FILM COATED ORAL at 23:13

## 2017-11-05 RX ADMIN — GABAPENTIN 300 MG: 300 CAPSULE ORAL at 22:11

## 2017-11-05 RX ADMIN — TRAZODONE HYDROCHLORIDE 50 MG: 50 TABLET ORAL at 22:12

## 2017-11-05 RX ADMIN — GABAPENTIN 100 MG: 100 CAPSULE ORAL at 08:13

## 2017-11-05 RX ADMIN — GABAPENTIN 300 MG: 300 CAPSULE ORAL at 15:56

## 2017-11-05 RX ADMIN — ARIPIPRAZOLE 5 MG: 5 TABLET ORAL at 18:57

## 2017-11-05 RX ADMIN — BUPROPION HYDROCHLORIDE 150 MG: 150 TABLET, EXTENDED RELEASE ORAL at 08:13

## 2017-11-05 RX ADMIN — IBUPROFEN 600 MG: 600 TABLET, FILM COATED ORAL at 08:13

## 2017-11-05 RX ADMIN — LEVOTHYROXINE SODIUM 175 MCG: 125 TABLET ORAL at 06:04

## 2017-11-05 NOTE — PROGRESS NOTES
PT denies all s/s and stated he had a good day  Pt does state he is for D/C Monday but "roberto does not feel he is ready to leave " PT could not elaborate why he does not feel ready  Pt requested PRN trazodone for sleep  PT was given trazodone 50 mg PO

## 2017-11-05 NOTE — PROGRESS NOTES
Progress Note - Behavioral Health   Gudelia Hernandez 42 y o  male MRN: @MRN   Unit/Bed#: DN5 954-25 Encounter: [id number]        The patient was seen for continuing care and reviewed with staff  Report from staff regarding this patient received and discussed, and records reviewed prior to seeing this patient   Slightly more irritable because of chronic pain, less depressed but anxious  Tolerated the milieu well  Sleep improved appetite was normal     Patient remains anxious and frustrated today  Medication side effects: No   ROS: back pain    Mental Status Evaluation:    Appearance:  dressed appropriately, casually dressed   Behavior:  cooperative   Mood:  improved, anxious   Affect: constricted    Speech:  normal rate and volume, normal pitch   Language: appropriate   Thought Process:  normal   Associations: concrete associations   Thought Content:  normal   Perceptual Disturbances: no auditory hallucinations, no visual hallucinations, denies auditory hallucinations when asked, does not appear responding to internal stimuli   Risk Potential: Suicidal ideation - None  Homicidal ideation - None  Potential for aggression - No   Sensorium:  oriented to person, place and time/date   Memory:  recent and remote memory grossly intact   Consciousness:  alert and awake   Attention: attention span and concentration are normal   Intellect: normal   Fund of Knowledge: awareness of current events: yes   Insight:  improved   Judgment: improved   Muscle Tone: normal   Gait/Station: normal gait/station and normal balance   Motor Activity: no abnormal movements         Laboratory results:  I have personally reviewed all pertinent laboratory results  No results for input(s): HGBA1C, NA, K, CL, CO2, GLUCOSE, CREATININE, BUN, MG, PHOS in the last 72 hours  Invalid input(s): CA  No results for input(s): WBC, RBC, HGB, HCT, MCV, MCH, RDW, PLT in the last 72 hours    No results for input(s): CREATININE, BUN, NA, K, CL, CO2, GLUCOSE, PROT, ALT, AST, BILIDIR in the last 72 hours  Invalid input(s): CA, AKLPHOS  No results for input(s): ALKPHOS, AST, ALT, GGT, BILITOT, BILIDIR, ALBUMIN, INR, AMYLASE, LIPASE in the last 72 hours  No results for input(s): Dyke Perone in the last 72 hours  Invalid input(s): CKMB, PBNP  No results for input(s): CHOL, LDLDIRECT, HDL, TRIG in the last 72 hours  No results for input(s): CRP, SEDRATE, KLARISSA, HAV, HEPAIGM, HEPBIGM, HEPBCAB, HEPCAB in the last 72 hours  Invalid input(s): HEAG    CBC: No results for input(s): WBC, RBC, HGB, HCT, PLT in the last 72 hours  BMP: No results for input(s): NA, K, CL, CO2, BUN, GLU in the last 72 hours  Invalid input(s): CREA        Progress Toward Goals: improving gradually    Assessment/Plan   Principal Problem:    Severe episode of recurrent major depressive disorder, without psychotic features ([facility] [address] )  Active Problems:    Substance abuse    [facility]      Recommended Treatment:  Brief motivational therapy was again provided to deal was patient drug abuse pattern  Supportive therapy was provided for anxiety and mood instability related to chronic pain  The patient was receptive  The patient's sleep improved and no need of medication adjustment  Planned medication and treatment changes: All current active medications have been reviewed  Continue treatment with group therapy, milieu therapy, occupational therapy and medication management  Risks / Benefits of Treatment:    Risks, benefits, and possible side effects of medications explained to patient and patient verbalizes understanding and agreement for treatment  Counseling / Coordination of Care:    Patient's progress reviewed with nursing staff  Recent stressors discussed with patient  Coping with stress discussed with patient  Crisis/safety plan discussed with patient  Discharge plan discussed with patient        ** Please Note: This note has been constructed using a voice recognition system   **

## 2017-11-05 NOTE — PLAN OF CARE
Problem: SUBSTANCE USE/ABUSE  Goal: By discharge, will develop insight into their chemical dependency and sustain motivation to continue in recovery  INTERVENTIONS:  - Attends all daily group sessions and scheduled AA groups  - Actively practices coping skills through participation in the therapeutic community and adherence to program rules  - Reviews and completes assignments from individual treatment plan  - Assist patient development of understanding of their personal cycle of addiction and relapse triggers   Outcome: Progressing      Problem: Risk for Self Injury/Neglect  Goal: Treatment Goal: Remain safe during length of stay, learn and adopt new coping skills, and be free of self-injurious ideation, impulses and acts at the time of discharge  Outcome: Progressing      Problem: Depression  Goal: Treatment Goal: Demonstrate behavioral control of depressive symptoms, verbalize feelings of improved mood/affect, and adopt new coping skills prior to discharge  Outcome: Progressing      Problem: Anxiety  Goal: Anxiety is at manageable level  Interventions:  - Assess and monitor patient's anxiety level  - Monitor for signs and symptoms of anxiety both physical and emotional (heart palpitations, chest pain, shortness of breath, headaches, nausea, feeling jumpy, restlessness, irritable, apprehensive)  - Collaborate with interdisciplinary team and initiate plan and interventions as ordered    - [place] patient to unit/surroundings  - Explain treatment plan  - Encourage participation in care  - Encourage verbalization of concerns/fears  - Identify coping mechanisms  - Assist in developing anxiety-reducing skills  - Administer/offer alternative therapies  - Limit or eliminate stimulants   Outcome: Progressing      Problem: DISCHARGE PLANNING  Goal: Discharge to home or other facility with appropriate resources  INTERVENTIONS:  - Identify barriers to discharge w/patient and caregiver  - Arrange for needed discharge resources and transportation as appropriate  - Identify discharge learning needs (meds, wound care, etc )  - Arrange for interpretive services to assist at discharge as needed  - Refer to Case Management Department for coordinating discharge planning if the patient needs post-hospital services based on physician/advanced practitioner order or complex needs related to functional status, cognitive ability, or social support system   Outcome: Progressing      Problem: Ineffective Coping  Goal: Participates in unit activities  Interventions:  - Provide therapeutic environment   - Provide required programming   - Redirect inappropriate behaviors    Outcome: Progressing      Problem: SUBSTANCE USE/ABUSE  Goal: By discharge, will develop insight into their chemical dependency and sustain motivation to continue in recovery  INTERVENTIONS:  - Attends all daily group sessions and scheduled AA groups  - Actively practices coping skills through participation in the therapeutic community and adherence to program rules  - Reviews and completes assignments from individual treatment plan  - Assist patient development of understanding of their personal cycle of addiction and relapse triggers   Outcome: Progressing    Goal: By discharge, patient will have ongoing treatment plan addressing chemical dependency  INTERVENTIONS:  - Assist patient with resources and/or appointments for ongoing recovery based living   Outcome: Progressing

## 2017-11-06 RX ADMIN — LEVOTHYROXINE SODIUM 175 MCG: 125 TABLET ORAL at 05:53

## 2017-11-06 RX ADMIN — BUPROPION HYDROCHLORIDE 150 MG: 150 TABLET, EXTENDED RELEASE ORAL at 09:20

## 2017-11-06 RX ADMIN — GABAPENTIN 300 MG: 300 CAPSULE ORAL at 21:58

## 2017-11-06 RX ADMIN — TRAZODONE HYDROCHLORIDE 50 MG: 50 TABLET ORAL at 22:00

## 2017-11-06 RX ADMIN — GABAPENTIN 300 MG: 300 CAPSULE ORAL at 09:19

## 2017-11-06 RX ADMIN — ARIPIPRAZOLE 5 MG: 5 TABLET ORAL at 18:51

## 2017-11-06 RX ADMIN — GABAPENTIN 300 MG: 300 CAPSULE ORAL at 18:51

## 2017-11-06 RX ADMIN — BUPROPION HYDROCHLORIDE 150 MG: 150 TABLET, EXTENDED RELEASE ORAL at 21:58

## 2017-11-06 RX ADMIN — MIRTAZAPINE 15 MG: 15 TABLET, FILM COATED ORAL at 21:58

## 2017-11-06 NOTE — PROGRESS NOTES
Pt states he is unsure if he is ready to leave  Pt stated to Rn that it needs to be passed on he won't have transportation tomorrow to get home because his wife is very busy so Tuesday would be a better day  PT denies all s/s except feeling a little depressed   Pt is comfortable out in milieu

## 2017-11-06 NOTE — PLAN OF CARE
Anxiety     Anxiety is at manageable level Progressing        Depression     Treatment Goal: Demonstrate behavioral control of depressive symptoms, verbalize feelings of improved mood/affect, and adopt new coping skills prior to discharge Progressing        Risk for Self Injury/Neglect     Treatment Goal: Remain safe during length of stay, learn and adopt new coping skills, and be free of self-injurious ideation, impulses and acts at the time of discharge Progressing        SUBSTANCE USE/ABUSE     By discharge, will develop insight into their chemical dependency and sustain motivation to continue in recovery Progressing        SUBSTANCE USE/ABUSE     By discharge, will develop insight into their chemical dependency and sustain motivation to continue in recovery Progressing     By discharge, patient will have ongoing treatment plan addressing chemical dependency Progressing

## 2017-11-06 NOTE — PROGRESS NOTES
Pt presented with depressed affect, was scant with RN but social in the milieu this morning  Denies SI and HI

## 2017-11-06 NOTE — CASE MANAGEMENT
CM met with patient  Patient was still feeling depressed, anxious about being home by self at discharge  Reviewed after care plan and stated that this CM will speak with wife about concerns as well  Will plan for discharge tomorrow, at 4pm, when wife is able to pick patient up  This CM faxed paperwork to [employer], that patient had signed release for, for short term disability  CM will continue to monitor

## 2017-11-07 VITALS
WEIGHT: 260 LBS | TEMPERATURE: 98 F | HEART RATE: 90 BPM | SYSTOLIC BLOOD PRESSURE: 127 MMHG | RESPIRATION RATE: 16 BRPM | HEIGHT: 72 IN | BODY MASS INDEX: 35.21 KG/M2 | DIASTOLIC BLOOD PRESSURE: 86 MMHG

## 2017-11-07 LAB
EST. AVERAGE GLUCOSE BLD GHB EST-MCNC: 131 MG/DL
HBA1C MFR BLD: 6.2 % (ref 4.2–6.3)

## 2017-11-07 PROCEDURE — 83036 HEMOGLOBIN GLYCOSYLATED A1C: CPT | Performed by: PSYCHIATRY & NEUROLOGY

## 2017-11-07 RX ORDER — ARIPIPRAZOLE 5 MG/1
5 TABLET ORAL
Qty: 30 TABLET | Refills: 0 | Status: SHIPPED | OUTPATIENT
Start: 2017-11-07 | End: 2018-04-06 | Stop reason: ALTCHOICE

## 2017-11-07 RX ORDER — MIRTAZAPINE 15 MG/1
15 TABLET, FILM COATED ORAL
Qty: 30 TABLET | Refills: 0 | Status: SHIPPED | OUTPATIENT
Start: 2017-11-07 | End: 2018-07-20 | Stop reason: ALTCHOICE

## 2017-11-07 RX ORDER — LEVOTHYROXINE SODIUM 175 UG/1
175 TABLET ORAL DAILY
Qty: 30 TABLET | Refills: 0 | Status: SHIPPED | OUTPATIENT
Start: 2017-11-07 | End: 2018-05-25 | Stop reason: SDUPTHER

## 2017-11-07 RX ORDER — GABAPENTIN 300 MG/1
300 CAPSULE ORAL 3 TIMES DAILY
Qty: 90 CAPSULE | Refills: 0 | Status: SHIPPED | OUTPATIENT
Start: 2017-11-07 | End: 2018-04-06 | Stop reason: ALTCHOICE

## 2017-11-07 RX ORDER — BUPROPION HYDROCHLORIDE 300 MG/1
300 TABLET ORAL DAILY
Qty: 30 TABLET | Refills: 0 | Status: SHIPPED | OUTPATIENT
Start: 2017-11-07 | End: 2018-04-06 | Stop reason: ALTCHOICE

## 2017-11-07 RX ORDER — TRAZODONE HYDROCHLORIDE 50 MG/1
50 TABLET ORAL
Qty: 30 TABLET | Refills: 0 | Status: SHIPPED | OUTPATIENT
Start: 2017-11-07 | End: 2018-04-06 | Stop reason: ALTCHOICE

## 2017-11-07 RX ADMIN — ARIPIPRAZOLE 5 MG: 5 TABLET ORAL at 16:00

## 2017-11-07 RX ADMIN — BUPROPION HYDROCHLORIDE 150 MG: 150 TABLET, EXTENDED RELEASE ORAL at 08:19

## 2017-11-07 RX ADMIN — GABAPENTIN 300 MG: 300 CAPSULE ORAL at 08:19

## 2017-11-07 RX ADMIN — GABAPENTIN 300 MG: 300 CAPSULE ORAL at 16:00

## 2017-11-07 RX ADMIN — LEVOTHYROXINE SODIUM 175 MCG: 125 TABLET ORAL at 06:07

## 2017-11-07 NOTE — PLAN OF CARE
Problem: SUBSTANCE USE/ABUSE  Goal: By discharge, will develop insight into their chemical dependency and sustain motivation to continue in recovery  INTERVENTIONS:  - Attends all daily group sessions and scheduled AA groups  - Actively practices coping skills through participation in the therapeutic community and adherence to program rules  - Reviews and completes assignments from individual treatment plan  - Assist patient development of understanding of their personal cycle of addiction and relapse triggers   Outcome: Completed Date Met: 11/07/17      Problem: Risk for Self Injury/Neglect  Goal: Treatment Goal: Remain safe during length of stay, learn and adopt new coping skills, and be free of self-injurious ideation, impulses and acts at the time of discharge  Outcome: Completed Date Met: 11/07/17      Problem: Depression  Goal: Treatment Goal: Demonstrate behavioral control of depressive symptoms, verbalize feelings of improved mood/affect, and adopt new coping skills prior to discharge  Outcome: Completed Date Met: 11/07/17      Problem: Anxiety  Goal: Anxiety is at manageable level  Interventions:  - Assess and monitor patient's anxiety level  - Monitor for signs and symptoms of anxiety both physical and emotional (heart palpitations, chest pain, shortness of breath, headaches, nausea, feeling jumpy, restlessness, irritable, apprehensive)  - Collaborate with interdisciplinary team and initiate plan and interventions as ordered    - [place] patient to unit/surroundings  - Explain treatment plan  - Encourage participation in care  - Encourage verbalization of concerns/fears  - Identify coping mechanisms  - Assist in developing anxiety-reducing skills  - Administer/offer alternative therapies  - Limit or eliminate stimulants   Outcome: Completed Date Met: 11/07/17      Problem: DISCHARGE PLANNING  Goal: Discharge to home or other facility with appropriate resources  INTERVENTIONS:  - Identify barriers to discharge w/patient and caregiver  - Arrange for needed discharge resources and transportation as appropriate  - Identify discharge learning needs (meds, wound care, etc )  - Arrange for interpretive services to assist at discharge as needed  - Refer to Case Management Department for coordinating discharge planning if the patient needs post-hospital services based on physician/advanced practitioner order or complex needs related to functional status, cognitive ability, or social support system   Outcome: Completed Date Met: 11/07/17      Problem: Ineffective Coping  Goal: Participates in unit activities  Interventions:  - Provide therapeutic environment   - Provide required programming   - Redirect inappropriate behaviors    Outcome: Completed Date Met: 11/07/17      Problem: SUBSTANCE USE/ABUSE  Goal: By discharge, will develop insight into their chemical dependency and sustain motivation to continue in recovery  INTERVENTIONS:  - Attends all daily group sessions and scheduled AA groups  - Actively practices coping skills through participation in the therapeutic community and adherence to program rules  - Reviews and completes assignments from individual treatment plan  - Assist patient development of understanding of their personal cycle of addiction and relapse triggers   Outcome: Completed Date Met: 11/07/17    Goal: By discharge, patient will have ongoing treatment plan addressing chemical dependency  INTERVENTIONS:  - Assist patient with resources and/or appointments for ongoing recovery based living   Outcome: Completed Date Met: 11/07/17

## 2017-11-07 NOTE — PROGRESS NOTES
Progress Note - Behavioral Health   Kaleb Veliz 42 y o  male MRN: @MRN   Unit/Bed#: CY4 688-67 Encounter: [id number]        The patient was seen for continuing care and reviewed with staff  Report from staff regarding this patient received and discussed, and records reviewed prior to seeing this patient   The patient condition slowly improving he still feels anxious he is no longer depressed he feels that his range of motions is improving  He still has some contemplating of his recovery  He stated that he would approach his addiction by day after day approach  Tolerated milieu well no self-harming behavioral   The patient stated that his sleep was improved and appetite was normal        Patient remains anxious, appropriate, cooperative with milieu and cooperative with staff today      Medication side effects: No   ROS: no complaints    Mental Status Evaluation:    Appearance:  dressed appropriately, casually dressed   Behavior:  cooperative   Mood:  improved, anxious   Affect: appropriate, reactive, brighter, less constricted    Speech:  normal rate and volume   Language: appropriate   Thought Process:  normal   Associations: concrete associations   Thought Content:  normal   Perceptual Disturbances: no auditory hallucinations, no visual hallucinations, denies auditory hallucinations when asked, does not appear responding to internal stimuli   Risk Potential: Suicidal ideation - None  Homicidal ideation - None  Potential for aggression - No   Sensorium:  oriented to person, place and time   Memory:  recent and remote memory grossly intact   Consciousness:  alert and awake   Attention: attention span and concentration are normal   Intellect: normal   Fund of Knowledge: awareness of current events appropriate   Insight:  improved   Judgment: improved   Muscle Tone: normal   Gait/Station: normal gait/station and normal balance   Motor Activity: no abnormal movements         Laboratory results:  I have personally reviewed all pertinent laboratory results  Progress Toward Goals: improving    Assessment/Plan   Principal Problem:    Severe episode of recurrent major depressive disorder, without psychotic features ([facility] [address] )  Active Problems:    Substance abuse    Drug induced insomnia ([facility] [address] )      Recommended Treatment:  Will continue the present medication of his depression  Supportive therapy was provided for his anxiety  Motivational interview was provided to deal with his addictions  The patient was receptive  Will order lab to follow up his blood sugar level increase  Planned medication and treatment changes: All current active medications have been reviewed  Continue treatment with group therapy, milieu therapy, occupational therapy and medication management  Risks / Benefits of Treatment:    Risks, benefits, and possible side effects of medications explained to patient and patient verbalizes understanding and agreement for treatment  Counseling / Coordination of Care:    Patient's progress discussed with staff in treatment team meeting  Medication changes reviewed with staff in treatment team meeting  Medications, treatment progress and treatment plan reviewed with patient  Patient's diagnosis and treatment indicated reviewed with patient  Discussed with patient acceptance of mental illness diagnosis and need for ongoing treatment after discharge  Importance of follow up for substance abuse issues discussed with patient  Crisis/safety plan discussed with patient  Discharge plan discussed with patient  ** Please Note: This note has been constructed using a voice recognition system   **

## 2017-11-07 NOTE — DISCHARGE INSTRUCTIONS
Aripiprazole (By mouth)   Aripiprazole (ar-i-PIP-ra-zole)  Treats schizophrenia, bipolar disorder, depression, and Tourette syndrome  Also treats irritability associated with autism  Brand Name(s): Abilify   There may be other brand names for this medicine  When This Medicine Should Not Be Used: This medicine is not right for everyone  Do not use it if you had an allergic reaction to aripiprazole  How to Use This Medicine:   Liquid, Tablet, Dissolving Tablet  · Take your medicine as directed  Your dose may need to be changed several times to find what works best for you  · Tablet: Swallow whole  Do not break, crush, or chew it  · Disintegrating tablet: Make sure your hands are dry before you handle the disintegrating tablet  Peel back the foil from the blister pack, then remove the tablet  Do not push the tablet through the foil  Place the tablet in your mouth  After it has melted, swallow or take a drink of water  · This medicine should come with a Medication Guide  Ask your pharmacist for a copy if you do not have one  · Missed dose: Take a dose as soon as you remember  If it is almost time for your next dose, wait until then and take a regular dose  Do not take extra medicine to make up for a missed dose  · Store the medicine in a closed container at room temperature, away from heat, moisture, and direct light  Drugs and Foods to Avoid:   Ask your doctor or pharmacist before using any other medicine, including over-the-counter medicines, vitamins, and herbal products  · Some medicines can affect how aripiprazole works   Tell your doctor if you are using any of the following:   ¨ Carbamazepine, clarithromycin, fluoxetine, itraconazole, ketoconazole, paroxetine, quinidine, or rifampin  ¨ Benzodiazepine or sedative medicine (including lorazepam)  ¨ Blood pressure medicine  Warnings While Using This Medicine:   · Tell your doctor if you are pregnant or breastfeeding, or if you have diabetes, heart failure, heart or blood vessel disease, heart rhythm problems, high or low blood pressure, high cholesterol, or a history of seizures, heart attack or stroke  · For some children, teenagers, and young adults, this medicine may increase mental or emotional problems  This may lead to thoughts of suicide and violence  Talk with your doctor right away if you have any thoughts or behavior changes that concern you  Tell your doctor if you or anyone in your family has a history of bipolar disorder or suicide attempts  · This medicine may cause the following problems:   ¨ Neuroleptic malignant syndrome (NMS), a neurologic disorder than can be life-threatening  ¨ Tardive dyskinesia (muscle movements you cannot control)  ¨ Changes in blood sugar levels  ¨ Unusual changes in behavior, such as gambling urges, binge or compulsive eating, or compulsive shopping, or sexual urges  · This medicine may make you dizzy or drowsy, or may cause trouble with thinking or controlling body movements, which may lead to falls, fractures or other injuries  Do not drive or do anything that could be dangerous until you know how this medicine affects you  Stand or sit up slowly if you feel lightheaded or dizzy  · You may get overheated more easily while you are using this medicine  Be careful when you exercise or you are outside in hot or humid weather  Drink plenty of water to stay hydrated  · Tell your doctor if you have phenylketonuria (PKU)  The disintegrating tablet contains phenylalanine  · Do not stop using this medicine suddenly  Your doctor will need to slowly decrease your dose before you stop it completely  · Your doctor will do lab tests at regular visits to check on the effects of this medicine  Keep all appointments  · Keep all medicine out of the reach of children  Never share your medicine with anyone    Possible Side Effects While Using This Medicine:   Call your doctor right away if you notice any of these side effects:  · Allergic reaction: Itching or hives, swelling in your face or hands, swelling or tingling in your mouth or throat, chest tightness, trouble breathing  · Anxiety, irritability, nervousness, restlessness, or trouble sleeping  · Compulsive behavior or intense urges you cannot control  · Confusion, unusual behavior, depressed mood, or thoughts of hurting yourself or others  · Fever, chills, cough, sore throat, body aches  · Increased hunger or thirst, change in how much or how often you urinate  · Jerky muscle movements you cannot control (often in your face, tongue, or jaw)  · Lightheadedness, dizziness, fainting  · Seizures  · Sweating, uneven heartbeat, or muscle stiffness  · Unusual tiredness or sleepiness  If you notice these less serious side effects, talk with your doctor:   · Headache  · Nausea, vomiting, drooling  · Unusual weight gain  If you notice other side effects that you think are caused by this medicine, tell your doctor  Call your doctor for medical advice about side effects  You may report side effects to FDA at 8-004-FDA-0392  © 2017 [employer] [employer]  Information is for End User's use only and may not be sold, redistributed or otherwise used for commercial purposes  The above information is an  only  It is not intended as medical advice for individual conditions or treatments  Talk to your doctor, nurse or pharmacist before following any medical regimen to see if it is safe and effective for you  Bupropion (By mouth)   Bupropion ([id number])  Treats depression and aids in quitting smoking  Also prevents depression caused by seasonal affective disorder (SAD)  Brand Name(s): Aplenzin, Forfivo XL, Wellbutrin, Wellbutrin SR, Wellbutrin XL, Zyban   There may be other brand names for this medicine  When This Medicine Should Not Be Used: This medicine is not right for everyone   Do not use it if you had an allergic reaction to bupropion, or if you have seizures, anorexia, or bulimia  How to Use This Medicine:   Tablet, Long Acting Tablet  · Take your medicine as directed  Your dose may need to be changed several times to find what works best for you  · You may need to take Wellbutrin® for up to 4 weeks before you feel better  You may need to take Zyban® for 1 to 2 weeks before the date that you plan to stop smoking  · Swallow the tablet whole  Do not break, crush, divide, or chew it  · It is best to take Aplenzin® in the morning  · Do not take Wellbutrin® or Zyban® close to bedtime if you have trouble sleeping  · You may take this medicine with or without food  If you have nausea, it may help to take it with food  · If you take the extended-release tablet, part of the tablet may pass into your stools  This is normal and is nothing to worry about  · This medicine should come with a Medication Guide  Ask your pharmacist for a copy if you do not have one  · Missed dose: Skip the missed dose and go back to your regular dosing schedule  Never take extra medicine to make up for a missed dose  · Store the medicine in a closed container at room temperature, away from heat, moisture, and direct light  Drugs and Foods to Avoid:   Ask your doctor or pharmacist before using any other medicine, including over-the-counter medicines, vitamins, and herbal products  · Do not use this medicine and an MAO inhibitor (MAOI), such as linezolid or methylene blue injection, within 14 days of each other  Do not use Zyban® to quit smoking if you already take Aplenzin® or Wellbutrin® for depression, because they are the same medicine  · Some medicines can affect how bupropion works   Tell your doctor if you are using the following:   ¨ Amantadine, cimetidine, clopidogrel, cyclophosphamide, levodopa, nicotine patch, orphenadrine, tamoxifen, theophylline, thiotepa, ticlopidine  ¨ Beta blocker (such as metoprolol), insulin or diabetes medicine that you take by mouth, medicine for heart rhythm problems (propafenone, flecainide), medicine to treat HIV or AIDS (efavirenz, lopinavir, nelfinavir, ritonavir), medicine for seizures (carbamazepine, phenobarbital, phenytoin), other medicine for depression (desipramine, fluoxetine, imipramine, nortriptyline, paroxetine, sertraline), medicine to treat mental illness (haloperidol, risperidone, thioridazine), steroid medicine (hydrocortisone, methylprednisolone, prednisone, prednisolone, dexamethasone), or a blood thinner  · Limit alcohol, or do not drink alcohol at all while you are using this medicine  Warnings While Using This Medicine:   · Tell your doctor if you are pregnant or breastfeeding, or if you have kidney disease, liver disease, diabetes, glaucoma, heart disease, or high blood pressure  · Tell your doctor if you take barbiturates, benzodiazepines, antiseizure medicine, or sedatives, or if you recently stopped taking them  Tell your doctor if you have a history of drug addiction, or if you drink alcohol  · For some children, teenagers, and young adults, this medicine may increase mental or emotional problems  This may lead to thoughts of suicide and violence  Talk with your doctor right away if you have any thoughts or behavior changes that concern you  Tell your doctor if you or anyone in your family has a history of bipolar disorder or suicide attempts  · This medicine may cause the following problems:  ¨ An increased risk of seizures  ¨ Changes in mood or behavior  ¨ High blood pressure  ¨ Serious skin reactions  · This medicine may make you dizzy or drowsy  Do not drive or do anything that could be dangerous until you know how this medicine affects you  · Zyban® is only part of a complete program to help you quit smoking  You may still want to smoke at times  Have a plan to cope with these situations  · Do not stop using this medicine suddenly   Your doctor will need to slowly decrease your dose before you stop it completely  · Tell any doctor or dentist who treats you that you are using this medicine  This medicine may affect certain medical test results  · Your doctor will check your progress and the effects of this medicine at regular visits  Keep all appointments  · Keep all medicine out of the reach of children  Never share your medicine with anyone  Possible Side Effects While Using This Medicine:   Call your doctor right away if you notice any of these side effects:  · Allergic reaction: Itching or hives, swelling in your face or hands, swelling or tingling in your mouth or throat, chest tightness, trouble breathing  · Blistering, peeling, or red skin rash  · Chest pain, trouble breathing, fast, slow, or uneven heartbeat  · Muscle or joint pain, fever with rash  · Seeing or hearing things that are not there, feeling like people are against you  · Seizures or tremors  · Sudden increase in energy, racing thoughts, trouble sleeping  · Thoughts of hurting yourself, worsening depression, severe agitation or confusion  If you notice these less serious side effects, talk with your doctor:   · Dry mouth  · Eye pain, vision changes, seeing halos around lights  · Headache or dizziness  · Nausea, vomiting, constipation, diarrhea, gas, stomach pain  · Weight gain or loss  If you notice other side effects that you think are caused by this medicine, tell your doctor  Call your doctor for medical advice about side effects  You may report side effects to FDA at 2-519-FDA-8773  © 2017 [address] [employer] Information is for End User's use only and may not be sold, redistributed or otherwise used for commercial purposes  The above information is an  only  It is not intended as medical advice for individual conditions or treatments  Talk to your doctor, nurse or pharmacist before following any medical regimen to see if it is safe and effective for you

## 2017-11-07 NOTE — PROGRESS NOTES
Patient about the unit  Social with select peers  Denies symptoms and feels ready for discharge  Compliant with medications  No complaints

## 2017-11-07 NOTE — PROGRESS NOTES
Pt visible on unit  Social with peers  Playing cards and games and watching tv, appears comfortable  States feeling less depressed and anxious since admission and ready for discharge  Discussed OP follow-up and pt states he is going to continue with care  Compliant with meds and care

## 2017-11-07 NOTE — PROGRESS NOTES
Pt given evening meds while awaiting pick-up by wife  Pleasant and brighter  States ready for d/c and understands discharge instructions reviewed by discharge RN

## 2017-11-08 ENCOUNTER — ALLSCRIPTS OFFICE VISIT (OUTPATIENT)
Dept: OTHER | Facility: OTHER | Age: 37
End: 2017-11-08

## 2017-11-09 DIAGNOSIS — C73 MALIGNANT NEOPLASM OF THYROID GLAND (HCC): ICD-10-CM

## 2017-11-20 NOTE — DISCHARGE SUMMARY
Discharge Summary - [facility] 42 y o  male MRN: [id number]  Unit/Bed#: OS8 OVR Encounter: [id number]     Admission Date: 11/1/2017         Discharge Date: 11/7/2017      Attending Psychiatrist: ABIOLA Sosa     Reason for Admission/HPI:     Anne Harmon is a 42 y o  male with a history of depression versus bipolar disorder who was admitted to the inpatient psychiatric unit on a voluntary 201 commitment basis due to depression, anxiety, inability to care for self, inability to care for self because of mental illness and suicidal ideation      Symptoms prior to admission included worsening depression, depression, feeling depressed, suicidal ideation, hopelessness, helplessness, poor concentration, poor appetite, weight loss and difficulty sleeping  Onset of symptoms was gradual starting few weeks ago with gradually worsening course since that time  Stressors preceding admission included drug use problems       On initial evaluation after admission to the inpatient psychiatric unit the patient was tense and guarded, depressed and anxious     He stated that he stopped taking his medications Abilify and Celexa because he felt that " I feel like I have no personaluty on Celexa and Abilify"  He specifically mentioned that this medications make his emotions shallow  Lack of motivation, ost in tstess, hard to converation  However after he stopped taking his medications his depression slowly getting worse and he relapsed on inhaler  stating that he gets some wait to get out of reality quote  He endorsed some visual hallucinations while intoxicated but denied paranoid delusions or auditory hallucinations       The patient 6 stressors years ago the patient started to dated the same girlfriend he has was here now ago police because the patient relapsed and started to abuse inhalers     The patient stated that his depression worsened after he is thyroid gland was removed because of cancer  The patient stated that his doctors stated that he was cancer free     His previous medications were risperidone, Prozac, and Seroquel, the patient was not sure if those medication were working  Admitted to [facility] and was treated for about 5 days  Attempts Denied history of suicidal     Recently he developed insomnia was inability to fall and stay asleep         Psychiatric Review Of Systems:     sleep changes: decreased  appetite changes: decreased  weight changes: no  energy/anergy: decreased  interest/pleasure/anhedonia: decreased  somatic symptoms: yes  anxiety/panic: yes  tony: past mixed episodes  guilty/hopeless: yes  self injurious behavior/risky behavior: no  Suicidal ideation: yes, no plan  Homicidal ideation: no  Auditory hallucinations: no  Visual hallucinations: no  Other hallucinations: no  Delusional thinking: no  Eating disorder history: no  Obsessive/compulsive symptoms: no        Historical Information        Past Psychiatric History:      Past Inpatient Psychiatric Treatment:   One past inpatient psychiatric admission at [facility]  Past Outpatient Psychiatric Treatment:    Was in outpatient psychiatric treatment in the past with a psychiatrist   Past Suicide Attempts:               no  Past Violent Behavior:               no  Past Psychiatric Medication Trials:               Prozac, risperidone, Seroquel, Celexa, Abilify  Hospital Course:     Mr Abril Nguyen was admitted and all appropriate precautions were started  Pt was oriented to the unit  His treatment, including medication management and therapy was discussed with the patient, and he agreed  During the hospitalization the patient was encouraged to attend individual therapy, group therapy, milieu therapy and occupational therapy  Patient condition was initially depressed was problems with sleep but significantly improved after his  medications were started    Patient complains of higher whom dose of Abilify lead him to feel not himself and Abilify was decreased to 5 mg instead of 10  The patient was provided with a combination of Wellbutrin and Remeron to treat his severe depression and his condition slowly improved  Pt agreed to start his medication after discussion of potential benefits and side effects  Risks, benefits, and possible side effects of medications explained to patient including risk of parkinsonian symptoms, Tardive Dyskinesia and metabolic syndrome related to treatment with antipsychotic medications and risk of suicidality related to treatment with antidepressants  The patient verbalizes understanding and agreement for treatment  Bryant Barrios He also participated in therapy  Pt  sleep improved and appetite improved as well  During his  treatment at the Unit the patient did not have any episodes of self-harming or harming to others behaviors, was cooperative with the treatment plan  Tolerated medications without side effects  Vitals were stable  Denied any SI or HI  I discussed the medication regimen and possible side effects of the medications with the patient prior to discharge  At the time of discharge Mr Gallito Altamirano was tolerating psychiatric medications  Please see After Discharge Summary for a completed list of discharge medications  Safety plan was discussed and approved by the patient  He was not manic, depressed, angry, or confused or psychotic on a day of discharge and was accountable for his actions  I discussed outpatient follow up with the patient, which was arranged by the unit  upon discharge  Safety plan was discussed and approved by Mr Gallito Altamirano   Reviewed with the patient importance of compliance with medications and outpatient treatment after discharge  The patient was competent to understand of risks and benefits of his actions           Mental Status at time of Discharge:     Mental Status Evaluation:    Appearance:  dressed appropriately, casually dressed   Behavior: normal, cooperative   Mood:  improved, euthymic   Affect: appropriate, reactive    Speech:  normal rate and volume, normal pitch   Language: appropriate   Thought Process:  normal   Associations: concrete associations   Thought Content:  normal   Perceptual Disturbances: no auditory hallucinations, no visual hallucinations, denies auditory hallucinations when asked, does not appear responding to internal stimuli   Risk Potential: Suicidal ideation - None  Homicidal ideation - None  Potential for aggression - No   Sensorium:  oriented to person, place and time   Memory:  recent and remote memory grossly intact   Consciousness:  alert and awake   Attention: attention span and concentration are normal   Intellect: normal   Fund of Knowledge: awareness of current events appropriate   Insight:  normal and improved   Judgment: normal and improved   Muscle Tone: normal   Gait/Station: normal gait/station and normal balance   Motor Activity: no abnormal movements         Discharge Diagnosis:   Patient Active Problem List   Diagnosis    [facility]    Concussion with loss of consciousness of 30 minutes or less    Scalp laceration    Depression    Suicidal ideations    Severe episode of recurrent major depressive disorder, without psychotic features ([facility]    Substance abuse    Drug induced insomnia ([facility] )       Discharge Medications:  See after visit summary for reconciled discharge medications provided to patient and family  Discharge instructions/Information to patient and family:   See after visit summary for information provided to patient and family  Provisions for Follow-Up Care:  See after visit summary for information related to follow-up care and any pertinent home health orders  Discharge Statement   I spent 46 minutes discharging the patient  This time was spent on the day of discharge  I had direct contact with the patient on the day of discharge   Additional documentation is required if more than 30 minutes were spent on discharge  Portions of the record may have been created with voice recognition software  Occasional wrong word or "sound a like" substitutions may have occurred due to the inherent limitations of voice recognition software  Read the chart carefully and recognize, using context, where substitutions have occurred  There may be translation, syntax,  or grammatical errors  If you have any questions, please contact the dictating provider

## 2017-12-14 ENCOUNTER — ALLSCRIPTS OFFICE VISIT (OUTPATIENT)
Dept: OTHER | Facility: OTHER | Age: 37
End: 2017-12-14

## 2017-12-14 DIAGNOSIS — R03.0 ELEVATED BLOOD PRESSURE READING WITHOUT DIAGNOSIS OF HYPERTENSION: ICD-10-CM

## 2017-12-14 DIAGNOSIS — E55.9 VITAMIN D DEFICIENCY: ICD-10-CM
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
## 2017-12-21 ENCOUNTER — GENERIC CONVERSION - ENCOUNTER (OUTPATIENT)
Dept: OTHER | Facility: OTHER | Age: 37
End: 2017-12-21

## 2017-12-26 ENCOUNTER — TRANSCRIBE ORDERS (OUTPATIENT)
Dept: ADMINISTRATIVE | Facility: HOSPITAL | Age: 37
End: 2017-12-26

## 2017-12-26 DIAGNOSIS — R60.9 SWELLING: Primary | ICD-10-CM

## 2018-01-09 NOTE — RESULT NOTES
Discussion/Summary   TG level trending that is good, will continue to monitor, repeat in 6 months  TSH suppressed with normal free T4, for now continue current dose of Levothyroxine and repeat TSH and free T4 in 4 weeks  Verified Results  (1) T4, FREE 45IOY9250 10:07AM [employer] Order Number: [id number]     Test Name Result Flag Reference   T4,FREE 1 01 ng/dL  0 76-1 46     (1) TSH 20PMQ7195 10:07AM [employer] Order Number: [id number]     Test Name Result Flag Reference   TSH 0 075 uIU/mL L 0 358-3 740   - Patient Instructions: This bloodwork is non-fasting  Please drink two glasses of water morning of bloodwork  Patients undergoing fluorescein dye angiography may retain small amounts of fluorescein in the body for 48-72 hours post procedure  Samples containing fluorescein can produce falsely depressed TSH values  If the patient had this procedure,a specimen should be resubmitted post fluorescein clearance  (1) THYROGLOBULIN/QUANT W/ANTIBODY PANEL 56FKX1608 10:07AM [employer] Order Number: [id number]     Test Name Result Flag Reference   THYROGLOB AB <1 0 IU/mL  0 0 - 0 9   Thyroglobulin Antibody measured by [facility] Methodology    Performed at:  [address] [facility] [email or web address] [address]  [id number]  : Yola Morales MD, Phone:  [phone]   THYROGLOBULIN-YU 0 5 ng/mL L 1 4 - 29 2   According to the [facility] of Clinical Biochemistry, the  reference interval for Thyroglobulin (TG) should be related to  euthyroid patients and not for patients who underwent thyroidectomy  TG reference intervals for these patients depend on the residual  mass of the thyroid tissue left after surgery  Establishing a  post-operative baseline is recommended    The assay limit of quantitation is 0 1 ng/mL  Thyroglobulin measured by [facility] Immunometric Assay    Performed at:  [employer] [email or web address] [address] [id number]  : Charity Flowers MD, Phone:  [phone]

## 2018-01-10 NOTE — PSYCH
Behavioral Health Outpatient Intake    Referred By: Mark Schilling  Intake Questions: there are no developmental disabilities  the patient does not have a hearing impairment  the patient does not have an ICM or CTT  patient is not taking injectable psychiatric medications  Employment: The patient is employed full time at [employer]  Emergency Contact Information:   Emergency Contact: SELWYN FAM   Relationship to Patient: [employer]IR[employer]RIEND   Phone Number: [phone]         Insurance Subscriber: [employer]   Primary Insurance: [employer]   ID number: [id number]   Group number: [id number]         Presenting Problem (in patient's words): DEPRESSION  Previous Treatment: The patient has not been seen here in the past      Accepted as Patient   Violette Osuna 8/23/16 @ 4PM     Primary Care Physician:  [facility]       Signatures   Electronically signed by : Cassi Treviño, ; Aug 16 2016 10:15AM EST                       (Author)
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
## 2018-01-10 NOTE — MISCELLANEOUS
Message  spoke with pathologist today about his thyroid biopsy  came back as papillary thyroid carcinoma  spoke with patient and his wife regarding the results and I set up an appt for him for Friday Jan 6th at 10:30 am with Dr Isai Bhat ( Surgical oncologist)   patient has an MRI abdomen approved for Tuesday Jan 3rd, 2016  will f/u  - Dr Argelia Espinoza   Electronically signed by : Connor Lynne MD; Dec 30 2016  2:07PM EST                       (Author)
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
## 2018-01-11 NOTE — RESULT NOTES
Message   Start vitamin D 50,000 units once a week  Continue same levothyroxine  He has a residual thyroid cancer for which iodine treatment should help  let me know if there are any questions  Verified Results  (1) T4, FREE 08SSZ7720 02:02PM [facility] Order Number: [id number]     Test Name Result Flag Reference   T4,FREE 1 49 ng/dL H 0 76-1 46     (1) THYROGLOBULIN/QUANT W/ANTIBODY PANEL 82CWO3738 02:02PM [facility] Order Number: [id number]     Test Name Result Flag Reference   THYROGLOB AB <1 0 IU/mL  0 0 - 0 9   Thyroglobulin Antibody measured by [facility] Methodology    Performed at:  [address]  [id number]  : Chano Don MD, Phone:  [phone]   THYROGLOBULIN-YU 9 5 ng/mL  1 4 - 29 2   According to the [facility] of Clinical Biochemistry, the  reference interval for Thyroglobulin (TG) should be related to  euthyroid patients and not for patients who underwent thyroidectomy  TG reference intervals for these patients depend on the residual  mass of the thyroid tissue left after surgery  Establishing a  post-operative baseline is recommended  The assay limit of quantitation is 0 1 ng/mL  Thyroglobulin measured by [facility] Immunometric Assay    Performed at:  [address]  [id number]  : Chano Don MD, Phone:  [phone]     (1) TSH 17CYP3818 02:02PM [facility] Order Number: [id number]     Test Name Result Flag Reference   TSH 0 771 uIU/mL  0 358-3 740   - Patient Instructions: This bloodwork is non-fasting  Please drink two glasses of water morning of bloodwork  - Patient Instructions: This bloodwork is non-fasting  Please drink two glasses of water morning of bloodwork  Patients undergoing fluorescein dye angiography may retain small amounts of fluorescein in the body for 48-72 hours post procedure   Samples containing fluorescein can produce falsely depressed TSH values  If the patient had this procedure,a specimen should be resubmitted post fluorescein clearance  (1) PTH N-TERMINAL (INTACT) 67LVN0113 02:02PM Josee Abdul    Order Number: [id number]     Test Name Result Flag Reference   PARATHYROID HORMONE INTACT 23 2 pg/mL  14 0-72 0     (1) RENAL FUNCTION PANEL 82EFU2313 02:02PM Marcin Dora Order Number: [id number]     Test Name Result Flag Reference   ANION GAP (CALC) 10 mmol/L  4-13   ALBUMIN 4 1 g/dL  3 5-5 0   BLOOD UREA NITROGEN 21 mg/dL  5-25   CALCIUM 9 2 mg/dL  8 3-10 1   CHLORIDE 105 mmol/L  100-108   CARBON DIOXIDE 28 mmol/L  21-32   CREATININE 0 85 mg/dL  0 60-1 30   Standardized to IDMS reference method   GLUCOSE,RANDM 125 mg/dL     POTASSIUM 3 7 mmol/L  3 5-5 3   eGFR Non-African American      >60 0 ml/min/1 73sq [facility] Disease Education Program recommendations are as follows:  GFR calculation is accurate only with a steady state creatinine  Chronic Kidney disease less than 60 ml/min/1 73 sq  meters  Kidney failure less than 15 ml/min/1 73 sq  meters  SODIUM 143 mmol/L  136-145   PHOSPHORUS 3 5 mg/dL  2 7-4 5     (1) VITAMIN D 25-HYDROXY 37Cdh8710 02:02PM Josee Abdul    Order Number: [id number]     Test Name Result Flag Reference   VIT D 25-HYDROX 12 3 ng/mL L 30 0-100 0   This assay is a certified procedure of the CDC Vitamin D Standardization Certification Program (VDSCP)     Deficiency <20ng/ml   Insufficiency 20-30ng/ml   Sufficient  ng/ml     *Patients undergoing fluorescein dye angiography may retain small amounts of fluorescein in the body for 48-72 hours post procedure  Samples containing fluorescein can produce falsely elevated Vitamin D values  If the patient had this procedure, a specimen should be resubmitted post fluorescein clearance

## 2018-01-11 NOTE — RESULT NOTES
Discussion/Summary   A1c 5 7 pre-diabetes range, stay away from high carbs and increase physical activity  TG level same as previous under detectable level  TSH not at goal, increase Levothyroxine by extra 0 5 tab a week  Repeat TSH/free T4 in 6 weeks  Elevated LDL and triglycerides with low HDL, stay away from high carbs  Calcium and vitamin D low, add tums 500 mg 1 tab after lunch and 1 tab after dinner  Take D2 50,000 1 capsule weekly  Repeat renal function panel with next TSH/free T4  Verified Results  (1) HEMOGLOBIN A1C 07Oct2017 08:16AM Going    Order Number: [id number]     Test Name Result Flag Reference   HEMOGLOBIN A1C 5 7 %  4 2-6 3   EST  AVG  GLUCOSE 117 mg/dl       (1) LIPID PANEL, FASTING 67BZV0146 08:16AM Going    Order Number: [id number]     Test Name Result Flag Reference   CHOLESTEROL 222 mg/dL H    HDL,DIRECT 36 mg/dL L 40-60   Specimen collection should occur prior to Metamizole administration due to the potential for falsley depressed results  LDL CHOLESTEROL CALCULATED 142 mg/dL H 0-100   Triglyceride:        Normal <150 mg/dl   Borderline High 150-199 mg/dl   High 200-499 mg/dl   Very High >499 mg/dl      Cholesterol:       Desirable <200 mg/dl    Borderline High 200-239 mg/dl    High >239 mg/dl      HDL Cholesterol:       High>59 mg/dL    Low <41 mg/dL      This screening LDL is a calculated result  It does not have the accuracy of the Direct Measured LDL in the monitoring of patients with hyperlipidemia and/or statin therapy  Direct Measure LDL (ZDT821) must be ordered separately in these patients  TRIGLYCERIDES 222 mg/dL H <=150   Specimen collection should occur prior to N-Acetylcysteine or Metamizole administration due to the potential for falsely depressed results       (1) THYROGLOBULIN/QUANT W/ANTIBODY PANEL 54SDC1745 08:16AM [facility] [place] [facility] Number: [id number]     Test Name Result Flag Reference THYROGLOB AB <1 0 IU/mL  0 0 - 0 9   Thyroglobulin Antibody measured by [facility] Methodology    Performed at:  [address]  [id number]  : Dina Mak MD, Phone:  [phone]   THYROGLOBULIN-YU 0 5 ng/mL L 1 4 - 29 2   According to the [facility] of Clinical Biochemistry, the  reference interval for Thyroglobulin (TG) should be related to  euthyroid patients and not for patients who underwent thyroidectomy  TG reference intervals for these patients depend on the residual  mass of the thyroid tissue left after surgery  Establishing a  post-operative baseline is recommended  The assay limit of quantitation is 0 1 ng/mL  Thyroglobulin measured by [facility] Immunometric Assay    Performed at:  [address]  [id number]  : Dina Mak MD, Phone:  [phone]     (1) RENAL FUNCTION PANEL 44ELK8680 08:16AM Wilberto Holly    Order Number: [id number]     Test Name Result Flag Reference   ANION GAP (CALC) 6 mmol/L  4-13   ALBUMIN 3 8 g/dL  3 5-5 0   BLOOD UREA NITROGEN 13 mg/dL  5-25   CALCIUM 7 9 mg/dL L 8 3-10 1   CHLORIDE 108 mmol/L  100-108   CARBON DIOXIDE 25 mmol/L  21-32   CREATININE 0 82 mg/dL  0 60-1 30   Standardized to IDMS reference method   POTASSIUM 4 0 mmol/L  3 5-5 3   SODIUM 139 mmol/L  136-145   PHOSPHORUS 2 3 mg/dL L 2 7-4 5   eGFR 111 ml/min/1 73sq m     GLUCOSE FASTING 104 mg/dL H 65-99   Specimen collection should occur prior to Sulfasalazine administration due to the potential for falsely depressed results  Specimen collection should occur prior to Sulfapyridine administration due to the potential for falsely elevated results  Specimen collection should occur prior to Sulfasalazine administration due to the potential for falsely depressed results   Specimen collection should occur prior to Sulfapyridine administration due to the potential for falsely elevated results  National Kidney Disease Education Program recommendations are as follows:  GFR calculation is accurate only with a steady state creatinine  Chronic Kidney disease less than 60 ml/min/1 73 sq  meters  Kidney failure less than 15 ml/min/1 73 sq  meters

## 2018-01-11 NOTE — MISCELLANEOUS
Message  Return to work or school:   Kathya Gabriel is under my professional care  He was seen in my office on 6/29/16    He is not able to return to work until 7/4/16     Trial of unrestricted work as of 7/5/16          Signatures   Electronically signed by : Deepa Wright MD; Jun 29 2016 12:14PM EST                       (Author)

## 2018-01-12 VITALS
RESPIRATION RATE: 12 BRPM | WEIGHT: 242.13 LBS | TEMPERATURE: 97.7 F | HEART RATE: 68 BPM | HEIGHT: 70 IN | SYSTOLIC BLOOD PRESSURE: 124 MMHG | DIASTOLIC BLOOD PRESSURE: 68 MMHG | BODY MASS INDEX: 34.66 KG/M2

## 2018-01-12 NOTE — MISCELLANEOUS
Message  Return to work or school:   Kristin Gramajo is under my professional care  He was seen in my office on 12/23/2016   He is able to return to work on  01/09/2017      Due to [address] and to follow up with his thyroid issues -doctor's order patient is to return back to work on 1/9/2017     Dr Jonnathan Karimi MD       Signatures   Electronically signed by : Jonnathan Karimi MD; Dec 29 2016  1:16PM EST                       (Author)    Electronically signed by : Jonnathan Karimi MD; Dec 30 2016  1:28PM EST                       (Author)

## 2018-01-12 NOTE — MISCELLANEOUS
Message  Return to work or school:   Trina Michaud is under my professional care  He was seen in my office on January 27  Please excuse Madalyn Marino from work 1/23-2/6/2017  Dr Cheng Overton  Signatures   Electronically signed by :  Qasim Olivas, ; Jan 27 2017 11:28AM EST                       (Author)

## 2018-01-12 NOTE — RESULT NOTES
Verified Results  (1) CBC/PLT/DIFF [id number] 09:44AM Hasmukh Gonzalez Order Number: [id number]     Test Name Result Flag Reference   WBC COUNT 7 16 Thousand/uL  4 31-10 16   RBC COUNT 5 85 Million/uL H 3 88-5 62   HEMOGLOBIN 17 4 g/dL H 12 0-17 0   HEMATOCRIT 50 8 % H 36 5-49 3   MCV 87 fL  82-98   MCH 29 7 pg  26 8-34 3   MCHC 34 3 g/dL  31 4-37 4   RDW 12 0 %  11 6-15 1   MPV 10 0 fL  8 9-12 7   PLATELET COUNT 725 Thousands/uL  149-390   nRBC AUTOMATED 0 /100 WBCs     NEUTROPHILS RELATIVE PERCENT 60 %  43-75   LYMPHOCYTES RELATIVE PERCENT 31 %  14-44   MONOCYTES RELATIVE PERCENT 5 %  4-12   EOSINOPHILS RELATIVE PERCENT 4 %  0-6   BASOPHILS RELATIVE PERCENT 0 %  0-1   NEUTROPHILS ABSOLUTE COUNT 4 21 Thousands/?L  1 85-7 62   LYMPHOCYTES ABSOLUTE COUNT 2 22 Thousands/?L  0 60-4 47   MONOCYTES ABSOLUTE COUNT 0 37 Thousand/?L  0 17-1 22   EOSINOPHILS ABSOLUTE COUNT 0 28 Thousand/?L  0 00-0 61   BASOPHILS ABSOLUTE COUNT 0 02 Thousands/?L  0 00-0 10   - Patient Instructions: This bloodwork is non-fasting  Please drink two glasses of water morning of bloodwork  - Patient Instructions: This bloodwork is non-fasting  Please drink two glasses of water morning of bloodwork  (1) COMPREHENSIVE METABOLIC PANEL [id number] 98:81XO Hasmukh Gonzalez Order Number: [id number]     Test Name Result Flag Reference   GLUCOSE,RANDM 104 mg/dL     If the patient is fasting, the ADA then defines impaired fasting glucose as > 100 mg/dL and diabetes as > or equal to 123 mg/dL     SODIUM 138 mmol/L  136-145   POTASSIUM 4 1 mmol/L  3 5-5 3   CHLORIDE 105 mmol/L  100-108   CARBON DIOXIDE 28 mmol/L  21-32   ANION GAP (CALC) 5 mmol/L  4-13   BLOOD UREA NITROGEN 19 mg/dL  5-25   CREATININE 0 84 mg/dL  0 60-1 30   Standardized to IDMS reference method   CALCIUM 9 2 mg/dL  8 3-10 1   BILI, TOTAL 0 40 mg/dL  0 20-1 00   ALK PHOSPHATAS 104 U/L     ALT (SGPT) 50 U/L  12-78   AST(SGOT) 15 U/L  5-45   ALBUMIN 4 4 g/dL  3 5-5 0   TOTAL PROTEIN 7 7 g/dL  6 4-8 2   eGFR Non-African American      >60 0 ml/min/1 73sq m   [facility] Disease Education Program recommendations are as follows:  GFR calculation is accurate only with a steady state creatinine  Chronic Kidney disease less than 60 ml/min/1 73 sq  meters  Kidney failure less than 15 ml/min/1 73 sq  meters  (1) TSH WITH FT4 REFLEX [id number] 09:44AM Flores Gonzalez Order Number: [id number]     Test Name Result Flag Reference   TSH 3 050 uIU/mL  0 358-3 740   Patients undergoing fluorescein dye angiography may retain small amounts of fluorescein in the body for 48-72 hours post procedure  Samples containing fluorescein can produce falsely depressed TSH values  If the patient had this procedure,a specimen should be resubmitted post fluorescein clearance  (1) CALCIUM IONIZED [id number] 09:44AM Flores Gonzalez Order Number: [id number]     Test Name Result Flag Reference   CALCIUM IONIZED 1 19 mmol/L  1 12-1 32       Discussion/Summary   BLOOD WORK CAME BACK ALL WITHIN THE NORMAL LIMIT    - DR GONZALEZ

## 2018-01-13 VITALS
HEART RATE: 84 BPM | WEIGHT: 261 LBS | HEIGHT: 70 IN | BODY MASS INDEX: 37.37 KG/M2 | SYSTOLIC BLOOD PRESSURE: 118 MMHG | DIASTOLIC BLOOD PRESSURE: 90 MMHG

## 2018-01-13 VITALS
SYSTOLIC BLOOD PRESSURE: 110 MMHG | BODY MASS INDEX: 35.07 KG/M2 | DIASTOLIC BLOOD PRESSURE: 78 MMHG | HEART RATE: 98 BPM | WEIGHT: 245 LBS | OXYGEN SATURATION: 99 % | RESPIRATION RATE: 16 BRPM | HEIGHT: 70 IN | TEMPERATURE: 99.5 F

## 2018-01-13 VITALS
DIASTOLIC BLOOD PRESSURE: 80 MMHG | RESPIRATION RATE: 18 BRPM | WEIGHT: 254.13 LBS | BODY MASS INDEX: 36.15 KG/M2 | SYSTOLIC BLOOD PRESSURE: 112 MMHG | HEART RATE: 100 BPM

## 2018-01-13 VITALS
HEART RATE: 80 BPM | BODY MASS INDEX: 37.29 KG/M2 | SYSTOLIC BLOOD PRESSURE: 122 MMHG | DIASTOLIC BLOOD PRESSURE: 98 MMHG | WEIGHT: 262.13 LBS | RESPIRATION RATE: 18 BRPM

## 2018-01-13 VITALS
HEART RATE: 105 BPM | RESPIRATION RATE: 16 BRPM | TEMPERATURE: 97.2 F | OXYGEN SATURATION: 95 % | HEIGHT: 70 IN | WEIGHT: 238 LBS | SYSTOLIC BLOOD PRESSURE: 120 MMHG | DIASTOLIC BLOOD PRESSURE: 82 MMHG | BODY MASS INDEX: 34.07 KG/M2

## 2018-01-13 VITALS
RESPIRATION RATE: 16 BRPM | HEART RATE: 88 BPM | BODY MASS INDEX: 34.89 KG/M2 | DIASTOLIC BLOOD PRESSURE: 98 MMHG | TEMPERATURE: 98.5 F | WEIGHT: 257.25 LBS | SYSTOLIC BLOOD PRESSURE: 124 MMHG

## 2018-01-13 VITALS
OXYGEN SATURATION: 97 % | WEIGHT: 244.38 LBS | RESPIRATION RATE: 18 BRPM | BODY MASS INDEX: 33.14 KG/M2 | SYSTOLIC BLOOD PRESSURE: 124 MMHG | DIASTOLIC BLOOD PRESSURE: 90 MMHG | HEART RATE: 109 BPM

## 2018-01-13 NOTE — RESULT NOTES
Verified Results  [address] [id number] 03:04PM Quirino Miranda Order Number: [id number]    - Patient Instructions: To schedule this appointment, please contact Central Scheduling at [phone]  Test Name Result Flag Reference   US HEAD NECK LYMPH NODE [address] (Report)     NECK ULTRASOUND     INDICATION:  History of thyroid cancer  COMPARISON: Ultrasound neck 1/11/2017     FINDINGS:     Ultrasound of the thyroidectomy bed and cervical lymph node chains was performed with a high frequency linear transducer  There is no suspicion of recurrent mass in the thyroidectomy bed  Lymph nodes maintain normal morphologic contour, echogenicity and short axis dimensions of less than 0 7 cm  No evidence for microcalcification or focal nodularity  IMPRESSION:     No evidence of recurrent or metastatic disease         Workstation performed: [id number]     Signed by:   Rafi Banda MD   9/9/17

## 2018-01-13 NOTE — PROCEDURES
Procedures by LINUS Barroso  at 3/10/2017 11:24 AM      Author:  LINUS Barroso Service:  Trauma Author Type:  Nurse Practitioner    Filed:  3/10/2017 11:28 AM Date of Service:  3/10/2017 11:24 AM Status:  Attested    :  LINUS Barroso (Nurse Practitioner)  Cosigner:  Ly Hurtado MD at 3/10/2017 11:41 AM      Procedure Orders:       1  LACERATION REPAIR [[id number]] ordered by LINUS Barroso at 03/10/17 1124                 Post-procedure Diagnoses:       1  Scalp laceration, initial encounter [S01 01XA]              Attestation signed by Ly Hurtado MD at 3/10/2017 11:41 AM           I was present and supervised all critical elements of this procedure  I ensured full compliance with sterile procedure was followed  Comments:  No complications                                           Laceration Repair  Date/Time: 3/10/2017 11:24 AM  Performed by: Zeina Short  Authorized by: Zeina Short     Patient location:  ED  Other Assisting Provider: No     Consent:     Consent obtained:  Verbal    Consent given by:  Patient    Risks discussed:  Infection, pain and poor cosmetic result    Alternatives discussed:  No treatment  Universal protocol:     Procedure explained and questions answered to patient or proxy's satisfaction: yes      Patient identity confirmed:  Verbally with patient, arm band and hospital-assigned identification number  Anesthesia (see MAR for exact dosages):      Anesthesia method:  None (local infiltration offered to patient who declined)  Laceration details:     Location:  Scalp    Scalp location:  R temporal    Length (cm) of Repair:  2 5  Repair type:     Repair type:  Simple  Treatment:     Area cleansed with:  Water    Amount of cleaning:  Standard    Irrigation solution:  Sterile water    Irrigation volume:  300 ml    Irrigation method:  Pressure wash    Visualized foreign bodies/material removed: no    Skin repair:     Repair method:  Staples    Number of staples:  5  Approximation:     Approximation:  Close    Approximation difficulty:  Simple    Vermilion border: well-aligned    Post-procedure details:     Dressing:  Open (no dressing)    Patient tolerance of procedure: Tolerated well, no immediate complications  Comments:      5 staples to right posterior scalp laceration  Local infiltration with lidocaine offered but patient declined  Pt consent acquired prior to procedure initiation  Laceration was washed out with 300 ml sterile water and there was no evidence of foreign  objects  Pt tolerated procedure well  No active bleeding therefore site was left WILLIAM                        Received for:Carlota BARRIGA  Mar 10 2017 11:41AM [facility] Standard Time

## 2018-01-13 NOTE — RESULT NOTES
Verified Results  * XR KNEE 4+ VIEW LEFT [id number] 08:55AM Ricardo Gonzalez Suleman Order Number: [id number]     Test Name Result Flag Reference   XR KNEE 4+ VW LEFT (Report)     LEFT KNEE     INDICATION: Left knee pain  COMPARISON: None     VIEWS: 4; 4 images     FINDINGS:     There is no acute fracture or dislocation  Moderate size joint effusion  No degenerative changes  No lytic or blastic lesions are seen  Soft tissues are unremarkable  IMPRESSION:     No acute osseous abnormality  Moderate size joint effusion  ##sigslh##sigslh       Workstation performed: [id number]     Signed by:   Nora Bonilla MD   8/5/16     *US BREAST LEFT LIMITED (DIAGNOSTIC) 07Zkh5052 07:49AM Ricardo Gonzalez Order Number: [id number]    - Patient Instructions: To schedule this appointment, please contact Central Scheduling at [phone]  Test Name Result Flag Reference   US BREAST LEFT LIMITED (Report)     US Breast Left Limited: August 5, 2016 - Check In #: [de-identified]   Technologist: Rosa Bravo RDMS   No prior studies available for comparison  Targeted ultrasound of left was performed with a high frequency    linear transducer  There is no sonographic evidence for cystic or   solid mass lesion or suspicious foci of acoustic attenuation  There is fatty tissue throughout  No evidence for gynecomastia    demonstrated  No suspicious hypoechoic mass or architectural    distortion to suggest malignancy  ASSESSMENT: BiRad:1 - Negative     Recommendation:   Clinical management  Transcription Location: [facility] 98: [id number]     Risk Value(s):   Myriad Table: 1 5%   Signed by:   Shobha Gallardo MD   8/5/16       Discussion/Summary   left knee X ray showed moderate amount fluid in left knee joint   follow up with orthopedic as directed during the visit     - Dr Trace George   Electronically signed by : Harris Barraza MD; Aug  8 2016  7:50AM EST (Author)

## 2018-01-13 NOTE — RESULT NOTES
Message   there is probably residual thyroid tissue left which picked up iodine, will explain all results and plan of care in coming office visit     Verified Results  [facility] [id number] 07:17AM Garland Alexander     Test Name Result Flag Reference   NM THYROID CANCER METASTATIC SCAN WHOLE BODY (Report)     POST I-131 THERAPY WHOLE BODY TUMOR LOCALIZATION SCAN     INDICATION: Thyroid cancer     COMPARISON: 2/16/2017     FINDINGS:      Whole body images demonstrate at least one and possibly 2 new foci of activity in the thoracic inlet level, either residual thyroid tissue or deon activity  Otherwise no new distant metastases visualized  Physiologic bowel uptake is noted  IMPRESSION:   At least one, and possibly 2 new foci of activity in the thoracic inlet level, either residual thyroid tissue or deon activity  Workstation performed: [id number]     Signed by:   Karthikeyan Pastrana MD   2/22/17   Post therapy WBS  Pt rec'd 131 0 mCi 131 I on 2/16/17 for T3N1 PTC  Stimulated TG = 5 0; VLADIMIR = <1 0 on 2 16/17   He will f/u with Dr Alka Squires

## 2018-01-13 NOTE — RESULT NOTES
Verified Results  US THYROID [id number] 11:27AM Brittney Dobson Order Number: [id number]    - Patient Instructions: To schedule this appointment, please contact Central Scheduling at [phone]  Test Name Result Flag Reference   US THYROID (Report)     THYROID ULTRASOUND     INDICATION: Follow-up nodule on CT     COMPARISON: CT from 12/14/2016     TECHNIQUE:  Ultrasound of the thyroid was performed with a high frequency linear transducer in transverse and sagittal planes including volumetric imaging sweeps as well as traditional still imaging technique  FINDINGS:   Normal homogeneous smooth echotexture  Right gland: 1 1 x 1 7 x 5 1 cm  No dominant nodules  Left gland: 1 2 x 1 2 x 4 7 cm  No dominant nodules  Isthmus: The isthmus is 1 0 cm in AP dimension  Isthmus nodule measuring 1 2 x 2 0 x 1 9 cm  Solid hypoechoic nodule with microcalcification  No prior ultrasound for comparison  HIGH SUSPICION PATTERN (estimated risk of malignancy > 70-90%) FNA recommended at >/= 1 0 cm  Along the superior aspect of the isthmus is a 1 5 x 2 5 x 2 8 cm cystic structure with low-level internal echoes and solid echogenicity along the wall  While vascularity cannot be detected within the solid-appearing portion, an enhancing nodule was seen   on the previous CT  IMPRESSION:      1  Solid nodule with microcalcification within the isthmus  This is a high suspicion nodule and fine-needle aspiration is recommended  2  Cystic nodule along the superior aspect of the isthmus with echogenic solid portion  Differential includes both exophytic thyroid nodule and thyroglossal duct cyst  Given the presence of a solid portion which appear to enhance on prior CT,    fine-needle aspiration of the solid portion is recommended       ##sigslh##sigslh     ##fuslh01##fuslh01             Workstation performed: [id number]     Signed by:   Bernadine Pond MD   12/23/16       Plan  Left thyroid nodule    · Biopsy Thyroid FNA Using Ultrasound Guidance, [id number]; Status:Active;  Requested  for:45Gvi3730;     Discussion/Summary   spoke with the patient    will schedule him for fine needle biopsy of thyroid   - Dr Scout Carter   Electronically signed by : Nicole Hope MD; Dec 23 2016  1:35PM EST                       (Author)

## 2018-01-13 NOTE — RESULT NOTES
Verified Results  (1) CBC/PLT/DIFF [id number] 12:38PM Roz Gonzalez    Order Number: [id number]   Order Number: [id number]     Test Name Result Flag Reference   WBC COUNT 6 68 Thousand/uL  4 31-10 16   RBC COUNT 5 56 Million/uL  3 88-5 62   HEMOGLOBIN 16 3 g/dL  12 0-17 0   HEMATOCRIT 49 2 %  36 5-49 3   MCV 89 fL  82-98   MCH 29 3 pg  26 8-34 3   MCHC 33 1 g/dL  31 4-37 4   RDW 13 7 %  11 6-15 1   MPV 9 2 fL  8 9-12 7   PLATELET COUNT 527 Thousands/uL  149-390   NEUTROPHILS RELATIVE PERCENT 64 %  43-75   LYMPHOCYTES RELATIVE PERCENT 22 %  14-44   MONOCYTES RELATIVE PERCENT 12 %  4-12   EOSINOPHILS RELATIVE PERCENT 2 %  0-6   BASOPHILS RELATIVE PERCENT 0 %  0-1   NEUTROPHILS ABSOLUTE COUNT 4 33 Thousands/?L  1 85-7 62   LYMPHOCYTES ABSOLUTE COUNT 1 45 Thousands/?L  0 60-4 47   MONOCYTES ABSOLUTE COUNT 0 77 Thousand/?L  0 17-1 22   EOSINOPHILS ABSOLUTE COUNT 0 11 Thousand/?L  0 00-0 61   BASOPHILS ABSOLUTE COUNT 0 02 Thousands/?L  0 00-0 10     (1) COMPREHENSIVE METABOLIC PANEL [id number] 32:02LX Roz Gonzalez    Order Number: [id number]   Order Number: [id number] Order Number: [id number]     Test Name Result Flag Reference   GLUCOSE,RANDM 109 mg/dL     If the patient is fasting, the ADA then defines impaired fasting glucose as > 100 mg/dL and diabetes as > or equal to 123 mg/dL     SODIUM 138 mmol/L  136-145   POTASSIUM 4 1 mmol/L  3 5-5 3   CHLORIDE 101 mmol/L  100-108   CARBON DIOXIDE 30 mmol/L  21-32   ANION GAP (CALC) 7 mmol/L  4-13   BLOOD UREA NITROGEN 15 mg/dL  5-25   CREATININE 1 00 mg/dL  0 60-1 30   Standardized to IDMS reference method   CALCIUM 8 5 mg/dL  8 3-10 1   BILI, TOTAL 0 30 mg/dL  0 20-1 00   ALK PHOSPHATAS 51 U/L     ALT (SGPT) 55 U/L  12-78   AST(SGOT) 30 U/L  5-45   ALBUMIN 3 6 g/dL  3 5-5 0   TOTAL PROTEIN 7 4 g/dL  6 4-8 2   eGFR Non-African American      >60 0 ml/min/1 73sq [facility] [employer] Disease Education Program recommendations are as follows:  GFR calculation is accurate only with a steady state creatinine  Chronic Kidney disease less than 60 ml/min/1 73 sq  meters  Kidney failure less than 15 ml/min/1 73 sq  meters  (1) TSH WITH FT4 REFLEX 34EMR2047 12:38PM Roz Gonzalez    Order Number: [id number]  TW Order Number: [id number] Order Number: [id number]     Test Name Result Flag Reference   TSH 1 058 uIU/mL  0 358-3 740       Discussion/Summary   white blood cell count went down to normal range   thyroid test, kidney and liver function all normal   - Dr Mary Goff   Electronically signed by : Sary Lewis MD; May  4 2016  1:28PM EST                       (Author)
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
## 2018-01-14 VITALS
WEIGHT: 247 LBS | RESPIRATION RATE: 18 BRPM | HEART RATE: 87 BPM | BODY MASS INDEX: 33.5 KG/M2 | DIASTOLIC BLOOD PRESSURE: 88 MMHG | SYSTOLIC BLOOD PRESSURE: 118 MMHG

## 2018-01-14 VITALS
DIASTOLIC BLOOD PRESSURE: 92 MMHG | BODY MASS INDEX: 34.29 KG/M2 | HEIGHT: 70 IN | SYSTOLIC BLOOD PRESSURE: 118 MMHG | HEART RATE: 116 BPM | WEIGHT: 239.5 LBS

## 2018-01-14 VITALS
WEIGHT: 256.13 LBS | HEART RATE: 80 BPM | HEIGHT: 70 IN | BODY MASS INDEX: 36.67 KG/M2 | SYSTOLIC BLOOD PRESSURE: 112 MMHG | DIASTOLIC BLOOD PRESSURE: 72 MMHG

## 2018-01-14 VITALS
RESPIRATION RATE: 18 BRPM | SYSTOLIC BLOOD PRESSURE: 126 MMHG | WEIGHT: 248.13 LBS | BODY MASS INDEX: 35.3 KG/M2 | DIASTOLIC BLOOD PRESSURE: 76 MMHG | HEART RATE: 86 BPM
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
## 2018-01-15 NOTE — RESULT NOTES
Discussion/Summary   I spoke to Rich Subramanian his wife and explained the findings  We will do a CAT scan of the abdomen to rule out any causes UNILATERAL varicosities  He is also asked to [facility] Urologists for these findings  Verified Results  [address] [id number] 03:40PM Curt Botello Order Number: [id number]    - Patient Instructions: To schedule this appointment, please contact Central Scheduling at [phone]  Test Name Result Flag Reference   US SCROTUM AND TESTICLES (Report)     SCROTAL ULTRASOUND     INDICATION: Right testicular lump  COMPARISON: None  TECHNIQUE:  Ultrasound the scrotal contents was performed with a high frequency linear transducer utilizing volumetric sweep imaging as well as standard still image techniques  Imaging performed in longitudinal and transverse orientation  Color and    spectral Doppler evaluation also performed bilaterally  FINDINGS:     TESTES:    Testes are symmetric and normal in size  RIGHT testis = 3 8 x 1 7 x 2 5 cm    Normal contour with homogeneous smooth echotexture  No intratesticular mass lesion or calcifications  LEFT testis = 2 7 x 3 9 x 1 7 cm   Normal contour with homogeneous smooth echotexture  No intratesticular mass lesion or calcifications  Doppler flow within both testes is present and symmetric  EPIDIDYMIDES:    Normal Size  Doppler ultrasound demonstrates normal blood flow  There is a 8 x 6 x 6 mm right epididymal head cysts  There is a 3 x 5 x 3 mm left epididymal cyst      HYDROCELE: No significant fluid present  VARICOCELE: Incidental note of a right-sided varicocele  SCROTUM: Scrotal thickness and appearance within normal limits  No evidence for extratesticular mass or hernia demonstrated  IMPRESSION:   1  Right epididymal cyst measuring 8 x 6 x 6 mm corresponding to the area of palpable concern  There are small left epididymal cysts     2  No intratesticular mass or evidence of acute testicular torsion  3  Right-sided varicocele  Given the isolated finding of a right-sided varicocele further evaluation with a CT abdomen and pelvis with intravenous contrast is recommended to exclude an intra-abdominal lesion  ##sigslh##sigslh       Workstation performed: [id number]     Signed by:   Terri Don MD   5/27/17       Plan  Varicocele present on ultrasound of scrotum    · CT ABDOMEN PELVIS W WO CONTRAST; Status:Need Information - Financial  Authorization;  Requested HOS:[id number];

## 2018-01-15 NOTE — MISCELLANEOUS
Message  Return to work or school:   Daniel Mckeon is under my professional care  He was seen in my office on 11/08/2017 11/13/2017     PATIENT WAS SEEN IN OUR OFFICE 11/08/2017 MAY RETURN TO WORK 11/13/2017  DR JACKSON PRITCHARD / CHRIS Griffiths   Electronically signed by :  Titi Guajardo, ; Nov 8 2017  8:50AM EST                       (Author)

## 2018-01-17 NOTE — RESULT NOTES
Verified Results  * MRI ABDOMEN [address] [id number] 03:35PM Roz Gonzalez     Test Name Result Flag Reference   MRI ABDOMEN W WO CONTRAST (Report)     This is a summary report  The complete report is available in the patient's medical record  If you cannot access the medical record, please contact the sending organization for a detailed fax or copy  MRI - ABDOMEN - WITH AND WITHOUT CONTRAST     INDICATION: Lesion left hepatic lobe on prior CT      COMPARISON: CT dated December 14, 2016     TECHNIQUE: The following pulse sequences were obtained on a 1 5 T scanner prior to and following the administration of intravenous contrast:   Coronal and axial T2 with TE of 90 and 180 respectively, axial T2 with fat saturation, axial FIESTA fat-sat,   axial T1-weighted in-and-out-of phase, precontrast axial T1 with fat saturation, post-contrast dynamic axial T1 with fat saturation at 20, 70, and 180 seconds, coronal T1 with fat saturation and 7 minute delayed axial T1 with fat saturation  IV Contrast: gadobutrol injection (MULTI-DOSE) SOLN 10 mL Note: (SINGLE DOSE/MULTI DOSE) information refers to the container from which the contrast was acquired  Contrast was injected one time intravenously without immediate complication  FINDINGS: Patient is claustrophobic which required multiple imaging sessions  LIVER: Liver is enlarged  Normal contour without evidence of cirrhosis  There is no loss of signal on out-of-phase images to suggest hepatic steatosis  The portal and hepatic veins are patent without evidence of thrombosis  There is an arterial enhancing lesion in the medial segment left hepatic lobe measuring 7 mm image 12/35, this is only seen on this series, becoming isodense to liver on delayed series and not visible on precontrast T1 or T2 sequences     BILIARY TREE: There is no intra-hepatic biliary ductal dilatation  The common bile duct is normal in caliber   There is no gross evidence of mass or choledocholithiasis  GALLBLADDER: The gallbladder is normal in size and morphology  There is no evidence of sludge or stones  There is no gallbladder wall thickening or pericholecystic fluid  No gallbladder mass is identified  PANCREAS: The pancreas is normal in morphology and signal intensity  No cystic or solid mass is identified  The pancreatic duct is normal in caliber  ADRENAL GLANDS: The adrenal glands are normal in morphology without thickening or focal mass  SPLEEN: Mildly enlarged  No focal mass identified  KIDNEYS: The kidneys enhance symmetrically and are normal in morphology  There is no hydronephrosis  No focal cystic or solid renal masses are identified  LUNG BASES:  No gross evidence of mass or infiltrate  ABDOMINAL CAVITY: No lymphadenopathy or mass  No Ascites  Mesenteric fat is normal in signal without evidence of stranding or edema  No mesenteric nodularity or focal mass is identified  ABDOMINAL WALL: No mass or hernia identified  BOWEL:  Limited evaluation of the hollow viscera demonstrates no gross obstruction or mass  OSSEOUS STRUCTURES: There is no evidence of destructive process  VASCULAR STRUCTURES: The visualized vascular structures are patent without evidence of thrombosis or external compression  No aneurysm is identified  IMPRESSION:   Mild hepatosplenomegaly  Previous liver lesion in the medial segment of left hepatic lobe anteriorly is reidentified but only seen on the arterial phase of enhancement again measuring approximately 7 mm  Since this is not seen on any other sequence, further characterization is    not possible  This is most likely a vascular flow anomaly however follow-up CT is recommended in 4-6 months to document size stability since characterization is incomplete on these examinations and given the patient's history of newly diagnosed    papillary thyroid carcinoma  Celine Lightning      ##sigslh##sigslh     ##fuslh6##fuslh6 Workstation performed: [id number]     Signed by: Tammie Brooks DO   1/4/17       Discussion/Summary   SPOKE WITH PATIENT REGARDING RESULTS   - DR PRITCHARD      Signatures   Electronically signed by : Aston German MD; Jan 4 2017 10:50AM EST                       (Author)

## 2018-01-18 NOTE — PSYCH
History of Present Illness  Psychotherapy Provided  Luke: Individual Psychotherapy 30 minutes minutes provided today  Goals addressed in session:   Patient has long hx of mood and subspace abuse issues  Hx of impulsive behavior  Recently discharged inpatient at [facility] for relapse  Relapsed on heroin  Currently prescribed Abilify that was given at [facility]  Continues to have issues with anger/mood lability  Worried of their affect on children and his girlfriend  Details some family hx of mood issues  Personality component evident  Patient verbal and cooperative  HPI - Psych: Patient has long history of mental health and substance abuse hx and treatment  Has not genuinely invested in counseling to deal with mood and emotional issues  Continues to detail issues with mood swings and irritability but feels they are better managed on Abilify  Has sponsor and home group via [facility]  Denies any SI or HI   Note   Note:   Will refer patient for counseling via [facility]  Continues to have issues with anger and mood lability despite current dose of Abilify  Will consult with PCP about issue  Provided my contact information if needed moving forward  Assessment    1   Bipolar disorder (296 80) (F31 9)    Plan   1  *1 - [address] [facility] Physician Referral  Consult  Status: Hold For   - Scheduling  Requested for: [id number]  Health Referral Questions : Patient requires outpatient Psychotherapy      assessment/intake appointment (with licensed therapist)  Care Summary provided  : Yes    Signatures   Electronically signed by : Vijay Stearns LCSW; Aug  5 2016  3:01PM EST                       (Author)

## 2018-01-22 VITALS
WEIGHT: 265.25 LBS | SYSTOLIC BLOOD PRESSURE: 122 MMHG | HEART RATE: 84 BPM | BODY MASS INDEX: 37.98 KG/M2 | HEIGHT: 70 IN | RESPIRATION RATE: 16 BRPM | DIASTOLIC BLOOD PRESSURE: 88 MMHG

## 2018-01-23 VITALS
WEIGHT: 267.5 LBS | SYSTOLIC BLOOD PRESSURE: 112 MMHG | RESPIRATION RATE: 16 BRPM | HEIGHT: 71 IN | TEMPERATURE: 97.3 F | BODY MASS INDEX: 37.45 KG/M2 | HEART RATE: 96 BPM | DIASTOLIC BLOOD PRESSURE: 84 MMHG

## 2018-01-23 NOTE — PROGRESS NOTES
Assessment    1  Lumbar strain (847 2) (S39 012A)   2  Sacroiliac joint dysfunction of left side (724 6) (M53 3)    Plan  Lumbar strain    · * XR SPINE LUMBAR MINIMUM 4 VIEWS; Status:Active; Requested ETJ:[id number];     Discussion/Summary    Slowly improving left-sided sacroiliac region pain with radiation to the left buttock  Patient will continue diclofenac on as needed basis for pain  Tramadol was not effective for him and he may discontinue that medication  Continue physical therapy and home stretching exercises  Due to persistent pain patient will hold off from returning to work until next week  He would like to try unrestricted duty as of next week  She will followup with me in 2 or 3 weeks, sooner if needed  She will call me in the meantime with any questions or concerns  The treatment plan was reviewed with the patient/guardian  The patient/guardian understands and agrees with the treatment plan      Chief Complaint    1  Back Pain    History of Present Illness  HPI: Patient is a very pleasant 26-year-old male here for followup of his left-sided low back pain  Despite time, starting physical therapy, and use of oral steroid-induced, tramadol and NSAIDs he has not had any significant improvement  Pain is still localized over the lower lumbar and sacroiliac region and radiates to the left buttock  He has no weakness or numbness in his lower extremity no paresthesias  Pain is worse with sitting and twisting as well as lying down on the left side  Review of Systems    Constitutional: Night sweats  Eyes: No complaints of red eyes, no eyesight problems  ENT: no complaints of loss of hearing, no nosebleeds, no sore throat  Cardiovascular: No complaints of chest pain, no palpitations, no leg claudication or lower extremity edema  Respiratory: No complaints of shortness of breath, no wheezing, no cough     Gastrointestinal: No complaints of abdominal pain, no constipation, no nausea or vomiting, no diarrhea or bloody stools  Genitourinary: No complaints of dysuria or incontinence, no hesitancy, no nocturia  Musculoskeletal: as noted in HPI  Integumentary: No complaints of skin rash or lesion, no itching or dry skin, no skin wounds  Neurological: No complaints of headache, no confusion, no numbness or tingling, no dizziness  Psychiatric: No suicidal thoughts, no anxiety, no depression  Endocrine: No muscle weakness, no frequent urination, no excessive thirst, no feelings of weakness  ROS reviewed  Active Problems    1  Anxiety (300 00) (F41 9)   2  Arm pain (729 5) (M79 603)   3  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) (M19 012)   4  Arthrosis of right acromioclavicular joint (715 91) (M19 011)   5  Chest pain (786 50) (R07 9)   6  Current tobacco use (305 1) (Z72 0)   7  Decreased libido (799 81) (R68 82)   8  Depression (311) (F32 9)   9  Drug abuse (305 90) (F19 10)   10  Fatigue (780 79) (R53 83)   11  Lumbago (724 2) (M54 5)   12  Lumbar strain (847 2) (S39 012A)   13  Medial epicondylitis of right elbow (726 31) (M77 01)   14  Need for diphtheria-tetanus-pertussis (Tdap) vaccine (V06 1) (Z23)   15  Need for prophylactic vaccination and inoculation against influenza (V04 81) (Z23)   16  Pain in joint of left shoulder (719 41) (M25 512)   17  Pain in right shoulder (719 41) (M25 511)   18  Pain in shoulder region, left (719 41) (M25 512)   19  Palpitations (785 1) (R00 2)   20  Sacroiliac joint dysfunction of left side (724 6) (M53 3)   21  Syncope (780 2) (R55)   22  Tinea versicolor (111 0) (B36 0)   23  Viral syndrome (079 99) (B34 9)    Past Medical History    · No pertinent past medical history    The active problems and past medical history were reviewed and updated today  Surgical History    · History of Foot Surgery   · History of Knee Surgery    The surgical history was reviewed and updated today         Family History  Mother    · Family history of Diabetes · Family history of coronary artery disease (V17 3) (Z80 55)  Father    · Family history of Diabetes  Family History    · Family history of Brain cancer (191 9) (C71 9)    The family history was reviewed and updated today  Social History    · Current some day smoker (305 1) (F17 210)   · Current tobacco use (305 1) (Z72 0)   · Drinks beer (V49 89) (Z78 9)   · Drinks coffee   · Exercises regularly   · Denied: History of Heroin   · Denied: History of Marijuana  The social history was reviewed and updated today  Current Meds   1  Methocarbamol 750 MG Oral Tablet; TAKE 1 TABLET 3 times daily PRN muscle spasm   may make you drowsy; Therapy: 27OGN9450 to (Mor Andrade)  Requested for: 29RFN5993; Last   Rx:15Jun2016 Ordered   2  MethylPREDNISolone 4 MG Oral Tablet Therapy Pack; take as directed; Therapy: 12WKF1492 to (Last Rx:15Jun2016)  Requested for: 15Jun2016 Ordered    The medication list was reviewed and updated today  Allergies    1  No Known Drug Allergies    Vitals   Recorded: 49RCN5938 09:13AM   Heart Rate 99   Systolic 703   Diastolic 85   Height 5 ft 10 3 in   Weight 241 lb 4 00 oz   BMI Calculated 34 32   BSA Calculated 2 28     Physical Exam    Left Hip: Appearance: pelvic obliquity with the left hip higher than the right  Tenderness: None except the gluteus trinity and sacroiliac joint  Palpatory findings include no palpable clunk  Flexion: painful restricted AROM  External rotation: painful restricted AROM  Abduction: painful restricted AROM  Motor: Normal  Special Tests: positive LORRAINE test and equivocal impingement test, but negative Nito's test and negative Straight Leg Raise  Lumbosacral Spine: Appearance: Normal  Tenderness: level L5-S1 left paraspinal, but not the lumbar spine and not the right paraspinal  Palpatory Findings include bilateral muscle spasms  Flexion was restricted and was painful  Extension was restricted and was painful   Rotation to the left was restricted and was painful  Rotation to the right was restricted and was painful  Motor: Normal Bilateral lower extremity strength is 5 out of 5 and symmetric  Special Tests: negative Slump test and negative Straight Leg Raise  Constitutional - General appearance: Normal    Musculoskeletal - Gait and station: Abnormal  Gait evaluation demonstrated antalgia on the left  Lower extremity compartments: Normal    Cardiovascular - Pulses: Normal  Examination of extremities for edema and/or varicosities: Normal       Lungs: Normal respiratory rate and rhythm, no wheezes, no cough, no dyspnea  Skin - Skin and subcutaneous tissue: Normal    Neurologic - Sensation: Normal  Lower extremity peripheral neuro exam: Normal    Psychiatric - Mood and affect: Normal    Eyes   Conjunctiva and lids: Normal        Results/Data  I personally reviewed the films/images/results in the office today  My interpretation follows  X-ray Review X-ray of the lumbar spine shows no fracture or dislocation  No abnormal osseous lesions  Message  Return to work or school:   Nnamdi Munguia is under my professional care  He was seen in my office on 6/29/16    He is not able to return to work until 7/4/16     Trial of unrestricted work as of 7/5/16  Future Appointments    Date/Time Provider Specialty Site   08/17/2016 03:15 PM ABIOLA Graf   Urology [address] [place] [address] [place]     Signatures   Electronically signed by : Kendra Alarcon MD; Jun 29 2016 12:14PM EST                       (Author)

## 2018-01-23 NOTE — MISCELLANEOUS
Message  GI Reminder Recall [facility]:   Date: 12/21/2017   Dear Arturo Ma:     Review of our records shows you are due for the following: Consult  Please call the following office to schedule your appointment:   [place] [address], [facility], [place], [address] [place] [address] [phone]  We look forward to hearing from you!      Sincerely,     [facility]      Signatures   Electronically signed by : Shirley Conte, ; Dec 21 2017 12:59PM EST                       (Author)

## 2018-01-23 NOTE — MISCELLANEOUS
Message  Return to work or school:    He is able to return to work on  12/18/17      LIFTING RESTRICTION OF NOT MORE THAN 25 POUNDS FOR THE NEXT 30 DAYS          Signatures   Electronically signed by : Martyn Duane, [facility]; Dec 14 2017  2:08PM EST                       (Author)

## 2018-03-07 NOTE — PROGRESS NOTES
History of Present Illness    Revaccination   Vaccine Information: Vaccine(s) Given (names): Adacel (Tdap) [id number]  Spoke with patient regarding vaccine out of temperature range  Other Information: Spoke to patient he did have (Tdap) done on 03/10/2017 at [facility] documentation is in [address] [place] [address] Rd  Revaccination Completed: 03/10/2017  Active Problems    1  Anxiety (300 00) (F41 9)   2  Bipolar disorder (296 80) (F31 9)   3  Concussion with loss of consciousness, unspecified duration, initial encounter (850 5)   (S06 0X9A)   4  Concussion, with loss of consciousness of unspecified duration, subsequent encounter   (V58 89,850 5) (S06 0X9D)   5  Depression (311) (F32 9)   6  Encounter for sterilization (V25 2) (Z30 2)   7  Encounter for vasectomy counseling (V25 09) (Z30 09)   8  Fatigue (780 79) (R53 83)   9  Former smoker (V15 82) (M56 215)   10  Hypocalcemia (275 41) (E83 51)   11  Hypothyroidism, postsurgical (244 0) (E89 0)   12  Laceration of scalp, initial encounter (873 0) (S01 01XA)   13  Lesion of thyroid gland (246 8) (E07 89)   14  Liver lesion (573 8) (K76 9)   15  Low testosterone (257 2) (E29 1)   16  Lumbago (724 2) (M54 5)   17  Medial epicondylitis of right elbow (726 31) (M77 01)   18  Nausea and vomiting in adult (787 01) (R11 2)   19  Need for diphtheria-tetanus-pertussis (Tdap) vaccine (V06 1) (Z23)   20  Need for prophylactic vaccination and inoculation against influenza (V04 81) (Z23)   21  Need for revaccination (V05 9) (Z23)   22  Palpitations (785 1) (R00 2)   23  Papillary thyroid carcinoma (193) (C73)   24  Sacroiliac joint dysfunction of left side (724 6) (M53 3)   25  Suicidal ideation (V62 84) (R45 851)   26  Vitamin D deficiency (268 9) (E55 9)    Immunizations  Influenza --- Clark Tia: Temporarily Deferred: Pt refuses   Tdap --- Clakr Tia: 28-Sep-2015; Jose Samson: 10-Mar-2017     Current Meds   1  Abilify 10 MG Oral Tablet; TAKE 1 TABLET DAILY   2   Acetaminophen 325 MG Oral Tablet; TAKE 3 TABLET Every 6 hours   3  Ativan 2 MG Oral Tablet; TAKE 1 TABLET DAILY AS DIRECTED   4  FLUoxetine HCl - 20 MG Oral Tablet; TAKE 1 TABLET DAILY   5  HydrOXYzine HCl - 50 MG Oral Tablet; TAKE 1 TABLET Every 8 hours   6  Levothyroxine Sodium 175 MCG Oral Tablet; TAKE 1 TABLET DAILY   7  Meclizine HCl - 12 5 MG Oral Tablet; TAKE 1 TABLET Every 8 hours   8  Pantoprazole Sodium 40 MG Oral Tablet Delayed Release; TAKE 1 TABLET DAILY   9  Vitamin D (Ergocalciferol) 43596 UNIT Oral Capsule; take 1 capsule weekly   10  Zofran 4 MG Oral Tablet; TAKE 1 TABLET Every 8 hours    Allergies    1  No Known Drug Allergies    Results/Data  (1) TSH [id number] 11:24AM [place] Order Number: [id number]     Test Name Result Flag Reference   TSH 0 499 uIU/mL  0 358-3 740   - Patient Instructions: This bloodwork is non-fasting  Please drink two glasses of water morning of bloodwork  - Patient Instructions: This bloodwork is non-fasting  Please drink two glasses of water morning of bloodwork  Patients undergoing fluorescein dye angiography may retain small amounts of fluorescein in the body for 48-72 hours post procedure  Samples containing fluorescein can produce falsely depressed TSH values  If the patient had this procedure,a specimen should be resubmitted post fluorescein clearance  (1) T4, FREE [id number] 11:24AM [facility] Order Number: [id number]     Test Name Result Flag Reference   T4,FREE 1 19 ng/dL  0 76-1 46   - Patient Instructions: This bloodwork is non-fasting  Please drink two glasses of water morning of bloodwork         Future Appointments    Date/Time Provider Specialty Site   04/17/2017 02:45 PM David Gonzalez MD [place] [address]   03/29/2017 02:45 PM [place], [address]  Endocrinology [facility]   03/23/2017 03:00 PM Trauma, Schedule  [facility]     Signatures   Electronically signed by : Stella Dc MD; Mar 21 2017  4:11PM EST                       (Author)

## 2018-04-05 PROBLEM — E89.0 HYPOTHYROIDISM, POSTSURGICAL: Status: ACTIVE | Noted: 2017-01-27

## 2018-04-05 PROBLEM — F18.10 INHALANT ABUSE (HCC): Status: ACTIVE | Noted: 2017-03-27

## 2018-04-05 PROBLEM — R73.01 IMPAIRED FASTING GLUCOSE: Status: ACTIVE | Noted: 2017-04-04

## 2018-04-05 RX ORDER — LEVOTHYROXINE SODIUM 175 UG/1
TABLET ORAL
Refills: 0 | COMMUNITY
Start: 2018-03-12 | End: 2018-07-18 | Stop reason: SDUPTHER

## 2018-04-05 RX ORDER — ARIPIPRAZOLE 10 MG/1
TABLET ORAL
Refills: 0 | COMMUNITY
Start: 2018-02-19 | End: 2018-07-18 | Stop reason: ALTCHOICE

## 2018-04-05 RX ORDER — TRAZODONE HYDROCHLORIDE 50 MG/1
TABLET ORAL
Refills: 0 | COMMUNITY
Start: 2018-03-05 | End: 2018-04-06 | Stop reason: ALTCHOICE

## 2018-04-05 RX ORDER — CHOLECALCIFEROL (VITAMIN D3) 1250 MCG
CAPSULE ORAL
COMMUNITY
Start: 2017-02-01 | End: 2018-04-06 | Stop reason: SDUPTHER

## 2018-04-05 RX ORDER — MIRTAZAPINE 15 MG/1
TABLET, FILM COATED ORAL
COMMUNITY
Start: 2017-11-08 | End: 2018-04-06 | Stop reason: ALTCHOICE

## 2018-04-05 RX ORDER — LISINOPRIL 10 MG/1
1 TABLET ORAL DAILY
COMMUNITY
Start: 2017-12-14 | End: 2018-04-06 | Stop reason: ALTCHOICE

## 2018-04-06 ENCOUNTER — OFFICE VISIT (OUTPATIENT)
Dept: FAMILY MEDICINE CLINIC | Facility: CLINIC | Age: 38
End: 2018-04-06
Payer: COMMERCIAL

## 2018-04-06 VITALS
WEIGHT: 275.2 LBS | HEART RATE: 68 BPM | DIASTOLIC BLOOD PRESSURE: 82 MMHG | RESPIRATION RATE: 18 BRPM | SYSTOLIC BLOOD PRESSURE: 114 MMHG | BODY MASS INDEX: 54.03 KG/M2 | HEIGHT: 60 IN

## 2018-04-06 DIAGNOSIS — E89.0 HYPOTHYROIDISM, POSTSURGICAL: ICD-10-CM

## 2018-04-06 DIAGNOSIS — R73.01 IMPAIRED FASTING GLUCOSE: Primary | ICD-10-CM

## 2018-04-06 DIAGNOSIS — E55.9 VITAMIN D DEFICIENCY: ICD-10-CM

## 2018-04-06 DIAGNOSIS — F31.70 BIPOLAR DISORDER IN FULL REMISSION, MOST RECENT EPISODE UNSPECIFIED TYPE (HCC): ICD-10-CM

## 2018-04-06 PROCEDURE — 99214 OFFICE O/P EST MOD 30 MIN: CPT | Performed by: FAMILY MEDICINE

## 2018-04-06 RX ORDER — CHOLECALCIFEROL (VITAMIN D3) 1250 MCG
1 CAPSULE ORAL WEEKLY
Qty: 8 CAPSULE | Refills: 2 | Status: SHIPPED | OUTPATIENT
Start: 2018-04-06 | End: 2018-04-09 | Stop reason: SDUPTHER

## 2018-04-06 NOTE — LETTER
April 6, 2018     Patient: Trudy Howard   YOB: 1980   Date of Visit: 4/6/2018       To Whom it May Concern:    Trudy Howard is under my professional care  He was seen in my office on 4/6/2018  He may return to work on 4/9/18 without any restriction  If you have any questions or concerns, please don't hesitate to call           Sincerely,          Jonnathan Karimi MD
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
## 2018-04-07 ENCOUNTER — APPOINTMENT (OUTPATIENT)
Dept: LAB | Facility: MEDICAL CENTER | Age: 38
End: 2018-04-07
Payer: COMMERCIAL

## 2018-04-07 ENCOUNTER — TRANSCRIBE ORDERS (OUTPATIENT)
Dept: ADMINISTRATIVE | Facility: HOSPITAL | Age: 38
End: 2018-04-07

## 2018-04-07 DIAGNOSIS — R73.01 IMPAIRED FASTING GLUCOSE: Primary | ICD-10-CM

## 2018-04-07 DIAGNOSIS — E89.0 HYPOTHYROIDISM, POSTSURGICAL: ICD-10-CM

## 2018-04-07 DIAGNOSIS — R73.01 IMPAIRED FASTING GLUCOSE: ICD-10-CM

## 2018-04-07 LAB
25(OH)D3 SERPL-MCNC: 15.2 NG/ML (ref 30–100)
ALBUMIN SERPL BCP-MCNC: 3.9 G/DL (ref 3.5–5)
ALP SERPL-CCNC: 91 U/L (ref 46–116)
ALT SERPL W P-5'-P-CCNC: 29 U/L (ref 12–78)
ANION GAP SERPL CALCULATED.3IONS-SCNC: 4 MMOL/L (ref 4–13)
AST SERPL W P-5'-P-CCNC: 15 U/L (ref 5–45)
BASOPHILS # BLD AUTO: 0.01 THOUSANDS/ΜL (ref 0–0.1)
BASOPHILS NFR BLD AUTO: 0 % (ref 0–1)
BILIRUB SERPL-MCNC: 0.31 MG/DL (ref 0.2–1)
BUN SERPL-MCNC: 13 MG/DL (ref 5–25)
CALCIUM SERPL-MCNC: 8.9 MG/DL (ref 8.3–10.1)
CHLORIDE SERPL-SCNC: 111 MMOL/L (ref 100–108)
CHOLEST SERPL-MCNC: 230 MG/DL (ref 50–200)
CO2 SERPL-SCNC: 26 MMOL/L (ref 21–32)
CREAT SERPL-MCNC: 0.96 MG/DL (ref 0.6–1.3)
EOSINOPHIL # BLD AUTO: 0.25 THOUSAND/ΜL (ref 0–0.61)
EOSINOPHIL NFR BLD AUTO: 4 % (ref 0–6)
ERYTHROCYTE [DISTWIDTH] IN BLOOD BY AUTOMATED COUNT: 12.9 % (ref 11.6–15.1)
GFR SERPL CREATININE-BSD FRML MDRD: 99 ML/MIN/1.73SQ M
GLUCOSE P FAST SERPL-MCNC: 122 MG/DL (ref 65–99)
HCT VFR BLD AUTO: 46 % (ref 36.5–49.3)
HDLC SERPL-MCNC: 34 MG/DL (ref 40–60)
HGB BLD-MCNC: 14.6 G/DL (ref 12–17)
LDLC SERPL CALC-MCNC: 164 MG/DL (ref 0–100)
LYMPHOCYTES # BLD AUTO: 2.23 THOUSANDS/ΜL (ref 0.6–4.47)
LYMPHOCYTES NFR BLD AUTO: 37 % (ref 14–44)
MCH RBC QN AUTO: 27.9 PG (ref 26.8–34.3)
MCHC RBC AUTO-ENTMCNC: 31.7 G/DL (ref 31.4–37.4)
MCV RBC AUTO: 88 FL (ref 82–98)
MONOCYTES # BLD AUTO: 0.3 THOUSAND/ΜL (ref 0.17–1.22)
MONOCYTES NFR BLD AUTO: 5 % (ref 4–12)
NEUTROPHILS # BLD AUTO: 3.22 THOUSANDS/ΜL (ref 1.85–7.62)
NEUTS SEG NFR BLD AUTO: 54 % (ref 43–75)
NRBC BLD AUTO-RTO: 0 /100 WBCS
PLATELET # BLD AUTO: 250 THOUSANDS/UL (ref 149–390)
PMV BLD AUTO: 9.5 FL (ref 8.9–12.7)
POTASSIUM SERPL-SCNC: 4.1 MMOL/L (ref 3.5–5.3)
PROT SERPL-MCNC: 7.6 G/DL (ref 6.4–8.2)
RBC # BLD AUTO: 5.24 MILLION/UL (ref 3.88–5.62)
SODIUM SERPL-SCNC: 141 MMOL/L (ref 136–145)
TRIGL SERPL-MCNC: 161 MG/DL
TSH SERPL DL<=0.05 MIU/L-ACNC: 0.94 UIU/ML (ref 0.36–3.74)
WBC # BLD AUTO: 6.05 THOUSAND/UL (ref 4.31–10.16)

## 2018-04-07 PROCEDURE — 82306 VITAMIN D 25 HYDROXY: CPT

## 2018-04-07 PROCEDURE — 80053 COMPREHEN METABOLIC PANEL: CPT | Performed by: FAMILY MEDICINE

## 2018-04-07 PROCEDURE — 36415 COLL VENOUS BLD VENIPUNCTURE: CPT | Performed by: FAMILY MEDICINE

## 2018-04-07 PROCEDURE — 80061 LIPID PANEL: CPT

## 2018-04-07 PROCEDURE — 85025 COMPLETE CBC W/AUTO DIFF WBC: CPT

## 2018-04-07 PROCEDURE — 84443 ASSAY THYROID STIM HORMONE: CPT

## 2018-04-09 DIAGNOSIS — E55.9 VITAMIN D DEFICIENCY: ICD-10-CM

## 2018-04-09 RX ORDER — CHOLECALCIFEROL (VITAMIN D3) 1250 MCG
1 CAPSULE ORAL WEEKLY
Qty: 8 CAPSULE | Refills: 3 | Status: SHIPPED | OUTPATIENT
Start: 2018-04-09 | End: 2018-07-18 | Stop reason: SDUPTHER

## 2018-04-11 ENCOUNTER — APPOINTMENT (EMERGENCY)
Dept: RADIOLOGY | Facility: HOSPITAL | Age: 38
End: 2018-04-11
Payer: COMMERCIAL

## 2018-04-11 ENCOUNTER — HOSPITAL ENCOUNTER (EMERGENCY)
Facility: HOSPITAL | Age: 38
Discharge: LEFT BEFORE DISCHARGE | End: 2018-04-11
Attending: EMERGENCY MEDICINE
Payer: COMMERCIAL

## 2018-04-11 ENCOUNTER — HOSPITAL ENCOUNTER (EMERGENCY)
Facility: HOSPITAL | Age: 38
Discharge: HOME | End: 2018-04-11
Attending: EMERGENCY MEDICINE | Admitting: EMERGENCY MEDICINE
Payer: COMMERCIAL

## 2018-04-11 VITALS
WEIGHT: 275.57 LBS | RESPIRATION RATE: 18 BRPM | SYSTOLIC BLOOD PRESSURE: 145 MMHG | OXYGEN SATURATION: 94 % | HEIGHT: 72 IN | HEART RATE: 102 BPM | DIASTOLIC BLOOD PRESSURE: 80 MMHG | TEMPERATURE: 98.2 F | BODY MASS INDEX: 37.33 KG/M2

## 2018-04-11 VITALS
OXYGEN SATURATION: 95 % | DIASTOLIC BLOOD PRESSURE: 64 MMHG | SYSTOLIC BLOOD PRESSURE: 127 MMHG | TEMPERATURE: 98.2 F | RESPIRATION RATE: 16 BRPM | HEART RATE: 102 BPM

## 2018-04-11 DIAGNOSIS — S50.00XA ELBOW CONTUSION: ICD-10-CM

## 2018-04-11 DIAGNOSIS — T33.90XA FROSTBITE, INITIAL ENCOUNTER: ICD-10-CM

## 2018-04-11 DIAGNOSIS — F18.10 HUFFING (HCC): Primary | ICD-10-CM

## 2018-04-11 DIAGNOSIS — S50.319A ELBOW ABRASION: ICD-10-CM

## 2018-04-11 DIAGNOSIS — F19.10 SUBSTANCE ABUSE (HCC): Primary | ICD-10-CM

## 2018-04-11 DIAGNOSIS — F19.10 SUBSTANCE ABUSE (HCC): ICD-10-CM

## 2018-04-11 DIAGNOSIS — S93.401A RIGHT ANKLE SPRAIN: ICD-10-CM

## 2018-04-11 LAB
ETHANOL EXG-MCNC: 0 MG/DL
GLUCOSE SERPL-MCNC: 95 MG/DL (ref 65–140)

## 2018-04-11 PROCEDURE — 93005 ELECTROCARDIOGRAM TRACING: CPT

## 2018-04-11 PROCEDURE — 90715 TDAP VACCINE 7 YRS/> IM: CPT | Performed by: EMERGENCY MEDICINE

## 2018-04-11 PROCEDURE — 99284 EMERGENCY DEPT VISIT MOD MDM: CPT

## 2018-04-11 PROCEDURE — 73080 X-RAY EXAM OF ELBOW: CPT

## 2018-04-11 PROCEDURE — 82075 ASSAY OF BREATH ETHANOL: CPT | Performed by: EMERGENCY MEDICINE

## 2018-04-11 PROCEDURE — 73610 X-RAY EXAM OF ANKLE: CPT

## 2018-04-11 PROCEDURE — 90471 IMMUNIZATION ADMIN: CPT

## 2018-04-11 PROCEDURE — 82948 REAGENT STRIP/BLOOD GLUCOSE: CPT

## 2018-04-11 PROCEDURE — 99285 EMERGENCY DEPT VISIT HI MDM: CPT

## 2018-04-11 RX ORDER — ACETAMINOPHEN 325 MG/1
975 TABLET ORAL ONCE
Status: COMPLETED | OUTPATIENT
Start: 2018-04-11 | End: 2018-04-11

## 2018-04-11 RX ORDER — CEPHALEXIN 250 MG/1
500 CAPSULE ORAL ONCE
Status: COMPLETED | OUTPATIENT
Start: 2018-04-11 | End: 2018-04-11

## 2018-04-11 RX ORDER — CEPHALEXIN 500 MG/1
500 CAPSULE ORAL EVERY 8 HOURS SCHEDULED
Qty: 21 CAPSULE | Refills: 0 | Status: SHIPPED | OUTPATIENT
Start: 2018-04-11 | End: 2018-04-18

## 2018-04-11 RX ADMIN — ACETAMINOPHEN 975 MG: 325 TABLET, FILM COATED ORAL at 18:45

## 2018-04-11 RX ADMIN — CEPHALEXIN 500 MG: 250 CAPSULE ORAL at 20:02

## 2018-04-11 RX ADMIN — TETANUS TOXOID, REDUCED DIPHTHERIA TOXOID AND ACELLULAR PERTUSSIS VACCINE, ADSORBED 0.5 ML: 5; 2.5; 8; 8; 2.5 SUSPENSION INTRAMUSCULAR at 18:51

## 2018-04-11 NOTE — ED NOTES
EMS brought 5 cans that pt admitted to huffing throughout the day  2 empty cans of [employer]  1 empty can of [employer]  2 cans (1 half full) of Roselyns Prijewell Molina RN  04/11/18 9380

## 2018-04-11 NOTE — DISCHARGE INSTRUCTIONS
Abrasion   WHAT YOU NEED TO KNOW:   What is an abrasion? An abrasion is a scrape on your skin  It happens when your skin rubs against a rough surface  Some examples of an abrasion include rug burn, a skinned elbow, or road rash  Abrasions can be many shapes and sizes  The wound may hurt, bleed, bruise, or swell  How can I care for my abrasion? · Wash your hands and dry them with a clean towel  · Press a clean cloth against your wound to stop any bleeding  · Rinse your wound with a lot of clean water  Do not use harsh soap, alcohol, or iodine solutions  · Use a clean, wet cloth to remove any objects, such as small pieces of rocks or dirt  · Rub antibiotic ointment on your wound  This may help prevent infection and help your wound heal     · Cover the wound with a non-stick bandage  Change the bandage daily, and if gets wet or dirty  When should I seek immediate care? · The bleeding does not stop after 10 minutes of firm pressure  · You cannot rinse one or more foreign objects out of your wound  · You have red streaks on your skin coming from your wound  When should I contact my healthcare provider? · You have a fever or chills  · Your abrasion is red, warm, swollen, or draining pus  · You have questions or concerns about your condition or care  CARE AGREEMENT:   You have the right to help plan your care  Learn about your health condition and how it may be treated  Discuss treatment options with your caregivers to decide what care you want to receive  You always have the right to refuse treatment  The above information is an  only  It is not intended as medical advice for individual conditions or treatments  Talk to your doctor, nurse or pharmacist before following any medical regimen to see if it is safe and effective for you    © 2017 [employer] Information is for End User's use only and may not be sold, redistributed or otherwise used for commercial purposes  All illustrations and images included in CareNotes® are the copyrighted property of A D A M , Inc  or Lloyd Alaniz  Ankle Sprain   AMBULATORY CARE:   An ankle sprain  happens when 1 or more ligaments in your ankle joint stretch or tear  Ligaments are tough tissues that connect bones  Ligaments support your joints and keep your bones in place  Common symptoms include the following:   · Trouble moving your ankle or foot    · Pain when you touch or put weight on your ankle    · Bruised, swollen, or misshapen ankle  Seek care immediately if:   · You have severe pain in your ankle  · Your foot or toes are cold or numb  · Your ankle becomes more weak or unstable (wobbly)  · You are unable to put any weight on your ankle or foot  · Your swelling has increased or returned  Contact your healthcare provider if:   · Your pain does not go away, even after treatment  · You have questions or concerns about your condition or care  Treatment:   · Medicines      ¨ NSAIDs , such as ibuprofen, help decrease swelling, pain, and fever  This medicine is available with or without a doctor's order  NSAIDs can cause stomach bleeding or kidney problems in certain people  If you take blood thinner medicine, always ask your healthcare provider if NSAIDs are safe for you  Always read the medicine label and follow directions  ¨ Acetaminophen  decreases pain  It is available without a doctor's order  Ask how much to take and how often to take it  Follow directions  Acetaminophen can cause liver damage if not taken correctly  ¨ Prescription pain medicine  may be given  Ask how to take this medicine safely  · Surgery  may be needed to repair or replace a torn ligament if your sprain does not heal with other treatments  Your healthcare provider may use screws to attach the bones in your ankle together  The screws may help support your ankle and make it stable   Ask your healthcare provider for more information about surgery to treat your ankle sprain  Self care:   · Use support devices,  such as a brace, cast, or splint, may be needed to limit your movement and protect your joint  You may need to use crutches to decrease your pain as you move around  · Go to physical therapy as directed  A physical therapist teaches you exercises to help improve movement and strength, and to decrease pain  · Rest  your ankle so that it can heal  Return to normal activities as directed  · Apply ice on your ankle for 15 to 20 minutes every hour or as directed  Use an ice pack, or put crushed ice in a plastic bag  Cover it with a towel  Ice helps prevent tissue damage and decreases swelling and pain  · Compress  your ankle  Ask if you should wrap an elastic bandage around your injured ligament  An elastic bandage provides support and helps decrease swelling and movement so your joint can heal  Wear as long as directed  · Elevate  your ankle above the level of your heart as often as you can  This will help decrease swelling and pain  Prop your ankle on pillows or blankets to keep it elevated comfortably  Prevent another ankle sprain:   · Let your ankle heal   Find out how long your ligament needs to heal  Do not do any physical activity until your healthcare provider says it is okay  If you start activity too soon, you may develop a more serious injury  · Always warm up and stretch  before you exercise or play sports  · Use the right equipment  Always wear shoes that fit well and are made for the activity that you are doing  You may also need ankle supports, elbow and knee pads, or braces  Follow up with your healthcare provider as directed:  Write down your questions so you remember to ask them during your visits  © 2017 [address] [employer] Information is for End User's use only and may not be sold, redistributed or otherwise used for commercial purposes   All illustrations and images included in CareNotes® are the copyrighted property of [employer]  or [address]  The above information is an  only  It is not intended as medical advice for individual conditions or treatments  Talk to your doctor, nurse or pharmacist before following any medical regimen to see if it is safe and effective for you  Contusion in Adults   AMBULATORY CARE:   A contusion  is a bruise that appears on your skin after an injury  A bruise happens when small blood vessels tear but skin does not  When blood vessels tear, blood leaks into nearby tissue, such as soft tissue or muscle  Other signs and symptoms you may have with a contusion:   · Pain that increases when you touch the bruise, walk, or use the area around the bruise    · Swelling or a lump at the site of the bruise or near it    · Red, blue, or black skin that may change to green or yellow after a few days           · Stiffness or problems moving the bruised area of your body  Seek care immediately if:   · You have new trouble moving the injured area  · You have tingling or numbness in or near the injured area  · Your hand or foot below the bruise gets cold or turns pale  Contact your healthcare provider if:   · You find a new lump in the injured area  · Your symptoms do not improve with treatment after 4 to 5 days  · You have questions or concerns about your condition or care  Treatment for a contusion  may include any of the following:  · NSAIDs , such as ibuprofen, help decrease swelling, pain, and fever  This medicine is available with or without a doctor's order  NSAIDs can cause stomach bleeding or kidney problems in certain people  If you take blood thinner medicine, always ask your healthcare provider if NSAIDs are safe for you  Always read the medicine label and follow directions  · Prescription pain medicine  may be given   Do not wait until the pain is severe before you take your medicine  · Aspiration  is a procedure used to drain pooled blood in your muscle  This may help prevent increased pressure in the muscle  · Surgery  may be done to repair a tear in the muscle or relieve pressure in the muscle caused by swelling  Help a contusion heal:   · Rest the injured area  or use it less than usual  If you bruised your leg or foot, you may need crutches or a cane to help you walk  This will help you keep weight off your injured body part  · Apply ice  to decrease swelling and pain  Ice may also help prevent tissue damage  Use an ice pack, or put crushed ice in a plastic bag  Cover it with a towel and place it on your bruise for 15 to 20 minutes every hour or as directed  · Use compression  to support the area and decrease swelling  Wrap an elastic bandage around the area over the bruised muscle  Make sure the bandage is not too tight  You should be able to fit 1 finger between the bandage and your skin  · Elevate (raise) your injured body part  above the level of your heart to help decrease pain and swelling  Use pillows, blankets, or rolled towels to elevate the area as often as you can  · Do not drink alcohol  as directed  Alcohol may slow healing  · Do not stretch injured muscles  right after your injury  Ask your healthcare provider when and how you may safely stretch after your injury  Gentle stretches can help increase your flexibility  · Do not massage the area or put heating pads  on the bruise right after your injury  Heat and massage may slow healing  Your healthcare provider may tell you to apply heat after several days  At that time, heat will start to help the injury heal   Follow up with your healthcare provider as directed:  Write down your questions so you remember to ask them during your visits  © 2017 [employer] Information is for End User's use only and may not be sold, redistributed or otherwise used for commercial purposes   All illustrations and images included in CareNotes® are the copyrighted property of [employer]  or [employer] Alaniz  The above information is an  only  It is not intended as medical advice for individual conditions or treatments  Talk to your doctor, nurse or pharmacist before following any medical regimen to see if it is safe and effective for you  Frostbite   WHAT YOU NEED TO KNOW:   What is frostbite? Frostbite is an injury that happens when the skin and tissue beneath the skin freezes  People usually get frostbite on the hands, feet, nose, and ears  What increases my risk for frostbite? You are at risk for frostbite if you are out in cold weather for a long time  Frostbite is more likely to happen to skin that is not covered  People who have poor circulation or cannot feel the effects of cold have a higher risk of frostbite  This includes the elderly, people with diabetes, or those who abuse alcohol or smoke  What are the signs and symptoms of frostbite? · Your skin first becomes cold and red  Then it gets numb and hard, and it turns white  Your skin color changes from white to red as it warms  You may feel pain, tingling, and burning as your skin warms  Your skin may swell and you may develop blisters  · With more severe frostbite, you may develop blisters filled with blood  The most severe type of frostbite causes gangrene  With gangrene, the skin turns black and the tissue dies  Healthcare providers may not know how much tissue damage has been caused by frostbite for several weeks  How is frostbite diagnosed? Your healthcare provider will examine the areas of your skin that have frostbite  He will also ask you about your signs and symptoms  · ECG:  This is also called an EKG  A short period of electrical activity in your heart is recorded to check for damage or problems  · Blood tests:   You may need blood taken to give caregivers information about how your body is working  The blood may be taken from your hand, arm, or IV  · MRI:  This scan uses powerful magnets and a computer to take pictures of your blood vessels  An MRI may show if there is any damage to your blood vessels  You may be given dye to help the pictures show up better  Tell healthcare providers if you are allergic to iodine or shellfish  You may also be allergic to the dye  Do not enter the MRI room with any metal  Metal can cause serious injury  Tell healthcare providers if you have any metal in or on your body  · Bone scan: This is a test to look at your bones  You are given a small amount of dye in an IV  Pictures are taken of your bones to see if there is any damage from frostbite  How is frostbite treated? · Warm bath:  A warm bath may help rewarm the areas of your body that have frostbite  · Bandages: The areas of your body with frostbite may need to be bandaged  Clean, sterile, bandages will help keep these areas from getting infected  Gauze pads may be put on and between injured fingers or toes  · Medicines:      ¨ Antibiotics:  Antibiotic medicine may be given to treat an infection caused by bacteria  ¨ NSAIDs:  This medicine decreases swelling, pain, and fever  NSAIDs are available without a doctor's order  Ask your healthcare provider which medicine is right for you and how much to take  Take as directed  NSAIDs can cause stomach bleeding or kidney problems if not taken correctly  ¨ Pain medicine: You may be given a prescription medicine to decrease severe pain  Do not wait until the pain is severe before you take your pain medicine  ¨ Td vaccine  is a booster shot used to help prevent tetanus and diphtheria  The Td booster may be given to adolescents and adults every 10 years or for certain wounds and injuries  What are the risks of frostbite? Even with treatment, you may have severe damage to blood vessels, muscles, and nerve tissue   You may lose a body part that has severe frostbite  You may be sensitive to cold and have burning and tingling in the areas of your body that have had frostbite  Without treatment, you could lose skin or a body part that has severe frostbite  How do I manage my symptoms? · Elevate:  Raise the frostbitten area above the level of your heart as often as you can  This will help decrease swelling and pain  Prop the frostbitten area on pillows or blankets to keep it elevated  · Foot cradle: This will help keep bedding off your feet if they have frostbite  Bedding could rub against the frostbitten area and cause pain  Cut 2 sides off a large cardboard box  Put the box under your sheets at the foot of your bed  Put one of the open sections facing the head of the bed and the other open section against the mattress  · Activity:  Move the part of your body that has frostbite as much as possible  This will help keep blood flowing through the area and help it heal faster  Do not walk on frostbitten feet if possible  How can I help prevent frostbite? · Wear several layers of loose, warm clothes:  Put these layers under a windproof and waterproof coat  Your head, face, and neck should be covered with a warm hat and scarf  Breathing through your scarf helps prevent loss of body heat  Make sure your hands, ears, and feet are covered  · Use the malachi system when you are outside for long periods: This is when you and the people you are with check each other for white areas on your face and ears  · Do not drink alcohol or smoke before you go out in the cold:  Alcohol and tobacco increase your risk of frostbite  When should I contact my healthcare provider? · You have blisters filled with blood  · Your pain gets worse  · You have questions or concerns about your condition or care  When should I seek immediate care? · You have swelling, redness, or pus in the area that was frostbitten  · You have a fever      · The skin with frostbite turns black  · You lose feeling in the area that was frostbitten  CARE AGREEMENT:   You have the right to help plan your care  Learn about your health condition and how it may be treated  Discuss treatment options with your caregivers to decide what care you want to receive  You always have the right to refuse treatment  The above information is an  only  It is not intended as medical advice for individual conditions or treatments  Talk to your doctor, nurse or pharmacist before following any medical regimen to see if it is safe and effective for you  © 2017 [address]  Information is for End User's use only and may not be sold, redistributed or otherwise used for commercial purposes  All illustrations and images included in CareNotes® are the copyrighted property of A D A M , Inc  or Lloyd Alaniz

## 2018-04-11 NOTE — ED NOTES
Pt addimitely requesting IV to be removed and to leave  20g IV site of left hand removed, pressure bandage applied  Pt report given to Dr Tricia Melendez MD at bedside       Candida Carcamo RN  04/11/18 1552
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
## 2018-04-11 NOTE — ED NOTES
Pt refused to stay for evaluation/tx or sign AMA forms  MD provided detailed explaination regarding hazards to huffing and/or leaving AMA  Pt ambulated to waiting room with RN  Pt repeatedly stated "I'm sorry, I messed up"       Candido Ramires RN  04/11/18 0800
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
## 2018-04-12 LAB
ATRIAL RATE: 120 BPM
P AXIS: 43 DEGREES
PR INTERVAL: 134 MS
QRS AXIS: 16 DEGREES
QRSD INTERVAL: 88 MS
QT INTERVAL: 318 MS
QTC INTERVAL: 449 MS
T WAVE AXIS: 29 DEGREES
VENTRICULAR RATE: 120 BPM

## 2018-04-12 PROCEDURE — 93010 ELECTROCARDIOGRAM REPORT: CPT | Performed by: INTERNAL MEDICINE

## 2018-04-13 DIAGNOSIS — E03.9 HYPOTHYROIDISM, UNSPECIFIED TYPE: Primary | ICD-10-CM

## 2018-04-14 RX ORDER — LEVOTHYROXINE SODIUM 175 UG/1
TABLET ORAL
Qty: 90 TABLET | Refills: 1 | Status: SHIPPED | OUTPATIENT
Start: 2018-04-14 | End: 2019-05-15 | Stop reason: SDUPTHER

## 2018-05-25 DIAGNOSIS — E03.9 HYPOTHYROIDISM, UNSPECIFIED TYPE: ICD-10-CM

## 2018-05-25 DIAGNOSIS — E89.0 HYPOTHYROIDISM, POSTSURGICAL: Primary | ICD-10-CM

## 2018-05-25 RX ORDER — LEVOTHYROXINE SODIUM 175 UG/1
175 TABLET ORAL DAILY
Qty: 30 TABLET | Refills: 5 | Status: SHIPPED | OUTPATIENT
Start: 2018-05-25 | End: 2018-07-18 | Stop reason: SDUPTHER

## 2018-07-11 ENCOUNTER — TELEPHONE (OUTPATIENT)
Dept: ENDOCRINOLOGY | Facility: CLINIC | Age: 38
End: 2018-07-11

## 2018-07-11 DIAGNOSIS — E27.40 LOW SERUM CORTISOL LEVEL (HCC): Primary | ICD-10-CM

## 2018-07-11 DIAGNOSIS — E89.0 POSTOPERATIVE HYPOTHYROIDISM: ICD-10-CM

## 2018-07-11 DIAGNOSIS — R73.01 IMPAIRED FASTING GLUCOSE: ICD-10-CM

## 2018-07-11 DIAGNOSIS — C73 PAPILLARY THYROID CARCINOMA (HCC): ICD-10-CM

## 2018-07-11 DIAGNOSIS — R79.89 LOW TESTOSTERONE IN MALE: ICD-10-CM

## 2018-07-11 DIAGNOSIS — E55.9 VITAMIN D DEFICIENCY: ICD-10-CM

## 2018-07-11 NOTE — TELEPHONE ENCOUNTER
----- Message from Karen Collado sent at 7/10/2018  3:02 PM EDT -----  Patient is on wait list for appointment  Please let me know what labs you'd like him to do  Thank you

## 2018-07-11 NOTE — TELEPHONE ENCOUNTER
TSH, free T4, vitamin-D, a m   Cortisol, thyroglobulin tumor marker with antibodies, vitamin-D level    Kristi Wilkinson MD

## 2018-07-13 ENCOUNTER — APPOINTMENT (OUTPATIENT)
Dept: LAB | Facility: CLINIC | Age: 38
End: 2018-07-13
Payer: COMMERCIAL

## 2018-07-13 ENCOUNTER — TELEPHONE (OUTPATIENT)
Dept: ENDOCRINOLOGY | Facility: CLINIC | Age: 38
End: 2018-07-13

## 2018-07-13 DIAGNOSIS — E27.40 LOW SERUM CORTISOL LEVEL (HCC): ICD-10-CM

## 2018-07-13 DIAGNOSIS — E89.0 POSTOPERATIVE HYPOTHYROIDISM: ICD-10-CM

## 2018-07-13 DIAGNOSIS — E55.9 VITAMIN D DEFICIENCY: ICD-10-CM

## 2018-07-13 DIAGNOSIS — R73.01 IMPAIRED FASTING GLUCOSE: ICD-10-CM

## 2018-07-13 DIAGNOSIS — E27.40 LOW SERUM CORTISOL LEVEL (HCC): Primary | ICD-10-CM

## 2018-07-13 DIAGNOSIS — C73 THYROID CA (HCC): ICD-10-CM

## 2018-07-13 DIAGNOSIS — C73 PAPILLARY THYROID CARCINOMA (HCC): ICD-10-CM

## 2018-07-13 LAB
25(OH)D3 SERPL-MCNC: 42.7 NG/ML (ref 30–100)
CORTIS AM PEAK SERPL-MCNC: 6.6 UG/DL (ref 4.2–22.4)
T4 FREE SERPL-MCNC: 1.02 NG/DL (ref 0.76–1.46)
TSH SERPL DL<=0.05 MIU/L-ACNC: 0.4 UIU/ML (ref 0.36–3.74)

## 2018-07-13 PROCEDURE — 82306 VITAMIN D 25 HYDROXY: CPT

## 2018-07-13 PROCEDURE — 36415 COLL VENOUS BLD VENIPUNCTURE: CPT

## 2018-07-13 PROCEDURE — 84432 ASSAY OF THYROGLOBULIN: CPT

## 2018-07-13 PROCEDURE — 84439 ASSAY OF FREE THYROXINE: CPT

## 2018-07-13 PROCEDURE — 84443 ASSAY THYROID STIM HORMONE: CPT

## 2018-07-13 PROCEDURE — 86800 THYROGLOBULIN ANTIBODY: CPT

## 2018-07-13 PROCEDURE — 82533 TOTAL CORTISOL: CPT

## 2018-07-14 LAB
THYROGLOB AB SERPL-ACNC: <1 IU/ML (ref 0–0.9)
THYROGLOB SERPL-MCNC: 0.8 NG/ML (ref 1.4–29.2)

## 2018-07-16 ENCOUNTER — TELEPHONE (OUTPATIENT)
Dept: ENDOCRINOLOGY | Facility: CLINIC | Age: 38
End: 2018-07-16

## 2018-07-16 NOTE — TELEPHONE ENCOUNTER
----- Message from Isidoro Gottron, MD sent at 7/16/2018 12:49 PM EDT -----  Please call the patient regarding his abnormal result    Please inform patient that his thyroglobulin tumor marker is slightly elevated than before, please make appointment for follow-up ASAP , for next plan of action for thyroid cancer and low cortisol

## 2018-07-18 ENCOUNTER — TELEPHONE (OUTPATIENT)
Dept: FAMILY MEDICINE CLINIC | Facility: CLINIC | Age: 38
End: 2018-07-18

## 2018-07-18 ENCOUNTER — OFFICE VISIT (OUTPATIENT)
Dept: ENDOCRINOLOGY | Facility: CLINIC | Age: 38
End: 2018-07-18
Payer: COMMERCIAL

## 2018-07-18 VITALS
HEIGHT: 72 IN | DIASTOLIC BLOOD PRESSURE: 90 MMHG | BODY MASS INDEX: 39.96 KG/M2 | HEART RATE: 108 BPM | WEIGHT: 295 LBS | SYSTOLIC BLOOD PRESSURE: 112 MMHG

## 2018-07-18 DIAGNOSIS — R73.03 PREDIABETES: ICD-10-CM

## 2018-07-18 DIAGNOSIS — C73 PAPILLARY THYROID CARCINOMA (HCC): ICD-10-CM

## 2018-07-18 DIAGNOSIS — E89.0 POSTOPERATIVE HYPOTHYROIDISM: ICD-10-CM

## 2018-07-18 DIAGNOSIS — R73.01 IMPAIRED FASTING GLUCOSE: ICD-10-CM

## 2018-07-18 DIAGNOSIS — E29.1 HYPOGONADISM MALE: ICD-10-CM

## 2018-07-18 DIAGNOSIS — R79.89 LOW TESTOSTERONE IN MALE: ICD-10-CM

## 2018-07-18 DIAGNOSIS — E27.40 LOW SERUM CORTISOL LEVEL (HCC): Primary | ICD-10-CM

## 2018-07-18 DIAGNOSIS — E55.9 VITAMIN D DEFICIENCY: ICD-10-CM

## 2018-07-18 DIAGNOSIS — E03.9 HYPOTHYROIDISM, UNSPECIFIED TYPE: ICD-10-CM

## 2018-07-18 PROCEDURE — 99215 OFFICE O/P EST HI 40 MIN: CPT | Performed by: INTERNAL MEDICINE

## 2018-07-18 RX ORDER — CHOLECALCIFEROL (VITAMIN D3) 1250 MCG
1 CAPSULE ORAL WEEKLY
Qty: 8 CAPSULE | Refills: 0 | Status: SHIPPED | OUTPATIENT
Start: 2018-07-18 | End: 2020-05-14

## 2018-07-18 RX ORDER — RISPERIDONE 2 MG/1
2 TABLET, FILM COATED ORAL 2 TIMES DAILY
Refills: 0 | COMMUNITY
Start: 2018-05-25 | End: 2018-09-25

## 2018-07-18 RX ORDER — FLUOXETINE HYDROCHLORIDE 40 MG/1
80 CAPSULE ORAL DAILY
Refills: 0 | COMMUNITY
Start: 2018-05-24 | End: 2018-08-07 | Stop reason: SDUPTHER

## 2018-07-18 RX ORDER — RISPERIDONE 0.5 MG/1
0.5 TABLET, FILM COATED ORAL 2 TIMES DAILY
Refills: 0 | COMMUNITY
Start: 2018-05-24 | End: 2018-09-25

## 2018-07-18 RX ORDER — LEVOTHYROXINE SODIUM 175 UG/1
175 TABLET ORAL DAILY
Qty: 90 TABLET | Refills: 0 | Status: CANCELLED | OUTPATIENT
Start: 2018-07-18
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
## 2018-07-19 ENCOUNTER — HOSPITAL ENCOUNTER (OUTPATIENT)
Dept: RADIOLOGY | Facility: MEDICAL CENTER | Age: 38
Discharge: HOME | End: 2018-07-19
Payer: COMMERCIAL

## 2018-07-19 DIAGNOSIS — C73 PAPILLARY THYROID CARCINOMA (HCC): ICD-10-CM

## 2018-07-19 PROCEDURE — 76536 US EXAM OF HEAD AND NECK: CPT

## 2018-07-19 NOTE — TELEPHONE ENCOUNTER
He could not come in today since he has 2 sick children so I overbooked you for tomorrow @ 1:15pm  Thanks!

## 2018-07-20 ENCOUNTER — OFFICE VISIT (OUTPATIENT)
Dept: FAMILY MEDICINE CLINIC | Facility: CLINIC | Age: 38
End: 2018-07-20
Payer: COMMERCIAL

## 2018-07-20 VITALS
WEIGHT: 294 LBS | BODY MASS INDEX: 39.82 KG/M2 | DIASTOLIC BLOOD PRESSURE: 92 MMHG | HEART RATE: 78 BPM | RESPIRATION RATE: 16 BRPM | HEIGHT: 72 IN | SYSTOLIC BLOOD PRESSURE: 134 MMHG

## 2018-07-20 DIAGNOSIS — R63.5 WEIGHT GAIN: ICD-10-CM

## 2018-07-20 DIAGNOSIS — R06.00 DYSPNEA ON EXERTION: ICD-10-CM

## 2018-07-20 DIAGNOSIS — J02.9 SORE THROAT: ICD-10-CM

## 2018-07-20 DIAGNOSIS — F31.70 BIPOLAR DISORDER IN FULL REMISSION, MOST RECENT EPISODE UNSPECIFIED TYPE (HCC): ICD-10-CM

## 2018-07-20 DIAGNOSIS — E89.0 HYPOTHYROIDISM, POSTSURGICAL: ICD-10-CM

## 2018-07-20 DIAGNOSIS — C73 PAPILLARY CARCINOMA OF THYROID (HCC): Primary | ICD-10-CM

## 2018-07-20 DIAGNOSIS — R60.0 EDEMA EXTREMITIES: ICD-10-CM

## 2018-07-20 DIAGNOSIS — R79.89 LOW TESTOSTERONE: ICD-10-CM

## 2018-07-20 DIAGNOSIS — R73.01 IMPAIRED FASTING GLUCOSE: ICD-10-CM

## 2018-07-20 PROBLEM — R45.851 SUICIDAL IDEATIONS: Status: RESOLVED | Noted: 2017-03-10 | Resolved: 2018-07-20

## 2018-07-20 LAB — S PYO AG THROAT QL: NEGATIVE

## 2018-07-20 PROCEDURE — 87880 STREP A ASSAY W/OPTIC: CPT | Performed by: FAMILY MEDICINE

## 2018-07-20 PROCEDURE — 3210F GRP A STREP TEST PERFORMED: CPT | Performed by: FAMILY MEDICINE

## 2018-07-20 PROCEDURE — 99215 OFFICE O/P EST HI 40 MIN: CPT | Performed by: FAMILY MEDICINE

## 2018-07-20 RX ORDER — HYDROCHLOROTHIAZIDE 25 MG/1
25 TABLET ORAL DAILY
Qty: 90 TABLET | Refills: 0 | Status: SHIPPED | OUTPATIENT
Start: 2018-07-20 | End: 2018-10-20 | Stop reason: SDUPTHER
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
## 2018-07-23 ENCOUNTER — HOSPITAL ENCOUNTER (OUTPATIENT)
Dept: RADIOLOGY | Age: 38
Discharge: HOME | End: 2018-07-23

## 2018-07-23 DIAGNOSIS — C73 PAPILLARY THYROID CARCINOMA (HCC): ICD-10-CM

## 2018-07-24 NOTE — PROGRESS NOTES
Please inform patient that Neck ultrasound did not show recurrent mass , no evidence of recurrent or metastatic disease, normal looking lymph nodes present    He should follow-up with nuclear Medicine for whole-body scan

## 2018-08-02 ENCOUNTER — OFFICE VISIT (OUTPATIENT)
Dept: CARDIOLOGY CLINIC | Facility: CLINIC | Age: 38
End: 2018-08-02
Payer: COMMERCIAL

## 2018-08-02 VITALS
DIASTOLIC BLOOD PRESSURE: 86 MMHG | HEIGHT: 72 IN | WEIGHT: 294.7 LBS | SYSTOLIC BLOOD PRESSURE: 128 MMHG | BODY MASS INDEX: 39.91 KG/M2 | HEART RATE: 93 BPM

## 2018-08-02 DIAGNOSIS — E78.5 HYPERLIPIDEMIA, UNSPECIFIED HYPERLIPIDEMIA TYPE: ICD-10-CM

## 2018-08-02 DIAGNOSIS — R00.2 PALPITATION: ICD-10-CM

## 2018-08-02 DIAGNOSIS — R60.9 EDEMA, UNSPECIFIED TYPE: ICD-10-CM

## 2018-08-02 DIAGNOSIS — R06.02 SOB (SHORTNESS OF BREATH): Primary | ICD-10-CM

## 2018-08-02 PROCEDURE — 93000 ELECTROCARDIOGRAM COMPLETE: CPT | Performed by: INTERNAL MEDICINE

## 2018-08-02 PROCEDURE — 99203 OFFICE O/P NEW LOW 30 MIN: CPT | Performed by: INTERNAL MEDICINE
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
## 2018-08-07 DIAGNOSIS — F33.41 RECURRENT MAJOR DEPRESSIVE DISORDER, IN PARTIAL REMISSION (HCC): Primary | ICD-10-CM

## 2018-08-07 RX ORDER — FLUOXETINE HYDROCHLORIDE 40 MG/1
80 CAPSULE ORAL DAILY
Qty: 60 CAPSULE | Refills: 0 | Status: SHIPPED | OUTPATIENT
Start: 2018-08-07 | End: 2018-09-05 | Stop reason: SDUPTHER

## 2018-08-07 NOTE — TELEPHONE ENCOUNTER
REFILL FLUoxetine (PROzac) 40 MG capsule Sig: Take 80 mg by mouth daily DIONY IS IN BETWEEN PSYCHIATRIST RIGHT NOW AND STATED THAT YOU HAVE FILLED FOR HIM IN THE PAST  LAST OV 07/20/2018   PHARMACY - [facility]   PLEASE ADVISE   Script put through

## 2018-08-21 ENCOUNTER — HOSPITAL ENCOUNTER (OUTPATIENT)
Dept: RADIOLOGY | Age: 38
Discharge: HOME | End: 2018-08-21
Attending: INTERNAL MEDICINE
Payer: COMMERCIAL

## 2018-08-21 DIAGNOSIS — C73 PAPILLARY THYROID CARCINOMA (HCC): ICD-10-CM

## 2018-08-21 PROCEDURE — 96372 THER/PROPH/DIAG INJ SC/IM: CPT

## 2018-08-21 RX ADMIN — THYROTROPIN ALFA 0.9 MG: 0.9 INJECTION, POWDER, FOR SOLUTION INTRAMUSCULAR at 09:10

## 2018-08-22 ENCOUNTER — HOSPITAL ENCOUNTER (OUTPATIENT)
Dept: RADIOLOGY | Age: 38
Discharge: HOME | End: 2018-08-22
Attending: INTERNAL MEDICINE
Payer: COMMERCIAL

## 2018-08-22 PROCEDURE — 96372 THER/PROPH/DIAG INJ SC/IM: CPT

## 2018-08-22 RX ADMIN — THYROTROPIN ALFA 0.9 MG: 0.9 INJECTION, POWDER, FOR SOLUTION INTRAMUSCULAR at 09:00

## 2018-08-23 ENCOUNTER — HOSPITAL ENCOUNTER (OUTPATIENT)
Dept: RADIOLOGY | Facility: HOSPITAL | Age: 38
Discharge: HOME | End: 2018-08-23
Payer: COMMERCIAL

## 2018-08-23 ENCOUNTER — APPOINTMENT (OUTPATIENT)
Dept: RADIOLOGY | Age: 38
End: 2018-08-23
Attending: INTERNAL MEDICINE
Payer: COMMERCIAL

## 2018-08-23 PROCEDURE — 78018 THYROID MET IMAGING BODY: CPT

## 2018-08-23 PROCEDURE — A9509 IODINE I-123 SOD IODIDE MIL: HCPCS

## 2018-08-25 ENCOUNTER — TRANSCRIBE ORDERS (OUTPATIENT)
Dept: LAB | Facility: HOSPITAL | Age: 38
End: 2018-08-25

## 2018-08-25 ENCOUNTER — LAB (OUTPATIENT)
Dept: LAB | Facility: HOSPITAL | Age: 38
End: 2018-08-25
Attending: INTERNAL MEDICINE
Payer: COMMERCIAL

## 2018-08-25 DIAGNOSIS — C73 PAPILLARY THYROID CARCINOMA (HCC): ICD-10-CM

## 2018-08-25 PROCEDURE — 84432 ASSAY OF THYROGLOBULIN: CPT

## 2018-08-25 PROCEDURE — 86800 THYROGLOBULIN ANTIBODY: CPT

## 2018-08-25 PROCEDURE — 36415 COLL VENOUS BLD VENIPUNCTURE: CPT

## 2018-08-28 LAB
THYROGLOB AB SERPL-ACNC: <1 IU/ML (ref 0–0.9)
THYROGLOB SERPL-MCNC: 4.3 NG/ML (ref 1.4–29.2)

## 2018-08-30 ENCOUNTER — TELEPHONE (OUTPATIENT)
Dept: ENDOCRINOLOGY | Facility: CLINIC | Age: 38
End: 2018-08-30

## 2018-08-30 DIAGNOSIS — C73 THYROID CANCER (HCC): Primary | ICD-10-CM

## 2018-08-30 NOTE — TELEPHONE ENCOUNTER
Called pt and discussed the results of thyroglobulin tumor marker which is 4 3 , trended down from 5 0   Discussed it is assuring, but as TG is still detectable and Whole body scan is negative, will obtain PET/CT scan ( whole body) to r/o  metastatic disease other than neck   He asked to give it to cris     Will put the order   Please give it to pt       Isidoro Gottron, MD

## 2018-09-05 ENCOUNTER — DOCUMENTATION (OUTPATIENT)
Dept: ENDOCRINOLOGY | Facility: CLINIC | Age: 38
End: 2018-09-05

## 2018-09-05 DIAGNOSIS — F33.41 RECURRENT MAJOR DEPRESSIVE DISORDER, IN PARTIAL REMISSION (HCC): ICD-10-CM

## 2018-09-05 RX ORDER — FLUOXETINE HYDROCHLORIDE 40 MG/1
CAPSULE ORAL
Qty: 60 CAPSULE | Refills: 0 | Status: SHIPPED | OUTPATIENT
Start: 2018-09-05 | End: 2018-10-14 | Stop reason: SDUPTHER

## 2018-09-10 ENCOUNTER — HOSPITAL ENCOUNTER (OUTPATIENT)
Dept: RADIOLOGY | Age: 38
Discharge: HOME | End: 2018-09-10
Payer: COMMERCIAL

## 2018-09-10 ENCOUNTER — TELEPHONE (OUTPATIENT)
Dept: ENDOCRINOLOGY | Facility: CLINIC | Age: 38
End: 2018-09-10

## 2018-09-10 ENCOUNTER — OFFICE VISIT (OUTPATIENT)
Dept: SLEEP CENTER | Facility: CLINIC | Age: 38
End: 2018-09-10
Payer: COMMERCIAL

## 2018-09-10 VITALS
HEIGHT: 72 IN | SYSTOLIC BLOOD PRESSURE: 124 MMHG | DIASTOLIC BLOOD PRESSURE: 86 MMHG | WEIGHT: 298.8 LBS | HEART RATE: 88 BPM | BODY MASS INDEX: 40.47 KG/M2

## 2018-09-10 DIAGNOSIS — G44.229 CHRONIC TENSION-TYPE HEADACHE, NOT INTRACTABLE: ICD-10-CM

## 2018-09-10 DIAGNOSIS — R06.02 SOB (SHORTNESS OF BREATH): ICD-10-CM

## 2018-09-10 DIAGNOSIS — R00.2 PALPITATION: ICD-10-CM

## 2018-09-10 DIAGNOSIS — R63.5 WEIGHT GAIN: ICD-10-CM

## 2018-09-10 DIAGNOSIS — G47.00 INSOMNIA, UNSPECIFIED TYPE: ICD-10-CM

## 2018-09-10 DIAGNOSIS — C73 THYROID CANCER (HCC): ICD-10-CM

## 2018-09-10 DIAGNOSIS — E66.01 MORBID OBESITY WITH BMI OF 40.0-44.9, ADULT (HCC): ICD-10-CM

## 2018-09-10 DIAGNOSIS — Z72.0 TOBACCO ABUSE: ICD-10-CM

## 2018-09-10 DIAGNOSIS — G47.10 HYPERSOMNIA: ICD-10-CM

## 2018-09-10 DIAGNOSIS — F33.2 SEVERE EPISODE OF RECURRENT MAJOR DEPRESSIVE DISORDER, WITHOUT PSYCHOTIC FEATURES (HCC): ICD-10-CM

## 2018-09-10 DIAGNOSIS — G47.33 OSA (OBSTRUCTIVE SLEEP APNEA): Primary | ICD-10-CM

## 2018-09-10 DIAGNOSIS — E03.9 HYPOTHYROIDISM (ACQUIRED): ICD-10-CM

## 2018-09-10 DIAGNOSIS — F19.10 SUBSTANCE ABUSE (HCC): ICD-10-CM

## 2018-09-10 LAB — GLUCOSE SERPL-MCNC: 198 MG/DL (ref 65–140)

## 2018-09-10 PROCEDURE — 99244 OFF/OP CNSLTJ NEW/EST MOD 40: CPT | Performed by: INTERNAL MEDICINE

## 2018-09-10 PROCEDURE — 82948 REAGENT STRIP/BLOOD GLUCOSE: CPT

## 2018-09-10 NOTE — PROGRESS NOTES
Review of Systems      Genitourinary none   Cardiology palpitations/fluttering feeling in the chest and ankle/leg swelling   Gastrointestinal frequent heartburn/acid reflux   Neurology frequent headaches, muscle weakness, numbness/tingling of an extremity, forgetfulness, poor concentration or confusion,  and difficulty with memory   Constitutional claustrophobia, fatigue and weight change   Integumentary rash or dry skin   Psychiatry depression   Musculoskeletal joint pain and muscle aches   Pulmonary shortness of breath with activity, snoring and difficulty breathing when lying flat    ENT none   Endocrine excessive thirst   Hematological none
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
## 2018-09-10 NOTE — TELEPHONE ENCOUNTER
Today Test PET scan was cancelled as blood sugar was 190 mg/dl     Please inform pt to do blood work as ordered, Hba1c, and ACTH, cortisol,albumin , [address] , LH, prolactin all blood work is in epic     Based on blood work ,will have to start medications for blood sugar control   Thanks     Lab Results   Component Value Date    CREATININE 0 96 04/07/2018     Angeline Hicks MD

## 2018-09-12 ENCOUNTER — HOSPITAL ENCOUNTER (OUTPATIENT)
Dept: NON INVASIVE DIAGNOSTICS | Facility: CLINIC | Age: 38
Discharge: HOME | End: 2018-09-12
Attending: INTERNAL MEDICINE
Payer: COMMERCIAL

## 2018-09-12 ENCOUNTER — HOSPITAL ENCOUNTER (OUTPATIENT)
Dept: RADIOLOGY | Age: 38
Discharge: HOME | End: 2018-09-12
Attending: INTERNAL MEDICINE
Payer: COMMERCIAL

## 2018-09-12 DIAGNOSIS — C73 MALIGNANT NEOPLASM OF THYROID GLAND (HCC): ICD-10-CM

## 2018-09-12 DIAGNOSIS — R06.02 SOB (SHORTNESS OF BREATH): ICD-10-CM

## 2018-09-12 PROCEDURE — 93306 TTE W/DOPPLER COMPLETE: CPT

## 2018-09-12 RX ADMIN — THYROTROPIN ALFA 0.9 MG: 0.9 INJECTION, POWDER, FOR SOLUTION INTRAMUSCULAR at 09:49

## 2018-09-13 ENCOUNTER — HOSPITAL ENCOUNTER (OUTPATIENT)
Dept: RADIOLOGY | Age: 38
Discharge: HOME | End: 2018-09-13
Attending: INTERNAL MEDICINE
Payer: COMMERCIAL

## 2018-09-13 PROCEDURE — 96372 THER/PROPH/DIAG INJ SC/IM: CPT

## 2018-09-13 PROCEDURE — 93306 TTE W/DOPPLER COMPLETE: CPT | Performed by: INTERNAL MEDICINE

## 2018-09-13 RX ADMIN — THYROTROPIN ALFA 0.9 MG: 0.9 INJECTION, POWDER, FOR SOLUTION INTRAMUSCULAR at 09:54

## 2018-09-14 ENCOUNTER — TELEPHONE (OUTPATIENT)
Dept: ENDOCRINOLOGY | Facility: CLINIC | Age: 38
End: 2018-09-14

## 2018-09-14 ENCOUNTER — HOSPITAL ENCOUNTER (OUTPATIENT)
Dept: RADIOLOGY | Age: 38
Discharge: HOME | End: 2018-09-14
Payer: COMMERCIAL

## 2018-09-14 LAB — GLUCOSE SERPL-MCNC: 132 MG/DL (ref 65–140)

## 2018-09-14 PROCEDURE — 82948 REAGENT STRIP/BLOOD GLUCOSE: CPT

## 2018-09-14 PROCEDURE — A9552 F18 FDG: HCPCS

## 2018-09-14 PROCEDURE — 78815 PET IMAGE W/CT SKULL-THIGH: CPT

## 2018-09-14 RX ORDER — LORAZEPAM 2 MG/ML
1 INJECTION INTRAMUSCULAR
Status: COMPLETED | OUTPATIENT
Start: 2018-09-14 | End: 2018-09-14

## 2018-09-14 RX ADMIN — LORAZEPAM 1 MG: 2 INJECTION INTRAMUSCULAR; INTRAVENOUS at 10:20

## 2018-09-14 RX ADMIN — IOHEXOL 5 ML: 240 INJECTION, SOLUTION INTRATHECAL; INTRAVASCULAR; INTRAVENOUS; ORAL at 10:30

## 2018-09-14 NOTE — PROGRESS NOTES
Please inform patient that PET scan result was normal, no suspicious findings ,he should complete blood work which was ordered previously

## 2018-09-14 NOTE — TELEPHONE ENCOUNTER
----- Message from Andres Jay MD sent at 9/14/2018  2:20 PM EDT -----  Please inform patient that PET scan result was normal, no suspicious findings ,he should complete blood work which was ordered previously

## 2018-09-17 ENCOUNTER — LAB (OUTPATIENT)
Dept: LAB | Facility: MEDICAL CENTER | Age: 38
End: 2018-09-17
Payer: COMMERCIAL

## 2018-09-17 DIAGNOSIS — R73.03 PREDIABETES: ICD-10-CM

## 2018-09-17 DIAGNOSIS — R03.0 ELEVATED BLOOD PRESSURE READING WITHOUT DIAGNOSIS OF HYPERTENSION: ICD-10-CM

## 2018-09-17 DIAGNOSIS — E27.40 LOW SERUM CORTISOL LEVEL (HCC): ICD-10-CM

## 2018-09-17 DIAGNOSIS — E29.1 HYPOGONADISM MALE: ICD-10-CM

## 2018-09-17 DIAGNOSIS — E78.5 HYPERLIPIDEMIA, UNSPECIFIED HYPERLIPIDEMIA TYPE: ICD-10-CM

## 2018-09-17 DIAGNOSIS — E55.9 VITAMIN D DEFICIENCY: ICD-10-CM

## 2018-09-17 DIAGNOSIS — C73 THYROID CA (HCC): ICD-10-CM

## 2018-09-17 DIAGNOSIS — R79.89 LOW TESTOSTERONE IN MALE: ICD-10-CM

## 2018-09-17 LAB
25(OH)D3 SERPL-MCNC: 26.8 NG/ML (ref 30–100)
ALBUMIN SERPL BCP-MCNC: 3.5 G/DL (ref 3.5–5)
ALP SERPL-CCNC: 103 U/L (ref 46–116)
ALT SERPL W P-5'-P-CCNC: 44 U/L (ref 12–78)
ANION GAP SERPL CALCULATED.3IONS-SCNC: 7 MMOL/L (ref 4–13)
AST SERPL W P-5'-P-CCNC: 22 U/L (ref 5–45)
BILIRUB SERPL-MCNC: 0.43 MG/DL (ref 0.2–1)
BUN SERPL-MCNC: 14 MG/DL (ref 5–25)
CALCIUM SERPL-MCNC: 8.8 MG/DL (ref 8.3–10.1)
CHLORIDE SERPL-SCNC: 104 MMOL/L (ref 100–108)
CHOLEST SERPL-MCNC: 247 MG/DL (ref 50–200)
CO2 SERPL-SCNC: 24 MMOL/L (ref 21–32)
CORTIS AM PEAK SERPL-MCNC: 6.9 UG/DL (ref 4.2–22.4)
CREAT SERPL-MCNC: 0.81 MG/DL (ref 0.6–1.3)
EST. AVERAGE GLUCOSE BLD GHB EST-MCNC: 146 MG/DL
FSH SERPL-ACNC: 4.5 MIU/ML (ref 0.7–10.8)
GFR SERPL CREATININE-BSD FRML MDRD: 111 ML/MIN/1.73SQ M
GLUCOSE P FAST SERPL-MCNC: 129 MG/DL (ref 65–99)
HBA1C MFR BLD: 6.7 % (ref 4.2–6.3)
HDLC SERPL-MCNC: 32 MG/DL (ref 40–60)
LDLC SERPL CALC-MCNC: 164 MG/DL (ref 0–100)
LH SERPL-ACNC: 2.4 MIU/ML (ref 1.2–10.6)
POTASSIUM SERPL-SCNC: 3.8 MMOL/L (ref 3.5–5.3)
PROLACTIN SERPL-MCNC: 9.5 NG/ML (ref 2.5–17.4)
PROT SERPL-MCNC: 7.4 G/DL (ref 6.4–8.2)
SODIUM SERPL-SCNC: 135 MMOL/L (ref 136–145)
TRIGL SERPL-MCNC: 253 MG/DL

## 2018-09-17 PROCEDURE — 83036 HEMOGLOBIN GLYCOSYLATED A1C: CPT

## 2018-09-17 PROCEDURE — 84305 ASSAY OF SOMATOMEDIN: CPT

## 2018-09-17 PROCEDURE — 82024 ASSAY OF ACTH: CPT

## 2018-09-17 PROCEDURE — 80061 LIPID PANEL: CPT

## 2018-09-17 PROCEDURE — 36415 COLL VENOUS BLD VENIPUNCTURE: CPT

## 2018-09-17 PROCEDURE — 83002 ASSAY OF GONADOTROPIN (LH): CPT

## 2018-09-17 PROCEDURE — 84402 ASSAY OF FREE TESTOSTERONE: CPT

## 2018-09-17 PROCEDURE — 84146 ASSAY OF PROLACTIN: CPT

## 2018-09-17 PROCEDURE — 82306 VITAMIN D 25 HYDROXY: CPT

## 2018-09-17 PROCEDURE — 83001 ASSAY OF GONADOTROPIN (FSH): CPT

## 2018-09-17 PROCEDURE — 84403 ASSAY OF TOTAL TESTOSTERONE: CPT

## 2018-09-17 PROCEDURE — 82533 TOTAL CORTISOL: CPT

## 2018-09-17 PROCEDURE — 80053 COMPREHEN METABOLIC PANEL: CPT

## 2018-09-18 LAB
ACTH PLAS-MCNC: 13.4 PG/ML (ref 7.2–63.3)
IGF-I SERPL-MCNC: 81 NG/ML (ref 83–233)
TESTOST FREE SERPL-MCNC: 4.7 PG/ML (ref 6.8–21.5)
TESTOST SERPL-MCNC: 119 NG/DL (ref 264–916)

## 2018-09-19 ENCOUNTER — TELEPHONE (OUTPATIENT)
Dept: ENDOCRINOLOGY | Facility: CLINIC | Age: 38
End: 2018-09-19

## 2018-09-19 NOTE — TELEPHONE ENCOUNTER
----- Message from Annette Romano MD sent at 9/19/2018  9:48 AM EDT -----  Called pt got answering machine , need to discuss pituitary hormonal work results, also A1c suggestive of diabetes     Please make appointment to outline next steps

## 2018-09-25 ENCOUNTER — OFFICE VISIT (OUTPATIENT)
Dept: ENDOCRINOLOGY | Facility: CLINIC | Age: 38
End: 2018-09-25
Payer: COMMERCIAL

## 2018-09-25 VITALS
DIASTOLIC BLOOD PRESSURE: 90 MMHG | WEIGHT: 301.6 LBS | BODY MASS INDEX: 40.85 KG/M2 | SYSTOLIC BLOOD PRESSURE: 132 MMHG | HEART RATE: 62 BPM | HEIGHT: 72 IN

## 2018-09-25 DIAGNOSIS — E78.5 HYPERLIPIDEMIA, UNSPECIFIED HYPERLIPIDEMIA TYPE: ICD-10-CM

## 2018-09-25 DIAGNOSIS — E55.9 VITAMIN D DEFICIENCY: ICD-10-CM

## 2018-09-25 DIAGNOSIS — E27.40 LOW SERUM CORTISOL LEVEL (HCC): Primary | ICD-10-CM

## 2018-09-25 DIAGNOSIS — E29.1 HYPOGONADISM MALE: ICD-10-CM

## 2018-09-25 DIAGNOSIS — C73 PAPILLARY THYROID CARCINOMA (HCC): ICD-10-CM

## 2018-09-25 DIAGNOSIS — R79.89 LOW TESTOSTERONE IN MALE: ICD-10-CM

## 2018-09-25 DIAGNOSIS — E11.9 TYPE 2 DIABETES MELLITUS WITHOUT COMPLICATION, WITHOUT LONG-TERM CURRENT USE OF INSULIN (HCC): ICD-10-CM

## 2018-09-25 DIAGNOSIS — E89.0 POSTOPERATIVE HYPOTHYROIDISM: ICD-10-CM

## 2018-09-25 PROCEDURE — 99215 OFFICE O/P EST HI 40 MIN: CPT | Performed by: INTERNAL MEDICINE

## 2018-09-25 RX ORDER — ACETAMINOPHEN 160 MG
TABLET,DISINTEGRATING ORAL
Refills: 0
Start: 2018-09-25 | End: 2019-07-26 | Stop reason: ALTCHOICE

## 2018-09-25 RX ORDER — METFORMIN HYDROCHLORIDE 500 MG/1
500 TABLET, EXTENDED RELEASE ORAL 2 TIMES DAILY WITH MEALS
Qty: 60 TABLET | Refills: 4 | Status: SHIPPED | OUTPATIENT
Start: 2018-09-25 | End: 2019-02-21 | Stop reason: SDUPTHER

## 2018-09-25 RX ORDER — TESTOSTERONE 40.5 MG/2.5G
GEL TOPICAL
Qty: 88 G | Refills: 3 | Status: SHIPPED | OUTPATIENT
Start: 2018-09-25 | End: 2018-12-07 | Stop reason: SDUPTHER

## 2018-09-25 RX ORDER — ATORVASTATIN CALCIUM 10 MG/1
10 TABLET, FILM COATED ORAL DAILY
Qty: 30 TABLET | Refills: 4 | Status: SHIPPED | OUTPATIENT
Start: 2018-09-25 | End: 2019-02-21 | Stop reason: SDUPTHER
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
## 2018-09-27 DIAGNOSIS — E11.9 TYPE 2 DIABETES MELLITUS WITHOUT COMPLICATION, WITHOUT LONG-TERM CURRENT USE OF INSULIN (HCC): Primary | ICD-10-CM

## 2018-09-27 RX ORDER — BLOOD-GLUCOSE METER
KIT MISCELLANEOUS
Qty: 1 EACH | Refills: 0 | Status: SHIPPED | OUTPATIENT
Start: 2018-09-27 | End: 2020-04-05

## 2018-10-03 ENCOUNTER — TELEPHONE (OUTPATIENT)
Dept: ENDOCRINOLOGY | Facility: CLINIC | Age: 38
End: 2018-10-03

## 2018-10-03 ENCOUNTER — DOCUMENTATION (OUTPATIENT)
Dept: ENDOCRINOLOGY | Facility: CLINIC | Age: 38
End: 2018-10-03

## 2018-10-03 NOTE — TELEPHONE ENCOUNTER
Spoke to Jones Estrella at [employer] to prior [facility] MRI  It was not approved, you have to call and do a peer to peer   Telephone number is   Form for PA scanned into pt's chart

## 2018-10-08 ENCOUNTER — TELEPHONE (OUTPATIENT)
Dept: ENDOCRINOLOGY | Facility: CLINIC | Age: 38
End: 2018-10-08

## 2018-10-08 ENCOUNTER — HOSPITAL ENCOUNTER (OUTPATIENT)
Dept: MRI IMAGING | Facility: HOSPITAL | Age: 38
Discharge: HOME | End: 2018-10-08
Attending: INTERNAL MEDICINE
Payer: COMMERCIAL

## 2018-10-08 DIAGNOSIS — R79.89 LOW TESTOSTERONE IN MALE: ICD-10-CM

## 2018-10-08 PROCEDURE — A9585 GADOBUTROL INJECTION: HCPCS | Performed by: INTERNAL MEDICINE

## 2018-10-08 PROCEDURE — 70553 MRI BRAIN STEM W/O & W/DYE: CPT

## 2018-10-08 RX ADMIN — GADOBUTROL 13 ML: 604.72 INJECTION INTRAVENOUS at 08:53

## 2018-10-08 NOTE — TELEPHONE ENCOUNTER
----- Message from Aldo Almazan MD sent at 10/8/2018  3:08 PM EDT -----  Please inform patient that MRI of brain is normal, Pituitary gland is normal ( no mass)

## 2018-10-14 DIAGNOSIS — F33.41 RECURRENT MAJOR DEPRESSIVE DISORDER, IN PARTIAL REMISSION (HCC): ICD-10-CM

## 2018-10-14 RX ORDER — FLUOXETINE HYDROCHLORIDE 40 MG/1
CAPSULE ORAL
Qty: 60 CAPSULE | Refills: 0 | Status: SHIPPED | OUTPATIENT
Start: 2018-10-14 | End: 2018-11-14 | Stop reason: SDUPTHER

## 2018-10-20 DIAGNOSIS — R60.0 EDEMA EXTREMITIES: ICD-10-CM

## 2018-10-20 RX ORDER — HYDROCHLOROTHIAZIDE 25 MG/1
TABLET ORAL
Qty: 90 TABLET | Refills: 0 | Status: SHIPPED | OUTPATIENT
Start: 2018-10-20 | End: 2019-01-21 | Stop reason: SDUPTHER

## 2018-11-01 ENCOUNTER — TELEPHONE (OUTPATIENT)
Dept: ENDOCRINOLOGY | Facility: CLINIC | Age: 38
End: 2018-11-01

## 2018-11-01 NOTE — TELEPHONE ENCOUNTER
Pt called in said that Androgel isn't covered anymore called his insurance they said a generic brand would be covered

## 2018-11-07 DIAGNOSIS — R63.5 WEIGHT GAIN: ICD-10-CM

## 2018-11-07 DIAGNOSIS — R40.0 DAYTIME SLEEPINESS: Primary | ICD-10-CM

## 2018-11-14 DIAGNOSIS — F33.41 RECURRENT MAJOR DEPRESSIVE DISORDER, IN PARTIAL REMISSION (HCC): ICD-10-CM

## 2018-11-14 RX ORDER — FLUOXETINE HYDROCHLORIDE 40 MG/1
CAPSULE ORAL
Qty: 60 CAPSULE | Refills: 0 | Status: SHIPPED | OUTPATIENT
Start: 2018-11-14 | End: 2018-12-13 | Stop reason: SDUPTHER

## 2018-11-17 ENCOUNTER — APPOINTMENT (OUTPATIENT)
Dept: LAB | Facility: MEDICAL CENTER | Age: 38
End: 2018-11-17
Payer: COMMERCIAL

## 2018-11-17 DIAGNOSIS — E27.40 LOW SERUM CORTISOL LEVEL (HCC): ICD-10-CM

## 2018-11-17 LAB
ALBUMIN SERPL BCP-MCNC: 3.8 G/DL (ref 3.5–5)
ALP SERPL-CCNC: 88 U/L (ref 46–116)
ALT SERPL W P-5'-P-CCNC: 40 U/L (ref 12–78)
ANION GAP SERPL CALCULATED.3IONS-SCNC: 4 MMOL/L (ref 4–13)
AST SERPL W P-5'-P-CCNC: 22 U/L (ref 5–45)
BILIRUB SERPL-MCNC: 0.38 MG/DL (ref 0.2–1)
BUN SERPL-MCNC: 13 MG/DL (ref 5–25)
CALCIUM SERPL-MCNC: 8.5 MG/DL (ref 8.3–10.1)
CHLORIDE SERPL-SCNC: 102 MMOL/L (ref 100–108)
CO2 SERPL-SCNC: 27 MMOL/L (ref 21–32)
CREAT SERPL-MCNC: 0.76 MG/DL (ref 0.6–1.3)
ERYTHROCYTE [DISTWIDTH] IN BLOOD BY AUTOMATED COUNT: 14.2 % (ref 11.6–15.1)
GFR SERPL CREATININE-BSD FRML MDRD: 114 ML/MIN/1.73SQ M
GLUCOSE P FAST SERPL-MCNC: 133 MG/DL (ref 65–99)
HCT VFR BLD AUTO: 45.5 % (ref 36.5–49.3)
HGB BLD-MCNC: 14 G/DL (ref 12–17)
MCH RBC QN AUTO: 27.7 PG (ref 26.8–34.3)
MCHC RBC AUTO-ENTMCNC: 30.8 G/DL (ref 31.4–37.4)
MCV RBC AUTO: 90 FL (ref 82–98)
PLATELET # BLD AUTO: 266 THOUSANDS/UL (ref 149–390)
PMV BLD AUTO: 9.6 FL (ref 8.9–12.7)
POTASSIUM SERPL-SCNC: 3.9 MMOL/L (ref 3.5–5.3)
PROT SERPL-MCNC: 7.1 G/DL (ref 6.4–8.2)
PSA SERPL-MCNC: 0.4 NG/ML (ref 0–4)
RBC # BLD AUTO: 5.06 MILLION/UL (ref 3.88–5.62)
SODIUM SERPL-SCNC: 133 MMOL/L (ref 136–145)
WBC # BLD AUTO: 7.07 THOUSAND/UL (ref 4.31–10.16)

## 2018-11-17 PROCEDURE — 84270 ASSAY OF SEX HORMONE GLOBUL: CPT

## 2018-11-17 PROCEDURE — 84410 TESTOSTERONE BIOAVAILABLE: CPT

## 2018-11-17 PROCEDURE — 85027 COMPLETE CBC AUTOMATED: CPT

## 2018-11-17 PROCEDURE — G0103 PSA SCREENING: HCPCS

## 2018-11-17 PROCEDURE — 36415 COLL VENOUS BLD VENIPUNCTURE: CPT

## 2018-11-17 PROCEDURE — 80053 COMPREHEN METABOLIC PANEL: CPT

## 2018-11-20 ENCOUNTER — TELEPHONE (OUTPATIENT)
Dept: ENDOCRINOLOGY | Facility: CLINIC | Age: 38
End: 2018-11-20

## 2018-11-20 NOTE — TELEPHONE ENCOUNTER
----- Message from Logan Soriano MD sent at 11/20/2018  3:29 PM EST -----  Please call the patient regarding his blood work results, all blood work looks normal, testosterone is still pending, fasting blood sugar is 133

## 2018-11-27 LAB
SHBG SERPL-SCNC: 26.3 NMOL/L
TESTOST SERPL-MCNC: 342 NG/DL
TESTOSTERONE.FREE+WB MFR SERPL: 57 %
TESTOSTERONE.FREE+WB SERPL-MCNC: 195 NG/DL

## 2018-11-28 ENCOUNTER — TELEPHONE (OUTPATIENT)
Dept: ENDOCRINOLOGY | Facility: CLINIC | Age: 38
End: 2018-11-28

## 2018-12-04 ENCOUNTER — TELEPHONE (OUTPATIENT)
Dept: ENDOCRINOLOGY | Facility: CLINIC | Age: 38
End: 2018-12-04

## 2018-12-04 DIAGNOSIS — E29.1 HYPOGONADISM MALE: ICD-10-CM

## 2018-12-04 DIAGNOSIS — E27.40 LOW SERUM CORTISOL LEVEL (HCC): ICD-10-CM

## 2018-12-04 DIAGNOSIS — R53.83 FATIGUE, UNSPECIFIED TYPE: ICD-10-CM

## 2018-12-04 DIAGNOSIS — E03.9 HYPOTHYROIDISM, UNSPECIFIED TYPE: Primary | ICD-10-CM

## 2018-12-04 NOTE — TELEPHONE ENCOUNTER
Trice Banks called today said he has been feeling very fatigued lately for last couple of weeks  Wanted to speak with you  Please advise, thank you!

## 2018-12-05 NOTE — TELEPHONE ENCOUNTER
Lab Results   Component Value Date    [id number] 0 397 07/13/2018   Called pt and got answering machine    Left message to get AM cortisol, TSH , free t4 and fasting basic metabolic profile   Will call him in AM again     Dasha Hudson MD

## 2018-12-07 RX ORDER — TESTOSTERONE 40.5 MG/2.5G
GEL TOPICAL
Qty: 88 G | Refills: 3 | Status: SHIPPED | OUTPATIENT
Start: 2018-12-07 | End: 2019-07-26 | Stop reason: ALTCHOICE

## 2018-12-07 NOTE — TELEPHONE ENCOUNTER
Spoke with Pt , c/o feeling tired off and on   Discussed to get blood work done as ordered, will follow up on results  Also discussed to follow up with PCP for sleep apnea   Elena Little MD

## 2018-12-08 ENCOUNTER — APPOINTMENT (OUTPATIENT)
Dept: LAB | Facility: CLINIC | Age: 38
End: 2018-12-08
Payer: COMMERCIAL

## 2018-12-08 DIAGNOSIS — E89.0 POSTOPERATIVE HYPOTHYROIDISM: ICD-10-CM

## 2018-12-08 DIAGNOSIS — E11.9 TYPE 2 DIABETES MELLITUS WITHOUT COMPLICATION, WITHOUT LONG-TERM CURRENT USE OF INSULIN (HCC): ICD-10-CM

## 2018-12-08 DIAGNOSIS — E78.5 HYPERLIPIDEMIA, UNSPECIFIED HYPERLIPIDEMIA TYPE: ICD-10-CM

## 2018-12-08 LAB
ANION GAP SERPL CALCULATED.3IONS-SCNC: 7 MMOL/L (ref 4–13)
BUN SERPL-MCNC: 12 MG/DL (ref 5–25)
CALCIUM SERPL-MCNC: 9 MG/DL (ref 8.3–10.1)
CHLORIDE SERPL-SCNC: 103 MMOL/L (ref 100–108)
CHOLEST SERPL-MCNC: 171 MG/DL (ref 50–200)
CO2 SERPL-SCNC: 30 MMOL/L (ref 21–32)
CREAT SERPL-MCNC: 0.88 MG/DL (ref 0.6–1.3)
CREAT UR-MCNC: 137 MG/DL
EST. AVERAGE GLUCOSE BLD GHB EST-MCNC: 154 MG/DL
GFR SERPL CREATININE-BSD FRML MDRD: 107 ML/MIN/1.73SQ M
GLUCOSE P FAST SERPL-MCNC: 137 MG/DL (ref 65–99)
HBA1C MFR BLD: 7 % (ref 4.2–6.3)
HDLC SERPL-MCNC: 36 MG/DL (ref 40–60)
LDLC SERPL CALC-MCNC: 115 MG/DL (ref 0–100)
MICROALBUMIN UR-MCNC: 6.4 MG/L (ref 0–20)
MICROALBUMIN/CREAT 24H UR: 5 MG/G CREATININE (ref 0–30)
NONHDLC SERPL-MCNC: 135 MG/DL
POTASSIUM SERPL-SCNC: 4.1 MMOL/L (ref 3.5–5.3)
SODIUM SERPL-SCNC: 140 MMOL/L (ref 136–145)
T4 FREE SERPL-MCNC: 1.16 NG/DL (ref 0.76–1.46)
TRIGL SERPL-MCNC: 99 MG/DL
TSH SERPL DL<=0.05 MIU/L-ACNC: 0.25 UIU/ML (ref 0.36–3.74)

## 2018-12-08 PROCEDURE — 80061 LIPID PANEL: CPT

## 2018-12-08 PROCEDURE — 36415 COLL VENOUS BLD VENIPUNCTURE: CPT

## 2018-12-08 PROCEDURE — 80048 BASIC METABOLIC PNL TOTAL CA: CPT

## 2018-12-08 PROCEDURE — 82043 UR ALBUMIN QUANTITATIVE: CPT

## 2018-12-08 PROCEDURE — 82570 ASSAY OF URINE CREATININE: CPT

## 2018-12-08 PROCEDURE — 83036 HEMOGLOBIN GLYCOSYLATED A1C: CPT

## 2018-12-08 PROCEDURE — 3061F NEG MICROALBUMINURIA REV: CPT | Performed by: FAMILY MEDICINE

## 2018-12-08 PROCEDURE — 84439 ASSAY OF FREE THYROXINE: CPT

## 2018-12-08 PROCEDURE — 84443 ASSAY THYROID STIM HORMONE: CPT

## 2018-12-10 DIAGNOSIS — E55.9 VITAMIN D DEFICIENCY: ICD-10-CM

## 2018-12-10 RX ORDER — ERGOCALCIFEROL 1.25 MG/1
CAPSULE ORAL WEEKLY
Qty: 8 CAPSULE | Refills: 3 | Status: SHIPPED | OUTPATIENT
Start: 2018-12-10 | End: 2019-07-26 | Stop reason: ALTCHOICE

## 2018-12-12 DIAGNOSIS — E11.9 TYPE 2 DIABETES MELLITUS WITHOUT COMPLICATION, WITHOUT LONG-TERM CURRENT USE OF INSULIN (HCC): ICD-10-CM

## 2018-12-13 ENCOUNTER — TELEPHONE (OUTPATIENT)
Dept: ENDOCRINOLOGY | Facility: CLINIC | Age: 38
End: 2018-12-13

## 2018-12-13 DIAGNOSIS — E29.1 HYPOGONADISM IN MALE: ICD-10-CM

## 2018-12-13 DIAGNOSIS — F33.41 RECURRENT MAJOR DEPRESSIVE DISORDER, IN PARTIAL REMISSION (HCC): ICD-10-CM

## 2018-12-13 DIAGNOSIS — E89.0 POSTOPERATIVE HYPOTHYROIDISM: ICD-10-CM

## 2018-12-13 DIAGNOSIS — E27.40 LOW SERUM CORTISOL LEVEL (HCC): Primary | ICD-10-CM

## 2018-12-13 DIAGNOSIS — E03.8 OTHER SPECIFIED HYPOTHYROIDISM: ICD-10-CM

## 2018-12-13 RX ORDER — FLUOXETINE HYDROCHLORIDE 40 MG/1
CAPSULE ORAL
Qty: 60 CAPSULE | Refills: 0 | Status: SHIPPED | OUTPATIENT
Start: 2018-12-13 | End: 2018-12-21 | Stop reason: SDUPTHER

## 2018-12-13 NOTE — TELEPHONE ENCOUNTER
----- Message from Erasto Starr MD sent at 12/13/2018  9:47 AM EST -----  Discussed results with pt  Please mail labs to be done before next visit  He will need cortisol, ACTH, TSH and free t4, Testosterone and free testosterone, Comp panel, PSA  before next appt

## 2018-12-21 DIAGNOSIS — F33.41 RECURRENT MAJOR DEPRESSIVE DISORDER, IN PARTIAL REMISSION (HCC): ICD-10-CM

## 2018-12-21 RX ORDER — FLUOXETINE HYDROCHLORIDE 40 MG/1
80 CAPSULE ORAL DAILY
Qty: 60 CAPSULE | Refills: 0 | Status: SHIPPED | OUTPATIENT
Start: 2018-12-21 | End: 2019-02-14 | Stop reason: SDUPTHER

## 2019-01-21 DIAGNOSIS — E11.9 TYPE 2 DIABETES MELLITUS WITHOUT COMPLICATION, WITHOUT LONG-TERM CURRENT USE OF INSULIN (HCC): ICD-10-CM

## 2019-01-21 DIAGNOSIS — R60.0 EDEMA EXTREMITIES: ICD-10-CM

## 2019-01-21 RX ORDER — HYDROCHLOROTHIAZIDE 25 MG/1
TABLET ORAL
Qty: 90 TABLET | Refills: 0 | Status: SHIPPED | OUTPATIENT
Start: 2019-01-21 | End: 2019-05-29 | Stop reason: SDUPTHER

## 2019-02-13 DIAGNOSIS — E11.9 TYPE 2 DIABETES MELLITUS WITHOUT COMPLICATION, WITHOUT LONG-TERM CURRENT USE OF INSULIN (HCC): ICD-10-CM

## 2019-02-14 DIAGNOSIS — F33.41 RECURRENT MAJOR DEPRESSIVE DISORDER, IN PARTIAL REMISSION (HCC): ICD-10-CM

## 2019-02-15 RX ORDER — FLUOXETINE HYDROCHLORIDE 40 MG/1
80 CAPSULE ORAL DAILY
Qty: 60 CAPSULE | Refills: 0 | Status: SHIPPED | OUTPATIENT
Start: 2019-02-15 | End: 2019-03-20 | Stop reason: SDUPTHER

## 2019-02-21 DIAGNOSIS — E78.5 HYPERLIPIDEMIA, UNSPECIFIED HYPERLIPIDEMIA TYPE: ICD-10-CM

## 2019-02-21 DIAGNOSIS — E11.9 TYPE 2 DIABETES MELLITUS WITHOUT COMPLICATION, WITHOUT LONG-TERM CURRENT USE OF INSULIN (HCC): ICD-10-CM

## 2019-02-21 RX ORDER — ATORVASTATIN CALCIUM 10 MG/1
10 TABLET, FILM COATED ORAL DAILY
Qty: 30 TABLET | Refills: 4 | Status: SHIPPED | OUTPATIENT
Start: 2019-02-21 | End: 2019-10-05 | Stop reason: SDUPTHER

## 2019-02-21 RX ORDER — METFORMIN HYDROCHLORIDE 500 MG/1
TABLET, EXTENDED RELEASE ORAL
Qty: 60 TABLET | Refills: 4 | Status: SHIPPED | OUTPATIENT
Start: 2019-02-21 | End: 2019-10-05 | Stop reason: SDUPTHER

## 2019-03-20 DIAGNOSIS — F33.41 RECURRENT MAJOR DEPRESSIVE DISORDER, IN PARTIAL REMISSION (HCC): ICD-10-CM

## 2019-03-20 RX ORDER — FLUOXETINE HYDROCHLORIDE 40 MG/1
80 CAPSULE ORAL DAILY
Qty: 60 CAPSULE | Refills: 0 | Status: SHIPPED | OUTPATIENT
Start: 2019-03-20 | End: 2019-04-23 | Stop reason: SDUPTHER

## 2019-04-23 DIAGNOSIS — F33.41 RECURRENT MAJOR DEPRESSIVE DISORDER, IN PARTIAL REMISSION (HCC): ICD-10-CM

## 2019-04-23 RX ORDER — FLUOXETINE HYDROCHLORIDE 40 MG/1
80 CAPSULE ORAL DAILY
Qty: 60 CAPSULE | Refills: 0 | Status: SHIPPED | OUTPATIENT
Start: 2019-04-23 | End: 2019-05-30 | Stop reason: SDUPTHER

## 2019-05-15 DIAGNOSIS — E03.9 HYPOTHYROIDISM, UNSPECIFIED TYPE: ICD-10-CM

## 2019-05-16 RX ORDER — LEVOTHYROXINE SODIUM 175 UG/1
TABLET ORAL
Qty: 90 TABLET | Refills: 0 | Status: SHIPPED | OUTPATIENT
Start: 2019-05-16 | End: 2019-07-29

## 2019-05-29 DIAGNOSIS — R60.0 EDEMA EXTREMITIES: ICD-10-CM

## 2019-05-29 RX ORDER — HYDROCHLOROTHIAZIDE 25 MG/1
TABLET ORAL
Qty: 90 TABLET | Refills: 0 | Status: SHIPPED | OUTPATIENT
Start: 2019-05-29 | End: 2019-07-26 | Stop reason: ALTCHOICE

## 2019-05-30 DIAGNOSIS — F33.41 RECURRENT MAJOR DEPRESSIVE DISORDER, IN PARTIAL REMISSION (HCC): ICD-10-CM

## 2019-05-30 RX ORDER — FLUOXETINE HYDROCHLORIDE 40 MG/1
80 CAPSULE ORAL DAILY
Qty: 60 CAPSULE | Refills: 0 | Status: SHIPPED | OUTPATIENT
Start: 2019-05-30 | End: 2019-07-05 | Stop reason: SDUPTHER

## 2019-07-05 DIAGNOSIS — F33.41 RECURRENT MAJOR DEPRESSIVE DISORDER, IN PARTIAL REMISSION (HCC): ICD-10-CM

## 2019-07-05 RX ORDER — FLUOXETINE HYDROCHLORIDE 40 MG/1
80 CAPSULE ORAL DAILY
Qty: 60 CAPSULE | Refills: 0 | Status: SHIPPED | OUTPATIENT
Start: 2019-07-05 | End: 2019-08-08 | Stop reason: SDUPTHER

## 2019-07-11 ENCOUNTER — TELEPHONE (OUTPATIENT)
Dept: FAMILY MEDICINE CLINIC | Facility: CLINIC | Age: 39
End: 2019-07-11

## 2019-07-11 NOTE — TELEPHONE ENCOUNTER
Anneliese Shamar called for an appt with you  He has Skin Discoloration on his back, chest down to his groin area  He also has nodules on his hands, one is painful one is not  He would like you to check him out & is off on FRIDAYS, but you have nothing available for quite awhile  Is there a Friday you can fit him in? Thank you!

## 2019-07-18 ENCOUNTER — TELEPHONE (OUTPATIENT)
Dept: FAMILY MEDICINE CLINIC | Facility: CLINIC | Age: 39
End: 2019-07-18

## 2019-07-18 NOTE — TELEPHONE ENCOUNTER
Patient needed to reschedule his appointment that was originally tomorrow (7/19/19) but he had to cancel because of work  He is inquiring if he can be seen next Friday on 7/26/19? He says any time works for him  The appointment was for:  Skin Discoloration, Hand nodules    Please advise if I may double book? Thank you  Patient is also aware you return tomorrow

## 2019-07-19 NOTE — TELEPHONE ENCOUNTER
LMOM to see if 12:45 PM arrival for 1:00 PM appt works for him on 7/26/19   Asked to call back to confirm

## 2019-07-20 NOTE — TELEPHONE ENCOUNTER
----- Message from Butler Lanes, MD sent at 7/13/2018  2:32 PM EDT -----  Please call the patient regarding his results, please inform thyroid blood work is within normal limits, continue current dose of levothyroxine, vitamin-D is normal, continue current dose of vitamin-D supplementation, his a m  cortisol is slightly low, please ensure if his taking any steroids, or pain medications? He will need repeat a m  cortisol in 2-3 weeks, with albumin 
Spoke to pt who understood results    Labs ordered
164

## 2019-07-26 ENCOUNTER — APPOINTMENT (OUTPATIENT)
Dept: RADIOLOGY | Facility: MEDICAL CENTER | Age: 39
End: 2019-07-26
Payer: COMMERCIAL

## 2019-07-26 ENCOUNTER — OFFICE VISIT (OUTPATIENT)
Dept: FAMILY MEDICINE CLINIC | Facility: CLINIC | Age: 39
End: 2019-07-26
Payer: COMMERCIAL

## 2019-07-26 VITALS
OXYGEN SATURATION: 94 % | RESPIRATION RATE: 16 BRPM | HEIGHT: 72 IN | BODY MASS INDEX: 39.63 KG/M2 | SYSTOLIC BLOOD PRESSURE: 142 MMHG | HEART RATE: 101 BPM | TEMPERATURE: 98.6 F | DIASTOLIC BLOOD PRESSURE: 101 MMHG | WEIGHT: 292.6 LBS

## 2019-07-26 DIAGNOSIS — M79.641 RIGHT HAND PAIN: ICD-10-CM

## 2019-07-26 DIAGNOSIS — F31.70 BIPOLAR DISORDER IN FULL REMISSION, MOST RECENT EPISODE UNSPECIFIED TYPE (HCC): ICD-10-CM

## 2019-07-26 DIAGNOSIS — E89.0 HYPOTHYROIDISM, POSTSURGICAL: ICD-10-CM

## 2019-07-26 DIAGNOSIS — R73.01 IMPAIRED FASTING GLUCOSE: ICD-10-CM

## 2019-07-26 DIAGNOSIS — R79.89 LOW TESTOSTERONE: ICD-10-CM

## 2019-07-26 DIAGNOSIS — G47.33 OSA (OBSTRUCTIVE SLEEP APNEA): ICD-10-CM

## 2019-07-26 DIAGNOSIS — E03.9 HYPOTHYROIDISM, UNSPECIFIED TYPE: ICD-10-CM

## 2019-07-26 DIAGNOSIS — E66.9 OBESITY (BMI 30-39.9): ICD-10-CM

## 2019-07-26 DIAGNOSIS — F33.41 RECURRENT MAJOR DEPRESSIVE DISORDER, IN PARTIAL REMISSION (HCC): ICD-10-CM

## 2019-07-26 DIAGNOSIS — I10 ESSENTIAL HYPERTENSION: ICD-10-CM

## 2019-07-26 DIAGNOSIS — C73 PAPILLARY CARCINOMA OF THYROID (HCC): Primary | ICD-10-CM

## 2019-07-26 DIAGNOSIS — B36.0 TINEA VERSICOLOR: ICD-10-CM

## 2019-07-26 PROBLEM — F19.10 SUBSTANCE ABUSE (HCC): Status: RESOLVED | Noted: 2017-11-01 | Resolved: 2019-07-26

## 2019-07-26 PROBLEM — F18.10 INHALANT ABUSE (HCC): Status: RESOLVED | Noted: 2017-03-27 | Resolved: 2019-07-26

## 2019-07-26 LAB — SL AMB POCT HEMOGLOBIN AIC: 6.4 (ref ?–6.5)

## 2019-07-26 PROCEDURE — 83036 HEMOGLOBIN GLYCOSYLATED A1C: CPT | Performed by: FAMILY MEDICINE

## 2019-07-26 PROCEDURE — 73130 X-RAY EXAM OF HAND: CPT

## 2019-07-26 PROCEDURE — 99214 OFFICE O/P EST MOD 30 MIN: CPT | Performed by: FAMILY MEDICINE

## 2019-07-26 RX ORDER — KETOCONAZOLE 20 MG/ML
1 SHAMPOO TOPICAL 2 TIMES WEEKLY
Qty: 120 ML | Refills: 0 | Status: SHIPPED | OUTPATIENT
Start: 2019-07-29

## 2019-07-26 RX ORDER — LISINOPRIL AND HYDROCHLOROTHIAZIDE 12.5; 1 MG/1; MG/1
1 TABLET ORAL DAILY
Qty: 90 TABLET | Refills: 0 | Status: SHIPPED | OUTPATIENT
Start: 2019-07-26 | End: 2019-10-24 | Stop reason: SDUPTHER

## 2019-07-26 NOTE — PROGRESS NOTES
Assessment/Plan:    No problem-specific Assessment & Plan notes found for this encounter  Diagnoses and all orders for this visit:    Papillary carcinoma of thyroid ([facility] )  Comments:  doing well  continue current dose     Hypothyroidism, postsurgical  -     CBC and differential  -     Comprehensive metabolic panel  -     TSH, 3rd generation with Free T4 reflex; Future    REBEKAH (obstructive sleep apnea)  -     Ambulatory referral to Sleep Medicine    Recurrent major depressive disorder, in partial remission ([facility] )  Comments:  doing well on prozac    Right hand pain  -     XR hand 3+ vw right; Future  -     Ambulatory referral to Orthopedic Surgery    Impaired fasting glucose  -     Microalbumin / creatinine urine ratio  -     Lipid panel  -     POCT hemoglobin A1c    Essential hypertension  -     lisinopril-hydrochlorothiazide (PRINZIDE,ZESTORETIC) 10-12 5 MG per tablet; Take 1 tablet by mouth daily    Hypothyroidism, unspecified type    Low testosterone  -     Testosterone, free, total; Future    Bipolar disorder in full remission, most recent episode unspecified type (HCC)    Tinea versicolor  -     ketoconazole (NIZORAL) 2 % shampoo; Apply 1 application topically 2 (two) times a week      BMI Counseling: Body mass index is 39 68 kg/m²  Discussed the patient's BMI with him  The BMI is above average  BMI counseling and education was provided to the patient  Nutrition recommendations include reducing portion sizes, decreasing overall calorie intake, 3-5 servings of fruits/vegetables daily, reducing fast food intake and decreasing soda and/or juice intake  Exercise recommendations include moderate aerobic physical activity for 150 minutes/week  Subjective:      Patient ID: Esperanza Diaz is a 37 y o  male    Here for follow up  Still not losing weight  Not eating well   C/o right hand pain and "knots" in his left hand  He does kick boxing recently     Esperanza Diaz is a 37 y o  male who presents for follow up of hypothyroidism  Current symptoms: change in energy level  Patient denies heat / cold intolerance, nervousness and palpitations  Symptoms have been well-controlled  Hyperlipidemia   This is a chronic problem  The current episode started more than 1 year ago  Recent lipid tests were reviewed and are low  Exacerbating diseases include hypothyroidism and obesity  He has no history of chronic renal disease, diabetes or liver disease  Pertinent negatives include no chest pain, focal sensory loss, focal weakness, myalgias or shortness of breath  The current treatment provides mild improvement of lipids  There are no compliance problems  Risk factors for coronary artery disease include obesity  Diabetes   He presents for his follow-up diabetic visit  He has type 2 diabetes mellitus  His disease course has been stable  There are no hypoglycemic associated symptoms  Pertinent negatives for hypoglycemia include no sleepiness or speech difficulty  There are no diabetic associated symptoms  Pertinent negatives for diabetes include no chest pain, no fatigue and no polyuria  There are no hypoglycemic complications  Pertinent negatives for hypoglycemia complications include no nocturnal hypoglycemia and no required assistance  Symptoms are stable  There are no diabetic complications  Pertinent negatives for diabetic complications include no autonomic neuropathy, CVA or PVD  Risk factors for coronary artery disease include obesity  He is compliant with treatment most of the time  He has not had a previous visit with a dietitian  He participates in exercise intermittently  An ACE inhibitor/angiotensin II receptor blocker is being taken  He does not see a podiatrist Eye exam is not current  Rash   This is a chronic problem  The current episode started more than 1 month ago  The problem has been waxing and waning since onset  The rash is diffuse  The rash is characterized by itchiness  He was exposed to nothing   Pertinent negatives include no anorexia, congestion, facial edema, fatigue, fever, rhinorrhea or shortness of breath  Past treatments include nothing  There is no history of allergies, asthma, eczema or varicella  The following portions of the patient's history were reviewed and updated as appropriate: allergies, current medications, past family history, past medical history, past social history, past surgical history and problem list     Review of Systems   Constitutional: Negative  Negative for fatigue and fever  HENT: Negative for congestion, postnasal drip and rhinorrhea  Respiratory: Negative for shortness of breath  Cardiovascular: Negative for chest pain and leg swelling  Gastrointestinal: Negative for anorexia  Endocrine: Positive for cold intolerance  Negative for polyuria  Musculoskeletal: Positive for joint swelling  Negative for myalgias  Skin: Positive for rash  Allergic/Immunologic: Negative  Neurological: Negative for focal weakness and speech difficulty  Hematological: Negative for adenopathy  Does not bruise/bleed easily  Psychiatric/Behavioral: Negative for agitation, behavioral problems, dysphoric mood and hallucinations  The patient is not hyperactive  Objective:      BP (!) 142/101 (BP Location: Left arm, Patient Position: Sitting, Cuff Size: Large)   Pulse 101   Temp 98 6 °F (37 °C) (Tympanic)   Resp 16   Ht 6' (1 829 m)   Wt 133 kg (292 lb 9 6 oz)   SpO2 94%   BMI 39 68 kg/m²          Physical Exam   Constitutional: He appears well-developed and well-nourished  Eyes: Pupils are equal, round, and reactive to light  EOM are normal    Cardiovascular: Normal rate and regular rhythm  Pulmonary/Chest: Effort normal and breath sounds normal    Musculoskeletal: He exhibits tenderness  Right hand   Left hand nodule on alvarado aspect    Neurological: No cranial nerve deficit  Coordination normal    Skin: Skin is warm  Rash noted  No erythema     Discolored maculopapular rash over chest and back

## 2019-07-27 ENCOUNTER — TELEPHONE (OUTPATIENT)
Dept: OTHER | Facility: OTHER | Age: 39
End: 2019-07-27

## 2019-07-27 ENCOUNTER — DOCUMENTATION (OUTPATIENT)
Dept: FAMILY MEDICINE CLINIC | Facility: CLINIC | Age: 39
End: 2019-07-27

## 2019-07-27 ENCOUNTER — APPOINTMENT (OUTPATIENT)
Dept: LAB | Facility: MEDICAL CENTER | Age: 39
End: 2019-07-27
Payer: COMMERCIAL

## 2019-07-27 DIAGNOSIS — R79.89 LOW TESTOSTERONE: ICD-10-CM

## 2019-07-27 DIAGNOSIS — E89.0 HYPOTHYROIDISM, POSTSURGICAL: ICD-10-CM

## 2019-07-27 LAB
ALBUMIN SERPL BCP-MCNC: 3.8 G/DL (ref 3.5–5)
ALP SERPL-CCNC: 95 U/L (ref 46–116)
ALT SERPL W P-5'-P-CCNC: 41 U/L (ref 12–78)
ANION GAP SERPL CALCULATED.3IONS-SCNC: 8 MMOL/L (ref 4–13)
AST SERPL W P-5'-P-CCNC: 20 U/L (ref 5–45)
BASOPHILS # BLD AUTO: 0.03 THOUSANDS/ΜL (ref 0–0.1)
BASOPHILS NFR BLD AUTO: 0 % (ref 0–1)
BILIRUB SERPL-MCNC: 0.41 MG/DL (ref 0.2–1)
BUN SERPL-MCNC: 11 MG/DL (ref 5–25)
CALCIUM SERPL-MCNC: 8.5 MG/DL (ref 8.3–10.1)
CHLORIDE SERPL-SCNC: 104 MMOL/L (ref 100–108)
CHOLEST SERPL-MCNC: 241 MG/DL (ref 50–200)
CO2 SERPL-SCNC: 27 MMOL/L (ref 21–32)
CREAT SERPL-MCNC: 0.71 MG/DL (ref 0.6–1.3)
CREAT UR-MCNC: 146 MG/DL
EOSINOPHIL # BLD AUTO: 0.19 THOUSAND/ΜL (ref 0–0.61)
EOSINOPHIL NFR BLD AUTO: 2 % (ref 0–6)
ERYTHROCYTE [DISTWIDTH] IN BLOOD BY AUTOMATED COUNT: 13.7 % (ref 11.6–15.1)
GFR SERPL CREATININE-BSD FRML MDRD: 117 ML/MIN/1.73SQ M
GLUCOSE P FAST SERPL-MCNC: 115 MG/DL (ref 65–99)
HCT VFR BLD AUTO: 43.6 % (ref 36.5–49.3)
HDLC SERPL-MCNC: 43 MG/DL (ref 40–60)
HGB BLD-MCNC: 13.9 G/DL (ref 12–17)
IMM GRANULOCYTES # BLD AUTO: 0.09 THOUSAND/UL (ref 0–0.2)
IMM GRANULOCYTES NFR BLD AUTO: 1 % (ref 0–2)
LDLC SERPL CALC-MCNC: 134 MG/DL (ref 0–100)
LYMPHOCYTES # BLD AUTO: 1.99 THOUSANDS/ΜL (ref 0.6–4.47)
LYMPHOCYTES NFR BLD AUTO: 23 % (ref 14–44)
MCH RBC QN AUTO: 28.3 PG (ref 26.8–34.3)
MCHC RBC AUTO-ENTMCNC: 31.9 G/DL (ref 31.4–37.4)
MCV RBC AUTO: 89 FL (ref 82–98)
MICROALBUMIN UR-MCNC: 11.1 MG/L (ref 0–20)
MICROALBUMIN/CREAT 24H UR: 8 MG/G CREATININE (ref 0–30)
MONOCYTES # BLD AUTO: 0.33 THOUSAND/ΜL (ref 0.17–1.22)
MONOCYTES NFR BLD AUTO: 4 % (ref 4–12)
NEUTROPHILS # BLD AUTO: 5.86 THOUSANDS/ΜL (ref 1.85–7.62)
NEUTS SEG NFR BLD AUTO: 70 % (ref 43–75)
NONHDLC SERPL-MCNC: 198 MG/DL
NRBC BLD AUTO-RTO: 0 /100 WBCS
PLATELET # BLD AUTO: 279 THOUSANDS/UL (ref 149–390)
PMV BLD AUTO: 9.8 FL (ref 8.9–12.7)
POTASSIUM SERPL-SCNC: 4.1 MMOL/L (ref 3.5–5.3)
PROT SERPL-MCNC: 7.1 G/DL (ref 6.4–8.2)
RBC # BLD AUTO: 4.92 MILLION/UL (ref 3.88–5.62)
SODIUM SERPL-SCNC: 139 MMOL/L (ref 136–145)
T4 FREE SERPL-MCNC: 0.58 NG/DL (ref 0.76–1.46)
TRIGL SERPL-MCNC: 322 MG/DL
TSH SERPL DL<=0.05 MIU/L-ACNC: 22.9 UIU/ML (ref 0.36–3.74)
WBC # BLD AUTO: 8.49 THOUSAND/UL (ref 4.31–10.16)

## 2019-07-27 PROCEDURE — 80061 LIPID PANEL: CPT | Performed by: FAMILY MEDICINE

## 2019-07-27 PROCEDURE — 82043 UR ALBUMIN QUANTITATIVE: CPT | Performed by: FAMILY MEDICINE

## 2019-07-27 PROCEDURE — 82570 ASSAY OF URINE CREATININE: CPT | Performed by: FAMILY MEDICINE

## 2019-07-27 PROCEDURE — 84402 ASSAY OF FREE TESTOSTERONE: CPT

## 2019-07-27 PROCEDURE — 3061F NEG MICROALBUMINURIA REV: CPT | Performed by: FAMILY MEDICINE

## 2019-07-27 PROCEDURE — 84439 ASSAY OF FREE THYROXINE: CPT

## 2019-07-27 PROCEDURE — 80053 COMPREHEN METABOLIC PANEL: CPT | Performed by: FAMILY MEDICINE

## 2019-07-27 PROCEDURE — 84403 ASSAY OF TOTAL TESTOSTERONE: CPT

## 2019-07-27 PROCEDURE — 36415 COLL VENOUS BLD VENIPUNCTURE: CPT | Performed by: FAMILY MEDICINE

## 2019-07-27 PROCEDURE — 85025 COMPLETE CBC W/AUTO DIFF WBC: CPT | Performed by: FAMILY MEDICINE

## 2019-07-27 PROCEDURE — 84443 ASSAY THYROID STIM HORMONE: CPT

## 2019-07-27 NOTE — TELEPHONE ENCOUNTER
Antonia Roldan 1980  CONFIDENTIALTY NOTICE: This fax transmission is intended only for the addressee  It contains information that is legally privileged,  confidential or otherwise protected from use or disclosure  If you are not the intended recipient, you are strictly prohibited from reviewing,  disclosing, copying using or disseminating any of this information or taking any action in reliance on or regarding this information  If you have  received this fax in error, please notify us immediately by telephone so that we can arrange for its return to us  Page:   Call Id: [id number]  Health Call  Standard Call Report  Health Call  Patient Name: Antonia Roldan  Gender: Male  : 1980  Age: 37 Y 3 M  Return Phone  Number: [phone] (Home)  Address: [address] [place] [address]/[facility]/Zip: [place] [address]  Practice Name: [address] [place] [address],[address]  Practice Charged:  Physician:  China Felix Name:  Relationship To  Patient: Self  Return Phone Number: [phone] (Home)  Presenting Problem: "I would like the results of my XRay "  Service Type: Messages  Charged Service 1: Messages  Pharmacy Name and  Number:  Nurse Assessment  Protocols  Protocol Title Nurse Date/Time  Disp  Time Disposition Final User  2019 2:23:11 PM Send to Follow Up Cristela Joseph RN, Bria Munson  2019 2:44:21 PM Close Yes Bria Morales  Comments  User: Phuong Mcintosh RN Date/Time: 2019 2:22:38 PM  Patient triaged earlier for right hand swelling and pain  Was referred to [facility] within 4 hours  Patient is calling back asking  for Hand Xray results  Patient was seen in the office yesterday  Patient concerned because of the pain and swelling  Paged on  call provider and awaiting on call back  User: Phuong Mcintosh RN Date/Time: 2019 2:43:56 PM  Did notice that patients hand Xray results were abnormal  This is not something that we release or discuss after hours and did  make patient aware of this   Because he was seen in the office yesterday and continues to have this pain paged on call provider to  see if she is able to discuss results with patient  Dr Alondra Medina stated that patient would need to follow up with Ortho (which will  be difficult to do over the weekend)  Did take patients information and will be calling patient back to discuss his results

## 2019-07-27 NOTE — PROGRESS NOTES
Returned call to [facility] Answering Service and was connected to Jacqulynn Apley phone at [phone]  Explained to patient that typically results are not given over the weekend but exception was made for patient who declined evaluation at [facility] today  See [facility] [place] note for other advice given today  Discussed x-ray results and presence of fracture  Patient advised to call [address] orthopedist with whom he has a relationship on Monday to schedule an appointment with hand surgeon for further evaluation  Patient advised to avoid MMA and other strenuous activities in the interim

## 2019-07-27 NOTE — TELEPHONE ENCOUNTER
Shirley Hinojosa 1980  CONFIDENTIALTY NOTICE: This fax transmission is intended only for the addressee  It contains information that is legally privileged,  confidential or otherwise protected from use or disclosure  If you are not the intended recipient, you are strictly prohibited from reviewing,  disclosing, copying using or disseminating any of this information or taking any action in reliance on or regarding this information  If you have  received this fax in error, please notify us immediately by telephone so that we can arrange for its return to us  Page:   Call Id: [id number]  Health Call  Standard Call Report  Health Call  Patient Name: Shirley Hinojosa  Gender: Male  : 1980  Age: 37 Y 3 M  Return Phone  Number: [phone] (Home)  Address: [address] [place] [address]/[facility]/Zip: [place] [address]  Practice Name: [address] [place] [address],[address]  Practice Charged:  Physician:  [address] [place] [address] Name:  Relationship To  Patient: Self  Return Phone Number: [phone] (Home)  Presenting Problem: " I hurt my hand and I'm not sure if I  should get seen "  Service Type: Triage  Charged Service 1: N/A  Pharmacy Name and  Number:  Nurse Assessment  Nurse: Kaci Dowell Date/Time: 2019 10:17:15 AM  Type of assessment required:  ---General (Adult or Child)  Duration of Current S/S  ---Occurred 2- 3 nights ago  Location/Radiation  ---Right hand  Temperature (F) and route:  ---Denies fever  Symptom Specific Meds (Dose/Time):  ---None  Other S/S  ---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 MMA  Patient "punched something" and Right hand is  "severely swollen, bruised  can barely bend fingers, and believes that middle and ring  fingers are broken  Patient has constant, throbbing, burning pain "  Pain Scale on scale of 1-10, 10 being the worst:  ---9 out of 10  Symptom progression:  ---worse  Intake and Output  Shirley Hinojosa 1980  CONFIDENTIALTY NOTICE: This fax transmission is intended only for the addressee   It contains information that is legally privileged,  confidential or otherwise protected from use or disclosure  If you are not the intended recipient, you are strictly prohibited from reviewing,  disclosing, copying using or disseminating any of this information or taking any action in reliance on or regarding this information  If you have  received this fax in error, please notify us immediately by telephone so that we can arrange for its return to us  Page: 2 of 2  Call Id: [id number]  Nurse Assessment  ---WNL  Last Exam/Treatment:  ---07/26/19 for these symptoms  Protocols  Protocol Title Nurse Date/Time  Hand and Wrist Injury Joann Dunlap 7/27/2019 10:26:08 AM  Question Caller Affirmed  Disp  Time Disposition Final User  7/27/2019 10:27:25 AM See Physician within 4 Hours (or PCP  triage)  Marilyn King RN, Kell Bridges  7/27/2019 10:30:10 AM RN Triaged Yes Marilyn King RN, [facility] Advice Given Per Protocol  SEE PHYSICIAN WITHIN 4 HOURS (or PCP triage): * IF OFFICE WILL BE CLOSED AND NO PCP TRIAGE: You need to be  seen within the next 3 or 4 hours  A nearby Urgent Care Center is often a good source of care  Another choice is to go to the ER  Go  sooner if you become worse  LOCAL COLD: Apply cold pack or an ice bag (wrapped in a moist towel) for 20 minutes out of every  hour until seen  PAIN MEDICINES: * For pain relief, take acetaminophen, ibuprofen, or naproxen  * Use the lowest amount that makes  your pain feel better  IBUPROFEN (E G , MOTRIN, ADVIL): * Take 400 mg (two 200 mg pills) by mouth every 6 hours as needed  *  Another choice is to take 600 mg (three 200 mg pills) by mouth every 8 hours as needed  * The most you should take each day is 1,200  mg (six 200 mg pills a day), unless your doctor has told you to take more   CAUTION - NSAIDS (E G , IBUPROFEN, NAPROXEN):  * Do not take nonsteroidal anti-inflammatory drugs (NSAIDs) if you have stomach problems, kidney disease, heart failure, or other  contraindications to using this type of medication  * Do not take NSAID medications for over 7 days without consulting your PCP  *  Do not take NSAID medications if you are pregnant  * You may take this medicine with or without food  Taking it with food or milk  may lessen the chance the drug will upset your stomach  * GASTROINTESTINAL RISK: There is an increased risk of stomach ulcers,  GI bleeding, perforation  * CARDIOVASCULAR RISK: There may be an increased risk of heart attack and stroke  REMOVE RINGS:  If possible, remove any rings or jewelry from the fingers of the injured hand  (Reason: swelling from injury may cause ring to become  trapped on finger, the ring then cuts off circulation to the finger) CALL BACK IF: * You become worse  CARE ADVICE given per Hand  and Wrist Injury (Adult) guideline    Caller Understands: Yes  Caller Disagree/Comply: Comply  PreDisposition: Unsure

## 2019-07-29 ENCOUNTER — OFFICE VISIT (OUTPATIENT)
Dept: OCCUPATIONAL THERAPY | Facility: MEDICAL CENTER | Age: 39
End: 2019-07-29
Payer: COMMERCIAL

## 2019-07-29 ENCOUNTER — TELEPHONE (OUTPATIENT)
Dept: FAMILY MEDICINE CLINIC | Facility: CLINIC | Age: 39
End: 2019-07-29

## 2019-07-29 ENCOUNTER — OFFICE VISIT (OUTPATIENT)
Dept: OBGYN CLINIC | Facility: CLINIC | Age: 39
End: 2019-07-29
Payer: COMMERCIAL

## 2019-07-29 VITALS
BODY MASS INDEX: 39.28 KG/M2 | HEART RATE: 126 BPM | HEIGHT: 72 IN | DIASTOLIC BLOOD PRESSURE: 98 MMHG | SYSTOLIC BLOOD PRESSURE: 140 MMHG | WEIGHT: 290 LBS

## 2019-07-29 DIAGNOSIS — S62.634A CLOSED MALLET FRACTURE OF DISTAL PHALANX OF RIGHT RING FINGER: ICD-10-CM

## 2019-07-29 DIAGNOSIS — S62.634A CLOSED MALLET FRACTURE OF DISTAL PHALANX OF RIGHT RING FINGER: Primary | ICD-10-CM

## 2019-07-29 DIAGNOSIS — M72.0 DUPUYTREN'S DISEASE: ICD-10-CM

## 2019-07-29 DIAGNOSIS — C73 PAPILLARY CARCINOMA OF THYROID (HCC): Primary | ICD-10-CM

## 2019-07-29 LAB
TESTOST FREE SERPL-MCNC: 4.5 PG/ML (ref 6.8–21.5)
TESTOST SERPL-MCNC: 112 NG/DL (ref 264–916)

## 2019-07-29 PROCEDURE — 26750 TREAT FINGER FRACTURE EACH: CPT | Performed by: ORTHOPAEDIC SURGERY

## 2019-07-29 PROCEDURE — 99203 OFFICE O/P NEW LOW 30 MIN: CPT | Performed by: ORTHOPAEDIC SURGERY

## 2019-07-29 PROCEDURE — L3933 FO W/O JOINTS CF: HCPCS

## 2019-07-29 RX ORDER — LEVOTHYROXINE SODIUM 0.2 MG/1
200 TABLET ORAL
Qty: 30 TABLET | Refills: 5 | Status: SHIPPED | OUTPATIENT
Start: 2019-07-29 | End: 2020-04-07 | Stop reason: HOSPADM

## 2019-07-29 NOTE — TELEPHONE ENCOUNTER
Ac Manuel 1980  CONFIDENTIALTY NOTICE: This fax transmission is intended only for the addressee  It contains information that is legally privileged,  confidential or otherwise protected from use or disclosure  If you are not the intended recipient, you are strictly prohibited from reviewing,  disclosing, copying using or disseminating any of this information or taking any action in reliance on or regarding this information  If you have  received this fax in error, please notify us immediately by telephone so that we can arrange for its return to us  Page:   Call Id: [id number]  Health Call  Standard Call Report  Health Call  Patient Name: Ac Manuel  Gender: Male  : 1980  Age: 37 Y 3 M  Return Phone  Number: [phone] (Home)  Address: [address] [place] [address]  City/[facility]/Zip: [place] [address]  Practice Name: [address] [facility]  Practice Charged:  Physician:  [facility] Name:  Relationship To  Patient: Self  Return Phone Number: [phone] (Home)  Presenting Problem: " I hurt my hand and I'm not sure if I  should get seen "  Service Type: Triage  Charged Service 1: N/A  Pharmacy Name and  Number:  Nurse Assessment  Nurse: Zunilda Zazueta Date/Time: 2019 10:17:15 AM  Type of assessment required:  ---General (Adult or Child)  Duration of Current S/S  ---Occurred 2- 3 nights ago  Location/Radiation  ---Right hand  Temperature (F) and route:  ---Denies fever  Symptom Specific Meds (Dose/Time):  ---None  Other S/S  ---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 MMA  Patient "punched something" and Right hand is  "severely swollen, bruised  can barely bend fingers, and believes that middle and ring  fingers are broken  Patient has constant, throbbing, burning pain "  Pain Scale on scale of 1-10, 10 being the worst:  ---9 out of 10  Symptom progression:  ---worse  Intake and Output  Ac Manuel 1980  CONFIDENTIALTY NOTICE: This fax transmission is intended only for the addressee   It contains information that is legally privileged,  confidential or otherwise protected from use or disclosure  If you are not the intended recipient, you are strictly prohibited from reviewing,  disclosing, copying using or disseminating any of this information or taking any action in reliance on or regarding this information  If you have  received this fax in error, please notify us immediately by telephone so that we can arrange for its return to us  Page: 2 of 2  Call Id: [id number]  Nurse Assessment  ---WNL  Last Exam/Treatment:  ---07/26/19 for these symptoms  Protocols  Protocol Title Nurse Date/Time  Hand and Wrist Injury Zunilda Zazueta 7/27/2019 10:26:08 AM  Question Caller Affirmed  Disp  Time Disposition Final User  7/27/2019 10:27:25 AM See Physician within 4 Hours (or PCP  triage)  Ji Mondragon RN, Brady Ramon  7/27/2019 10:30:10 AM RN Triaged Yes Ji Mondragon RN, [facility] Advice Given Per Protocol  SEE PHYSICIAN WITHIN 4 HOURS (or PCP triage): * IF OFFICE WILL BE CLOSED AND NO PCP TRIAGE: You need to be  seen within the next 3 or 4 hours  A nearby Urgent Care Center is often a good source of care  Another choice is to go to the ER  Go  sooner if you become worse  LOCAL COLD: Apply cold pack or an ice bag (wrapped in a moist towel) for 20 minutes out of every  hour until seen  PAIN MEDICINES: * For pain relief, take acetaminophen, ibuprofen, or naproxen  * Use the lowest amount that makes  your pain feel better  IBUPROFEN (E G , MOTRIN, ADVIL): * Take 400 mg (two 200 mg pills) by mouth every 6 hours as needed  *  Another choice is to take 600 mg (three 200 mg pills) by mouth every 8 hours as needed  * The most you should take each day is 1,200  mg (six 200 mg pills a day), unless your doctor has told you to take more   CAUTION - NSAIDS (E G , IBUPROFEN, NAPROXEN):  * Do not take nonsteroidal anti-inflammatory drugs (NSAIDs) if you have stomach problems, kidney disease, heart failure, or other  contraindications to using this type of medication  * Do not take NSAID medications for over 7 days without consulting your PCP  *  Do not take NSAID medications if you are pregnant  * You may take this medicine with or without food  Taking it with food or milk  may lessen the chance the drug will upset your stomach  * GASTROINTESTINAL RISK: There is an increased risk of stomach ulcers,  GI bleeding, perforation  * CARDIOVASCULAR RISK: There may be an increased risk of heart attack and stroke  REMOVE RINGS:  If possible, remove any rings or jewelry from the fingers of the injured hand  (Reason: swelling from injury may cause ring to become  trapped on finger, the ring then cuts off circulation to the finger) CALL BACK IF: * You become worse  CARE ADVICE given per Hand  and Wrist Injury (Adult) guideline    Caller Understands: Yes  Caller Disagree/Comply: Comply  PreDisposition: Unsure         Documentation
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
## 2019-07-29 NOTE — TELEPHONE ENCOUNTER
[facility] IS SIG FINDINGS ON PT RIGHT HAND XRAY

## 2019-07-29 NOTE — TELEPHONE ENCOUNTER
Patient called and gave him the results of his right hand xray but patient would like to talk with you, when you have the chance to call him   Thank you

## 2019-07-29 NOTE — PROGRESS NOTES
Daily Note     Today's date: 2019  Patient name: Frankey Dumas  : 1980  MRN: [id number]  Referring provider: Kwan Tobin DO  Dx:   Encounter Diagnosis     ICD-10-CM    1  Closed mallet fracture of distal phalanx of right ring finger S62 634A Ambulatory referral to PT/OT hand therapy            Orthotic fabrication     Indication: Motion Blocking    Location: Right  ring finger  Supplies: Custom Fit Orthotic x2  Splint type: Mallet/DIP Blocking  Wearing Schedule: At all times  Describe Position: DIP slight hyperextension, PIP free  Precautions: Universal (skin contact/breakdown)    Patient or Caregiver expresses understanding of wearing Schedule and Precautions? Yes  Patient or Caregiver able to don/doff orthotic independently? Yes    Written orders provided to patient?  No  Orders Obtained: Written  Orders Obtained from: [address]    Return for evaluation and treatment No

## 2019-07-29 NOTE — TELEPHONE ENCOUNTER
There is a result note in chart  His 4th finger has fracture- he can go to ER if the pain is worse or to see hand surgeon as discussed during the visit

## 2019-07-29 NOTE — LETTER
July 29, 2019     Patient: Frankey Dumas   YOB: 1980   Date of Visit: 7/29/2019       To Whom it May Concern:    Frankey Dumas is under my professional care  He was seen in my office on 7/29/2019  He may return to work on light duty with no lifting greater than 5 lbs with his RUE  Patient may drive  If you have any questions or concerns, please don't hesitate to call           Sincerely,          Yohana Villegas DO        CC: No Recipients

## 2019-07-29 NOTE — LETTER
July 29, 2019     Patient: Amita Woodward   YOB: 1980   Date of Visit: 7/29/2019       To Whom it May Concern:    Amita Woodward is under my professional care  He was seen in my office on 7/29/2019  He may return to work light duty with no lifting greater than 1lb with the RUE    If you have any questions or concerns, please don't hesitate to call           Sincerely,          Miley Long DO        CC: No Recipients

## 2019-08-03 DIAGNOSIS — E03.9 HYPOTHYROIDISM, UNSPECIFIED TYPE: ICD-10-CM

## 2019-08-03 RX ORDER — LEVOTHYROXINE SODIUM 175 UG/1
TABLET ORAL
Qty: 30 TABLET | Refills: 2 | OUTPATIENT
Start: 2019-08-03

## 2019-08-08 DIAGNOSIS — F33.41 RECURRENT MAJOR DEPRESSIVE DISORDER, IN PARTIAL REMISSION (HCC): ICD-10-CM

## 2019-08-08 RX ORDER — FLUOXETINE HYDROCHLORIDE 40 MG/1
80 CAPSULE ORAL DAILY
Qty: 60 CAPSULE | Refills: 0 | Status: SHIPPED | OUTPATIENT
Start: 2019-08-08 | End: 2019-09-17 | Stop reason: SDUPTHER

## 2019-08-23 ENCOUNTER — OFFICE VISIT (OUTPATIENT)
Dept: OCCUPATIONAL THERAPY | Age: 39
End: 2019-08-23
Payer: COMMERCIAL

## 2019-08-23 DIAGNOSIS — S62.634A CLOSED MALLET FRACTURE OF DISTAL PHALANX OF RIGHT RING FINGER: Primary | ICD-10-CM

## 2019-08-23 PROCEDURE — L3933 FO W/O JOINTS CF: HCPCS

## 2019-08-23 NOTE — PROGRESS NOTES
Orthosis    Diagnosis: No diagnosis found  Indication: Motion Blocking    Location: Right  ring finger  Supplies: Custom Fit Orthotic  Orthosis type: mallet splint  Wearing Schedule: wear at all times except for hygiene checks  Describe Position: DIP is slight hyperextension    Precautions: Universal (skin contact/breakdown)    Patient or Caregiver expresses understanding of wearing Schedule and Precautions? Yes  Patient or Caregiver able to don/doff orthotic independently? Yes    Written orders provided to patient? No  Orders Obtained: Written  Orders Obtained from: Dr Praneeth Kendall    Return for evaluation and treatment No    Note:  Patient lost both splints that were fabricated on 7/29/2019  I told him that he must start the 6wk of immobilization again from today's date

## 2019-08-30 ENCOUNTER — APPOINTMENT (OUTPATIENT)
Dept: RADIOLOGY | Facility: CLINIC | Age: 39
End: 2019-08-30
Payer: COMMERCIAL

## 2019-08-30 ENCOUNTER — OFFICE VISIT (OUTPATIENT)
Dept: OBGYN CLINIC | Facility: CLINIC | Age: 39
End: 2019-08-30

## 2019-08-30 VITALS
SYSTOLIC BLOOD PRESSURE: 122 MMHG | BODY MASS INDEX: 39.28 KG/M2 | HEART RATE: 82 BPM | HEIGHT: 72 IN | WEIGHT: 290 LBS | DIASTOLIC BLOOD PRESSURE: 85 MMHG

## 2019-08-30 DIAGNOSIS — S62.634A CLOSED MALLET FRACTURE OF DISTAL PHALANX OF RIGHT RING FINGER: ICD-10-CM

## 2019-08-30 DIAGNOSIS — S62.634A CLOSED MALLET FRACTURE OF DISTAL PHALANX OF RIGHT RING FINGER: Primary | ICD-10-CM

## 2019-08-30 DIAGNOSIS — M77.12 LATERAL EPICONDYLITIS OF LEFT ELBOW: ICD-10-CM

## 2019-08-30 DIAGNOSIS — M72.0 DUPUYTREN'S DISEASE: ICD-10-CM

## 2019-08-30 PROCEDURE — 73140 X-RAY EXAM OF FINGER(S): CPT

## 2019-08-30 PROCEDURE — 99024 POSTOP FOLLOW-UP VISIT: CPT | Performed by: ORTHOPAEDIC SURGERY
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
## 2019-09-17 DIAGNOSIS — F33.41 RECURRENT MAJOR DEPRESSIVE DISORDER, IN PARTIAL REMISSION (HCC): ICD-10-CM

## 2019-09-17 RX ORDER — FLUOXETINE HYDROCHLORIDE 40 MG/1
80 CAPSULE ORAL DAILY
Qty: 60 CAPSULE | Refills: 0 | Status: SHIPPED | OUTPATIENT
Start: 2019-09-17 | End: 2019-10-29 | Stop reason: SDUPTHER

## 2019-09-18 ENCOUNTER — TELEPHONE (OUTPATIENT)
Dept: FAMILY MEDICINE CLINIC | Facility: CLINIC | Age: 39
End: 2019-09-18

## 2019-09-18 NOTE — TELEPHONE ENCOUNTER
PT CALLED AND HAD TO CANCEL HIS 11/01 APPT FOR A PHYSICAL  PT ASKED IF YOU COULD GET HIM IN ANOTHER DAY AFTER 11/1 FOR 3:30   PLS ADVISE

## 2019-10-05 ENCOUNTER — TELEPHONE (OUTPATIENT)
Dept: ENDOCRINOLOGY | Facility: CLINIC | Age: 39
End: 2019-10-05

## 2019-10-05 DIAGNOSIS — E78.5 HYPERLIPIDEMIA, UNSPECIFIED HYPERLIPIDEMIA TYPE: ICD-10-CM

## 2019-10-05 DIAGNOSIS — E11.9 TYPE 2 DIABETES MELLITUS WITHOUT COMPLICATION, WITHOUT LONG-TERM CURRENT USE OF INSULIN (HCC): ICD-10-CM

## 2019-10-05 RX ORDER — ATORVASTATIN CALCIUM 10 MG/1
10 TABLET, FILM COATED ORAL DAILY
Qty: 30 TABLET | Refills: 4 | Status: SHIPPED | OUTPATIENT
Start: 2019-10-05 | End: 2020-04-09 | Stop reason: SDUPTHER

## 2019-10-05 RX ORDER — METFORMIN HYDROCHLORIDE 500 MG/1
TABLET, EXTENDED RELEASE ORAL
Qty: 60 TABLET | Refills: 1 | Status: ON HOLD | OUTPATIENT
Start: 2019-10-05 | End: 2020-04-06 | Stop reason: SDUPTHER

## 2019-10-05 NOTE — TELEPHONE ENCOUNTER
Got a refill request from pharmacy, I have sent RX   Please make follow up appt  for diabetes, thyroid cancer and hypothyroidism, hypogonadism       Last appt was in sept 2018   Diana Myles MD

## 2019-10-10 ENCOUNTER — OFFICE VISIT (OUTPATIENT)
Dept: OBGYN CLINIC | Facility: CLINIC | Age: 39
End: 2019-10-10
Payer: COMMERCIAL

## 2019-10-10 ENCOUNTER — APPOINTMENT (OUTPATIENT)
Dept: RADIOLOGY | Facility: CLINIC | Age: 39
End: 2019-10-10
Payer: COMMERCIAL

## 2019-10-10 VITALS
DIASTOLIC BLOOD PRESSURE: 85 MMHG | BODY MASS INDEX: 39.28 KG/M2 | HEART RATE: 116 BPM | SYSTOLIC BLOOD PRESSURE: 119 MMHG | WEIGHT: 290 LBS | HEIGHT: 72 IN

## 2019-10-10 DIAGNOSIS — M79.645 FINGER PAIN, LEFT: ICD-10-CM

## 2019-10-10 DIAGNOSIS — R22.32 FINGER MASS, LEFT: ICD-10-CM

## 2019-10-10 DIAGNOSIS — M79.642 LEFT HAND PAIN: ICD-10-CM

## 2019-10-10 DIAGNOSIS — M72.0 DUPUYTREN'S DISEASE: ICD-10-CM

## 2019-10-10 DIAGNOSIS — S62.634A CLOSED MALLET FRACTURE OF DISTAL PHALANX OF RIGHT RING FINGER: Primary | ICD-10-CM

## 2019-10-10 DIAGNOSIS — M77.12 LATERAL EPICONDYLITIS OF LEFT ELBOW: ICD-10-CM

## 2019-10-10 DIAGNOSIS — S62.634A CLOSED MALLET FRACTURE OF DISTAL PHALANX OF RIGHT RING FINGER: ICD-10-CM

## 2019-10-10 PROCEDURE — 73140 X-RAY EXAM OF FINGER(S): CPT

## 2019-10-10 PROCEDURE — 73130 X-RAY EXAM OF HAND: CPT

## 2019-10-10 PROCEDURE — 20551 NJX 1 TENDON ORIGIN/INSJ: CPT | Performed by: ORTHOPAEDIC SURGERY

## 2019-10-10 PROCEDURE — 99024 POSTOP FOLLOW-UP VISIT: CPT | Performed by: ORTHOPAEDIC SURGERY

## 2019-10-10 PROCEDURE — 20600 DRAIN/INJ JOINT/BURSA W/O US: CPT | Performed by: ORTHOPAEDIC SURGERY

## 2019-10-10 RX ORDER — TRIAMCINOLONE ACETONIDE 40 MG/ML
20 INJECTION, SUSPENSION INTRA-ARTICULAR; INTRAMUSCULAR
Status: COMPLETED | OUTPATIENT
Start: 2019-10-10 | End: 2019-10-10

## 2019-10-10 RX ORDER — LIDOCAINE HYDROCHLORIDE 5 MG/ML
0.5 INJECTION, SOLUTION INFILTRATION; PERINEURAL
Status: COMPLETED | OUTPATIENT
Start: 2019-10-10 | End: 2019-10-10

## 2019-10-10 RX ADMIN — TRIAMCINOLONE ACETONIDE 20 MG: 40 INJECTION, SUSPENSION INTRA-ARTICULAR; INTRAMUSCULAR at 16:36

## 2019-10-10 RX ADMIN — LIDOCAINE HYDROCHLORIDE 0.5 ML: 5 INJECTION, SOLUTION INFILTRATION; PERINEURAL at 16:38

## 2019-10-10 RX ADMIN — TRIAMCINOLONE ACETONIDE 20 MG: 40 INJECTION, SUSPENSION INTRA-ARTICULAR; INTRAMUSCULAR at 16:38

## 2019-10-10 RX ADMIN — LIDOCAINE HYDROCHLORIDE 0.5 ML: 5 INJECTION, SOLUTION INFILTRATION; PERINEURAL at 16:36
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
## 2019-10-24 DIAGNOSIS — I10 ESSENTIAL HYPERTENSION: ICD-10-CM

## 2019-10-24 RX ORDER — LISINOPRIL AND HYDROCHLOROTHIAZIDE 12.5; 1 MG/1; MG/1
TABLET ORAL
Qty: 90 TABLET | Refills: 0 | Status: SHIPPED | OUTPATIENT
Start: 2019-10-24 | End: 2020-04-09 | Stop reason: SDUPTHER

## 2019-10-29 DIAGNOSIS — F33.41 RECURRENT MAJOR DEPRESSIVE DISORDER, IN PARTIAL REMISSION (HCC): ICD-10-CM

## 2019-10-29 RX ORDER — FLUOXETINE HYDROCHLORIDE 40 MG/1
80 CAPSULE ORAL DAILY
Qty: 60 CAPSULE | Refills: 0 | Status: SHIPPED | OUTPATIENT
Start: 2019-10-29 | End: 2020-01-10

## 2019-10-31 ENCOUNTER — TELEPHONE (OUTPATIENT)
Dept: FAMILY MEDICINE CLINIC | Facility: CLINIC | Age: 39
End: 2019-10-31

## 2019-10-31 DIAGNOSIS — E89.0 HYPOTHYROIDISM, POSTSURGICAL: Primary | ICD-10-CM

## 2019-12-01 NOTE — ED NOTES
CW did intake and SA  Pt denies SI, HI, and psychosis  Pt admits to feeling depressed  Pt stated that been huffing keyboard  from [employer] today  Pt stated was just in rehab for several months and kicked his addiction to [facility]  Pt admits to not following up with Psychiatrist and therapist and is not taking medications as prescribed  Pt stated that [facility] and [facility]  Pt d/c w/ OP D&A and  resources  01-Dec-2019 13:57

## 2020-01-10 DIAGNOSIS — F33.41 RECURRENT MAJOR DEPRESSIVE DISORDER, IN PARTIAL REMISSION (HCC): ICD-10-CM

## 2020-01-10 RX ORDER — FLUOXETINE HYDROCHLORIDE 40 MG/1
80 CAPSULE ORAL DAILY
Qty: 60 CAPSULE | Refills: 0 | Status: SHIPPED | OUTPATIENT
Start: 2020-01-10 | End: 2020-01-24

## 2020-01-15 NOTE — TREATMENT PLAN
TREATMENT PLAN REVIEW - Jia [age] y o  1980 male MRN: [id number]    [address] [facility] Room / Bed: [place] [address] [id number] Encounter: [id number]          Admit Date/Time:  11/1/2017 12:59 AM    Treatment Team: Attending Provider: Candice Lara MD; : Travon Baldwin; Registered Nurse: Elton Serna, RN; Patient Care Technician: Nova King; Patient Care Technician: Sada Nation; Patient Care Technician: Cece Wharton; Registered Nurse: Marcie Vargas, RN; Registered Nurse: Maye Sheppard, RN; Registered Nurse: Cheyanne Colindres RN    Diagnosis: Principal Problem:    Severe episode of recurrent major depressive disorder, without psychotic features [facility])  Active Problems:    Substance abuse    Drug induced insomnia ([facility] )    Patient Strengths: average or above intelligence, capable of independent living, cooperative, communication skills, general fund of knowledge, motivated, negotiates basic needs, patient is on a voluntary commitment, stable/recent employment, work skills     Patient Barriers: limited education, limited family ties, limited insight, limited motivation, no/few hobbies or interests, noncompliant with medication, substance abuse, unresourceful    Short Term Goals: decrease in depressive symptoms, decrease in anxiety symptoms    Long Term Goals: improvement in depression, improvement in anxiety, stabilization of mood, free of suicidal thoughts, improved reality testing, improved reasoning ability    Progress Towards Goals: starting psychitric medications as prescribed    Recommended Treatment: medication management, patient medication education, group therapy, milieu therapy, continued Behavioral Health psychiatric evaluation/assessment process     Treatment Frequency: daily medication monitoring, group and milieu therapy daily, monitoring through interdisciplinary rounds, monitoring · Suspect secondary to progression of the underlying cancer verses infection not otherwise specified  · We have been instructed by the power of  for no further workup, testing and/or treatment  · Comfort measures have been implemented through weekly patient care conferences    Expected Discharge Date:  4-5 days    Discharge Plan: referral for outpatient drug and alcohol counseling, referral for outpatient medication management with a psychiatrist, referral for outpatient psychotherapy, return to previous living arrangement    Treatment Plan Created/Updated By: Blank Dominguez MD

## 2020-01-24 DIAGNOSIS — F33.41 RECURRENT MAJOR DEPRESSIVE DISORDER, IN PARTIAL REMISSION (HCC): ICD-10-CM

## 2020-01-24 RX ORDER — FLUOXETINE HYDROCHLORIDE 40 MG/1
CAPSULE ORAL
Qty: 180 CAPSULE | Refills: 0 | Status: ON HOLD | OUTPATIENT
Start: 2020-01-24 | End: 2020-04-06 | Stop reason: SDUPTHER

## 2020-02-12 NOTE — PROGRESS NOTES
Compliant with medications  Appears comfortable in the milieu  Socializing with peers and playing cards  Denies symptoms at this time  Will continue to monitor  intact

## 2020-03-30 ENCOUNTER — NURSE TRIAGE (OUTPATIENT)
Dept: OTHER | Facility: OTHER | Age: 40
End: 2020-03-30

## 2020-03-30 NOTE — TELEPHONE ENCOUNTER
Reason for Disposition   [1] Caller concerned that exposure to COVID-19 occurred BUT [2] does not meet COVID-19 EXPOSURE criteria from [facility]    Answer Assessment - Initial Assessment Questions  1  CONFIRMED CASE: "Who is the person with the confirmed COVID-19 infection that you were exposed to?"      No known exposure     Girlfriend works at [address] is not ill     He was recently in rehab in [place]    2  PLACE of CONTACT: "Where were you when you were exposed to COVID-19  (coronavirus disease 2019)?" (e g , city, state, country)      Unknown    3  TYPE of CONTACT: "How much contact was there?" (e g , live in same house, work in same office, same school)      Unknown    4  DATE of CONTACT: "When did you have contact with a coronavirus patient?" (e g , days)      Unknown    5  DURATION of CONTACT: "How long were you in contact with the COVID-19 (coronavirus disease) patient?" (e g , a few seconds, passed by person, a few minutes, live with the patient)      Unknown    6  SYMPTOMS: "Do you have any symptoms?" (e g , fever, cough, breathing difficulty)      Dry harsh cough for 2 months      Fever over the last 2 days  T max 100 5, now 97 8 using tylenol    8  HIGH RISK: "Do you have any heart or lung problems?  Do you have a weakened immune system?" (e g , CHF, COPD, asthma, HIV positive, chemotherapy, renal failure, diabetes mellitus, sickle cell anemia)      DM    Protocols used: CORONAVIRUS (COVID-19) EXPOSURE-ADULT-

## 2020-03-30 NOTE — TELEPHONE ENCOUNTER
Regarding: [place]  ----- Message from [email or web address] sent at 3/29/2020  9:03 PM EDT -----  Fever: Temp 100 1  Cough: Yes, dry x 2 mos  SOB: Yes    Recent travel outside the country: No  Exposed to anyone positive for coronavirus: Unknown

## 2020-04-05 ENCOUNTER — NURSE TRIAGE (OUTPATIENT)
Dept: OTHER | Facility: OTHER | Age: 40
End: 2020-04-05

## 2020-04-05 ENCOUNTER — APPOINTMENT (EMERGENCY)
Dept: RADIOLOGY | Facility: HOSPITAL | Age: 40
DRG: 638 | End: 2020-04-05
Payer: COMMERCIAL

## 2020-04-05 ENCOUNTER — HOSPITAL ENCOUNTER (INPATIENT)
Facility: HOSPITAL | Age: 40
LOS: 2 days | Discharge: HOME | DRG: 638 | End: 2020-04-07
Attending: EMERGENCY MEDICINE | Admitting: INTERNAL MEDICINE
Payer: COMMERCIAL

## 2020-04-05 DIAGNOSIS — E11.9 TYPE 2 DIABETES MELLITUS WITHOUT COMPLICATION, WITHOUT LONG-TERM CURRENT USE OF INSULIN (HCC): Primary | ICD-10-CM

## 2020-04-05 DIAGNOSIS — R73.9 HYPERGLYCEMIA: ICD-10-CM

## 2020-04-05 PROBLEM — R00.0 TACHYCARDIA: Status: ACTIVE | Noted: 2020-04-05

## 2020-04-05 PROBLEM — N17.9 AKI (ACUTE KIDNEY INJURY) (HCC): Status: ACTIVE | Noted: 2020-04-05

## 2020-04-05 PROBLEM — E87.1 HYPONATREMIA: Status: ACTIVE | Noted: 2020-04-05

## 2020-04-05 PROBLEM — F17.200 TOBACCO DEPENDENCE: Status: ACTIVE | Noted: 2020-04-05

## 2020-04-05 PROBLEM — E11.65 TYPE 2 DIABETES MELLITUS WITH HYPERGLYCEMIA, WITHOUT LONG-TERM CURRENT USE OF INSULIN (HCC): Status: ACTIVE | Noted: 2020-04-05

## 2020-04-05 LAB
ALBUMIN SERPL BCP-MCNC: 3.5 G/DL (ref 3.5–5)
ALP SERPL-CCNC: 210 U/L (ref 46–116)
ALT SERPL W P-5'-P-CCNC: 50 U/L (ref 12–78)
ANION GAP SERPL CALCULATED.3IONS-SCNC: 13 MMOL/L (ref 4–13)
AST SERPL W P-5'-P-CCNC: 104 U/L (ref 5–45)
BACTERIA UR QL AUTO: ABNORMAL /HPF
BASE EX.OXY STD BLDV CALC-SCNC: 92.2 % (ref 60–80)
BASE EXCESS BLDV CALC-SCNC: -1.3 MMOL/L
BASOPHILS # BLD AUTO: 0.02 THOUSANDS/ΜL (ref 0–0.1)
BASOPHILS NFR BLD AUTO: 0 % (ref 0–1)
BETA-HYDROXYBUTYRATE: 0.1 MMOL/L
BILIRUB SERPL-MCNC: 0.57 MG/DL (ref 0.2–1)
BILIRUB UR QL STRIP: NEGATIVE
BUN SERPL-MCNC: 22 MG/DL (ref 5–25)
CALCIUM SERPL-MCNC: 9.1 MG/DL (ref 8.3–10.1)
CHLORIDE SERPL-SCNC: 85 MMOL/L (ref 100–108)
CLARITY UR: CLEAR
CO2 SERPL-SCNC: 24 MMOL/L (ref 21–32)
COLOR UR: YELLOW
CREAT SERPL-MCNC: 1.37 MG/DL (ref 0.6–1.3)
EOSINOPHIL # BLD AUTO: 0.15 THOUSAND/ΜL (ref 0–0.61)
EOSINOPHIL NFR BLD AUTO: 2 % (ref 0–6)
ERYTHROCYTE [DISTWIDTH] IN BLOOD BY AUTOMATED COUNT: 13.3 % (ref 11.6–15.1)
GFR SERPL CREATININE-BSD FRML MDRD: 64 ML/MIN/1.73SQ M
GLUCOSE SERPL-MCNC: 1182 MG/DL (ref 65–140)
GLUCOSE SERPL-MCNC: >500 MG/DL (ref 65–140)
GLUCOSE SERPL-MCNC: >500 MG/DL (ref 65–140)
GLUCOSE UR STRIP-MCNC: ABNORMAL MG/DL
HCO3 BLDV-SCNC: 23.4 MMOL/L (ref 24–30)
HCT VFR BLD AUTO: 38.9 % (ref 36.5–49.3)
HGB BLD-MCNC: 14.5 G/DL (ref 12–17)
HGB UR QL STRIP.AUTO: ABNORMAL
IMM GRANULOCYTES # BLD AUTO: 0.11 THOUSAND/UL (ref 0–0.2)
IMM GRANULOCYTES NFR BLD AUTO: 1 % (ref 0–2)
KETONES UR STRIP-MCNC: NEGATIVE MG/DL
LEUKOCYTE ESTERASE UR QL STRIP: NEGATIVE
LYMPHOCYTES # BLD AUTO: 1.91 THOUSANDS/ΜL (ref 0.6–4.47)
LYMPHOCYTES NFR BLD AUTO: 22 % (ref 14–44)
MAGNESIUM SERPL-MCNC: 2.1 MG/DL (ref 1.6–2.6)
MCH RBC QN AUTO: 32.2 PG (ref 26.8–34.3)
MCHC RBC AUTO-ENTMCNC: 37.3 G/DL (ref 31.4–37.4)
MCV RBC AUTO: 86 FL (ref 82–98)
MONOCYTES # BLD AUTO: 0.29 THOUSAND/ΜL (ref 0.17–1.22)
MONOCYTES NFR BLD AUTO: 3 % (ref 4–12)
NEUTROPHILS # BLD AUTO: 6.28 THOUSANDS/ΜL (ref 1.85–7.62)
NEUTS SEG NFR BLD AUTO: 72 % (ref 43–75)
NITRITE UR QL STRIP: NEGATIVE
NON-SQ EPI CELLS URNS QL MICRO: ABNORMAL /HPF
NRBC BLD AUTO-RTO: 0 /100 WBCS
O2 CT BLDV-SCNC: 17 ML/DL
PCO2 BLDV: 39.2 MM HG (ref 42–50)
PH BLDV: 7.39 [PH] (ref 7.3–7.4)
PH UR STRIP.AUTO: 6 [PH] (ref 4.5–8)
PHOSPHATE SERPL-MCNC: 5 MG/DL (ref 2.7–4.5)
PLATELET # BLD AUTO: 362 THOUSANDS/UL (ref 149–390)
PMV BLD AUTO: 10.7 FL (ref 8.9–12.7)
PO2 BLDV: 78 MM HG (ref 35–45)
POTASSIUM SERPL-SCNC: 6.2 MMOL/L (ref 3.5–5.3)
PROT SERPL-MCNC: 8 G/DL (ref 6.4–8.2)
PROT UR STRIP-MCNC: NEGATIVE MG/DL
RBC # BLD AUTO: 4.51 MILLION/UL (ref 3.88–5.62)
RBC #/AREA URNS AUTO: ABNORMAL /HPF
SODIUM SERPL-SCNC: 122 MMOL/L (ref 136–145)
SP GR UR STRIP.AUTO: <=1.005 (ref 1–1.03)
UROBILINOGEN UR QL STRIP.AUTO: 0.2 E.U./DL
WBC # BLD AUTO: 8.76 THOUSAND/UL (ref 4.31–10.16)
WBC #/AREA URNS AUTO: ABNORMAL /HPF

## 2020-04-05 PROCEDURE — 96361 HYDRATE IV INFUSION ADD-ON: CPT

## 2020-04-05 PROCEDURE — 84100 ASSAY OF PHOSPHORUS: CPT | Performed by: EMERGENCY MEDICINE

## 2020-04-05 PROCEDURE — 85025 COMPLETE CBC W/AUTO DIFF WBC: CPT | Performed by: EMERGENCY MEDICINE

## 2020-04-05 PROCEDURE — 83036 HEMOGLOBIN GLYCOSYLATED A1C: CPT | Performed by: EMERGENCY MEDICINE

## 2020-04-05 PROCEDURE — 99285 EMERGENCY DEPT VISIT HI MDM: CPT

## 2020-04-05 PROCEDURE — 99285 EMERGENCY DEPT VISIT HI MDM: CPT | Performed by: EMERGENCY MEDICINE

## 2020-04-05 PROCEDURE — 82010 KETONE BODYS QUAN: CPT | Performed by: EMERGENCY MEDICINE

## 2020-04-05 PROCEDURE — 82948 REAGENT STRIP/BLOOD GLUCOSE: CPT

## 2020-04-05 PROCEDURE — 80053 COMPREHEN METABOLIC PANEL: CPT | Performed by: EMERGENCY MEDICINE

## 2020-04-05 PROCEDURE — 82805 BLOOD GASES W/O2 SATURATION: CPT | Performed by: EMERGENCY MEDICINE

## 2020-04-05 PROCEDURE — 99223 1ST HOSP IP/OBS HIGH 75: CPT | Performed by: NURSE PRACTITIONER

## 2020-04-05 PROCEDURE — 81001 URINALYSIS AUTO W/SCOPE: CPT

## 2020-04-05 PROCEDURE — 96360 HYDRATION IV INFUSION INIT: CPT

## 2020-04-05 PROCEDURE — 83735 ASSAY OF MAGNESIUM: CPT | Performed by: EMERGENCY MEDICINE

## 2020-04-05 PROCEDURE — 96372 THER/PROPH/DIAG INJ SC/IM: CPT

## 2020-04-05 PROCEDURE — 71045 X-RAY EXAM CHEST 1 VIEW: CPT

## 2020-04-05 PROCEDURE — 36415 COLL VENOUS BLD VENIPUNCTURE: CPT | Performed by: EMERGENCY MEDICINE

## 2020-04-05 RX ORDER — OLANZAPINE 10 MG/1
10 TABLET ORAL
COMMUNITY
End: 2020-04-27

## 2020-04-05 RX ORDER — FLUOXETINE HYDROCHLORIDE 20 MG/1
80 CAPSULE ORAL DAILY
Status: DISCONTINUED | OUTPATIENT
Start: 2020-04-06 | End: 2020-04-07 | Stop reason: HOSPADM

## 2020-04-05 RX ORDER — LEVOTHYROXINE SODIUM 0.12 MG/1
250 TABLET ORAL
Status: DISCONTINUED | OUTPATIENT
Start: 2020-04-06 | End: 2020-04-07 | Stop reason: HOSPADM

## 2020-04-05 RX ORDER — ATORVASTATIN CALCIUM 10 MG/1
10 TABLET, FILM COATED ORAL DAILY
Status: DISCONTINUED | OUTPATIENT
Start: 2020-04-06 | End: 2020-04-07 | Stop reason: HOSPADM

## 2020-04-05 RX ORDER — OLANZAPINE 10 MG/1
10 TABLET ORAL
Status: DISCONTINUED | OUTPATIENT
Start: 2020-04-05 | End: 2020-04-07 | Stop reason: HOSPADM

## 2020-04-05 RX ORDER — NICOTINE 21 MG/24HR
1 PATCH, TRANSDERMAL 24 HOURS TRANSDERMAL DAILY
Status: DISCONTINUED | OUTPATIENT
Start: 2020-04-06 | End: 2020-04-07 | Stop reason: HOSPADM

## 2020-04-05 RX ADMIN — SODIUM CHLORIDE 1000 ML: 0.9 INJECTION, SOLUTION INTRAVENOUS at 21:22

## 2020-04-05 RX ADMIN — OLANZAPINE 10 MG: 10 TABLET, FILM COATED ORAL at 23:39

## 2020-04-05 RX ADMIN — SODIUM CHLORIDE 10 UNITS/HR: 9 INJECTION, SOLUTION INTRAVENOUS at 22:26

## 2020-04-05 RX ADMIN — INSULIN LISPRO 8 UNITS: 100 INJECTION, SOLUTION INTRAVENOUS; SUBCUTANEOUS at 21:22

## 2020-04-05 RX ADMIN — SODIUM CHLORIDE 1000 ML: 0.9 INJECTION, SOLUTION INTRAVENOUS at 19:52

## 2020-04-06 ENCOUNTER — APPOINTMENT (INPATIENT)
Dept: RADIOLOGY | Facility: HOSPITAL | Age: 40
DRG: 638 | End: 2020-04-06
Payer: COMMERCIAL

## 2020-04-06 DIAGNOSIS — F33.41 RECURRENT MAJOR DEPRESSIVE DISORDER, IN PARTIAL REMISSION (HCC): ICD-10-CM

## 2020-04-06 DIAGNOSIS — E11.9 TYPE 2 DIABETES MELLITUS WITHOUT COMPLICATION, WITHOUT LONG-TERM CURRENT USE OF INSULIN (HCC): ICD-10-CM

## 2020-04-06 PROBLEM — E87.5 HYPERKALEMIA: Status: RESOLVED | Noted: 2020-04-06 | Resolved: 2020-04-06

## 2020-04-06 PROBLEM — E87.5 HYPERKALEMIA: Status: ACTIVE | Noted: 2020-04-06

## 2020-04-06 PROBLEM — E87.1 HYPONATREMIA: Status: RESOLVED | Noted: 2020-04-05 | Resolved: 2020-04-06

## 2020-04-06 LAB
ANION GAP SERPL CALCULATED.3IONS-SCNC: 13 MMOL/L (ref 4–13)
ANION GAP SERPL CALCULATED.3IONS-SCNC: 9 MMOL/L (ref 4–13)
ATRIAL RATE: 0 BPM
ATRIAL RATE: 105 BPM
BASOPHILS # BLD AUTO: 0.04 THOUSANDS/ΜL (ref 0–0.1)
BASOPHILS NFR BLD AUTO: 1 % (ref 0–1)
BUN SERPL-MCNC: 15 MG/DL (ref 5–25)
BUN SERPL-MCNC: 16 MG/DL (ref 5–25)
CALCIUM SERPL-MCNC: 8.5 MG/DL (ref 8.3–10.1)
CALCIUM SERPL-MCNC: 8.9 MG/DL (ref 8.3–10.1)
CHLORIDE SERPL-SCNC: 100 MMOL/L (ref 100–108)
CHLORIDE SERPL-SCNC: 99 MMOL/L (ref 100–108)
CO2 SERPL-SCNC: 24 MMOL/L (ref 21–32)
CO2 SERPL-SCNC: 27 MMOL/L (ref 21–32)
CREAT SERPL-MCNC: 0.56 MG/DL (ref 0.6–1.3)
CREAT SERPL-MCNC: 0.63 MG/DL (ref 0.6–1.3)
EOSINOPHIL # BLD AUTO: 0.34 THOUSAND/ΜL (ref 0–0.61)
EOSINOPHIL NFR BLD AUTO: 5 % (ref 0–6)
ERYTHROCYTE [DISTWIDTH] IN BLOOD BY AUTOMATED COUNT: 13 % (ref 11.6–15.1)
EST. AVERAGE GLUCOSE BLD GHB EST-MCNC: 212 MG/DL
GFR SERPL CREATININE-BSD FRML MDRD: 122 ML/MIN/1.73SQ M
GFR SERPL CREATININE-BSD FRML MDRD: 128 ML/MIN/1.73SQ M
GLUCOSE SERPL-MCNC: 120 MG/DL (ref 65–140)
GLUCOSE SERPL-MCNC: 141 MG/DL (ref 65–140)
GLUCOSE SERPL-MCNC: 170 MG/DL (ref 65–140)
GLUCOSE SERPL-MCNC: 201 MG/DL (ref 65–140)
GLUCOSE SERPL-MCNC: 207 MG/DL (ref 65–140)
GLUCOSE SERPL-MCNC: 212 MG/DL (ref 65–140)
GLUCOSE SERPL-MCNC: 279 MG/DL (ref 65–140)
GLUCOSE SERPL-MCNC: 298 MG/DL (ref 65–140)
GLUCOSE SERPL-MCNC: 311 MG/DL (ref 65–140)
GLUCOSE SERPL-MCNC: 318 MG/DL (ref 65–140)
GLUCOSE SERPL-MCNC: 346 MG/DL (ref 65–140)
GLUCOSE SERPL-MCNC: 377 MG/DL (ref 65–140)
HBA1C MFR BLD: 9 %
HCT VFR BLD AUTO: 37.9 % (ref 36.5–49.3)
HGB BLD-MCNC: 13.8 G/DL (ref 12–17)
IMM GRANULOCYTES # BLD AUTO: 0.15 THOUSAND/UL (ref 0–0.2)
IMM GRANULOCYTES NFR BLD AUTO: 2 % (ref 0–2)
LYMPHOCYTES # BLD AUTO: 2.5 THOUSANDS/ΜL (ref 0.6–4.47)
LYMPHOCYTES NFR BLD AUTO: 33 % (ref 14–44)
MCH RBC QN AUTO: 30.3 PG (ref 26.8–34.3)
MCHC RBC AUTO-ENTMCNC: 36.4 G/DL (ref 31.4–37.4)
MCV RBC AUTO: 83 FL (ref 82–98)
MONOCYTES # BLD AUTO: 0.36 THOUSAND/ΜL (ref 0.17–1.22)
MONOCYTES NFR BLD AUTO: 5 % (ref 4–12)
NEUTROPHILS # BLD AUTO: 4.18 THOUSANDS/ΜL (ref 1.85–7.62)
NEUTS SEG NFR BLD AUTO: 54 % (ref 43–75)
NRBC BLD AUTO-RTO: 0 /100 WBCS
P AXIS: 46 DEGREES
PLATELET # BLD AUTO: 317 THOUSANDS/UL (ref 149–390)
PMV BLD AUTO: 10.1 FL (ref 8.9–12.7)
POTASSIUM SERPL-SCNC: 5 MMOL/L (ref 3.5–5.3)
POTASSIUM SERPL-SCNC: 6.1 MMOL/L (ref 3.5–5.3)
PR INTERVAL: 150 MS
QRS AXIS: 0 DEGREES
QRS AXIS: 47 DEGREES
QRSD INTERVAL: 0 MS
QRSD INTERVAL: 90 MS
QT INTERVAL: 0 MS
QT INTERVAL: 352 MS
QTC INTERVAL: 0 MS
QTC INTERVAL: 465 MS
RBC # BLD AUTO: 4.56 MILLION/UL (ref 3.88–5.62)
SODIUM SERPL-SCNC: 136 MMOL/L (ref 136–145)
SODIUM SERPL-SCNC: 136 MMOL/L (ref 136–145)
T WAVE AXIS: -6 DEGREES
T WAVE AXIS: 0 DEGREES
VENTRICULAR RATE: 0 BPM
VENTRICULAR RATE: 105 BPM
WBC # BLD AUTO: 7.57 THOUSAND/UL (ref 4.31–10.16)

## 2020-04-06 PROCEDURE — 93005 ELECTROCARDIOGRAM TRACING: CPT

## 2020-04-06 PROCEDURE — 82948 REAGENT STRIP/BLOOD GLUCOSE: CPT

## 2020-04-06 PROCEDURE — 85025 COMPLETE CBC W/AUTO DIFF WBC: CPT | Performed by: NURSE PRACTITIONER

## 2020-04-06 PROCEDURE — 80048 BASIC METABOLIC PNL TOTAL CA: CPT | Performed by: NURSE PRACTITIONER

## 2020-04-06 PROCEDURE — 93010 ELECTROCARDIOGRAM REPORT: CPT | Performed by: INTERNAL MEDICINE

## 2020-04-06 PROCEDURE — 99232 SBSQ HOSP IP/OBS MODERATE 35: CPT | Performed by: INTERNAL MEDICINE

## 2020-04-06 RX ORDER — METFORMIN HYDROCHLORIDE 500 MG/1
500 TABLET, EXTENDED RELEASE ORAL 2 TIMES DAILY WITH MEALS
Qty: 60 TABLET | Refills: 0 | Status: SHIPPED | OUTPATIENT
Start: 2020-04-06 | End: 2020-04-07 | Stop reason: HOSPADM

## 2020-04-06 RX ORDER — FLUOXETINE HYDROCHLORIDE 40 MG/1
80 CAPSULE ORAL DAILY
Qty: 60 CAPSULE | Refills: 0 | Status: SHIPPED | OUTPATIENT
Start: 2020-04-06 | End: 2020-05-13 | Stop reason: SDUPTHER

## 2020-04-06 RX ADMIN — LEVOTHYROXINE SODIUM 250 MCG: 125 TABLET ORAL at 06:22

## 2020-04-06 RX ADMIN — METFORMIN HYDROCHLORIDE 1000 MG: 500 TABLET, FILM COATED ORAL at 16:33

## 2020-04-06 RX ADMIN — FLUOXETINE 80 MG: 20 CAPSULE ORAL at 08:47

## 2020-04-06 RX ADMIN — OLANZAPINE 10 MG: 10 TABLET, FILM COATED ORAL at 21:21

## 2020-04-06 RX ADMIN — METFORMIN HYDROCHLORIDE 1000 MG: 500 TABLET, FILM COATED ORAL at 12:49

## 2020-04-06 RX ADMIN — ATORVASTATIN CALCIUM 10 MG: 10 TABLET, FILM COATED ORAL at 08:47

## 2020-04-07 VITALS
TEMPERATURE: 98.5 F | BODY MASS INDEX: 38.88 KG/M2 | SYSTOLIC BLOOD PRESSURE: 132 MMHG | RESPIRATION RATE: 18 BRPM | OXYGEN SATURATION: 96 % | WEIGHT: 287.04 LBS | HEART RATE: 106 BPM | DIASTOLIC BLOOD PRESSURE: 70 MMHG | HEIGHT: 72 IN

## 2020-04-07 LAB
GLUCOSE SERPL-MCNC: 407 MG/DL (ref 65–140)
GLUCOSE SERPL-MCNC: 435 MG/DL (ref 65–140)
GLUCOSE SERPL-MCNC: 495 MG/DL (ref 65–140)

## 2020-04-07 PROCEDURE — 82948 REAGENT STRIP/BLOOD GLUCOSE: CPT

## 2020-04-07 PROCEDURE — 99239 HOSP IP/OBS DSCHRG MGMT >30: CPT | Performed by: HOSPITALIST

## 2020-04-07 RX ORDER — GLIPIZIDE 2.5 MG/1
2.5 TABLET, EXTENDED RELEASE ORAL
Status: COMPLETED | OUTPATIENT
Start: 2020-04-07 | End: 2020-04-07

## 2020-04-07 RX ORDER — LEVOTHYROXINE SODIUM 0.12 MG/1
250 TABLET ORAL
Qty: 60 TABLET | Refills: 0 | Status: SHIPPED | OUTPATIENT
Start: 2020-04-08 | End: 2020-05-01 | Stop reason: DRUGHIGH

## 2020-04-07 RX ORDER — GLIPIZIDE 2.5 MG/1
2.5 TABLET, EXTENDED RELEASE ORAL
Status: DISCONTINUED | OUTPATIENT
Start: 2020-04-08 | End: 2020-04-07 | Stop reason: HOSPADM

## 2020-04-07 RX ORDER — GLIPIZIDE 2.5 MG/1
2.5 TABLET, EXTENDED RELEASE ORAL
Qty: 30 TABLET | Refills: 0 | Status: SHIPPED | OUTPATIENT
Start: 2020-04-08 | End: 2020-04-15 | Stop reason: ALTCHOICE

## 2020-04-07 RX ADMIN — LEVOTHYROXINE SODIUM 250 MCG: 125 TABLET ORAL at 06:04

## 2020-04-07 RX ADMIN — FLUOXETINE 80 MG: 20 CAPSULE ORAL at 08:25

## 2020-04-07 RX ADMIN — GLIPIZIDE 2.5 MG: 2.5 TABLET, FILM COATED, EXTENDED RELEASE ORAL at 11:37

## 2020-04-07 RX ADMIN — METFORMIN HYDROCHLORIDE 1000 MG: 500 TABLET, FILM COATED ORAL at 08:25

## 2020-04-07 RX ADMIN — ATORVASTATIN CALCIUM 10 MG: 10 TABLET, FILM COATED ORAL at 08:25

## 2020-04-08 ENCOUNTER — TRANSITIONAL CARE MANAGEMENT (OUTPATIENT)
Dept: FAMILY MEDICINE CLINIC | Facility: CLINIC | Age: 40
End: 2020-04-08

## 2020-04-09 ENCOUNTER — OFFICE VISIT (OUTPATIENT)
Dept: FAMILY MEDICINE CLINIC | Facility: CLINIC | Age: 40
End: 2020-04-09
Payer: COMMERCIAL

## 2020-04-09 VITALS — WEIGHT: 285 LBS | HEART RATE: 80 BPM | HEIGHT: 72 IN | BODY MASS INDEX: 38.6 KG/M2

## 2020-04-09 DIAGNOSIS — E78.5 HYPERLIPIDEMIA, UNSPECIFIED HYPERLIPIDEMIA TYPE: ICD-10-CM

## 2020-04-09 DIAGNOSIS — F33.2 SEVERE EPISODE OF RECURRENT MAJOR DEPRESSIVE DISORDER, WITHOUT PSYCHOTIC FEATURES (HCC): ICD-10-CM

## 2020-04-09 DIAGNOSIS — E11.65 TYPE 2 DIABETES MELLITUS WITH HYPERGLYCEMIA, WITHOUT LONG-TERM CURRENT USE OF INSULIN (HCC): Primary | ICD-10-CM

## 2020-04-09 DIAGNOSIS — I10 ESSENTIAL HYPERTENSION: ICD-10-CM

## 2020-04-09 DIAGNOSIS — E89.0 HYPOTHYROIDISM, POSTSURGICAL: ICD-10-CM

## 2020-04-09 PROCEDURE — 99496 TRANSJ CARE MGMT HIGH F2F 7D: CPT | Performed by: FAMILY MEDICINE

## 2020-04-09 RX ORDER — LISINOPRIL AND HYDROCHLOROTHIAZIDE 12.5; 1 MG/1; MG/1
1 TABLET ORAL DAILY
Qty: 90 TABLET | Refills: 0 | Status: SHIPPED | OUTPATIENT
Start: 2020-04-09 | End: 2020-05-07 | Stop reason: ALTCHOICE

## 2020-04-09 RX ORDER — ATORVASTATIN CALCIUM 10 MG/1
10 TABLET, FILM COATED ORAL DAILY
Qty: 90 TABLET | Refills: 3 | Status: SHIPPED | OUTPATIENT
Start: 2020-04-09

## 2020-04-10 DIAGNOSIS — E11.65 TYPE 2 DIABETES MELLITUS WITH HYPERGLYCEMIA, WITHOUT LONG-TERM CURRENT USE OF INSULIN (HCC): Primary | ICD-10-CM

## 2020-04-10 RX ORDER — INSULIN ASPART 100 [IU]/ML
15 INJECTION, SOLUTION INTRAVENOUS; SUBCUTANEOUS 3 TIMES DAILY
Qty: 3 ML | Refills: 3 | Status: SHIPPED | OUTPATIENT
Start: 2020-04-10 | End: 2020-04-11 | Stop reason: SDUPTHER

## 2020-04-11 DIAGNOSIS — E11.65 TYPE 2 DIABETES MELLITUS WITH HYPERGLYCEMIA, WITHOUT LONG-TERM CURRENT USE OF INSULIN (HCC): ICD-10-CM

## 2020-04-11 RX ORDER — INSULIN ASPART 100 [IU]/ML
25 INJECTION, SOLUTION INTRAVENOUS; SUBCUTANEOUS 3 TIMES DAILY
Qty: 5 PEN | Refills: 3 | Status: SHIPPED | OUTPATIENT
Start: 2020-04-11 | End: 2020-04-27 | Stop reason: SDUPTHER

## 2020-04-13 DIAGNOSIS — E11.65 TYPE 2 DIABETES MELLITUS WITH HYPERGLYCEMIA, WITHOUT LONG-TERM CURRENT USE OF INSULIN (HCC): ICD-10-CM

## 2020-04-14 ENCOUNTER — TELEPHONE (OUTPATIENT)
Dept: FAMILY MEDICINE CLINIC | Facility: CLINIC | Age: 40
End: 2020-04-14

## 2020-04-14 DIAGNOSIS — E11.65 TYPE 2 DIABETES MELLITUS WITH HYPERGLYCEMIA, WITHOUT LONG-TERM CURRENT USE OF INSULIN (HCC): Primary | ICD-10-CM

## 2020-04-14 DIAGNOSIS — E55.9 VITAMIN D DEFICIENCY: ICD-10-CM

## 2020-04-14 RX ORDER — CHOLECALCIFEROL (VITAMIN D3) 1250 MCG
1 CAPSULE ORAL WEEKLY
Qty: 8 CAPSULE | Refills: 0 | Status: CANCELLED | OUTPATIENT
Start: 2020-04-14

## 2020-04-15 ENCOUNTER — TELEMEDICINE (OUTPATIENT)
Dept: ENDOCRINOLOGY | Facility: CLINIC | Age: 40
End: 2020-04-15
Payer: COMMERCIAL

## 2020-04-15 DIAGNOSIS — E11.9 TYPE 2 DIABETES MELLITUS WITHOUT COMPLICATION, WITHOUT LONG-TERM CURRENT USE OF INSULIN (HCC): Primary | ICD-10-CM

## 2020-04-15 DIAGNOSIS — E11.65 TYPE 2 DIABETES MELLITUS WITH HYPERGLYCEMIA, WITHOUT LONG-TERM CURRENT USE OF INSULIN (HCC): ICD-10-CM

## 2020-04-15 DIAGNOSIS — E55.9 VITAMIN D DEFICIENCY: ICD-10-CM

## 2020-04-15 DIAGNOSIS — E89.0 POSTOPERATIVE HYPOTHYROIDISM: ICD-10-CM

## 2020-04-15 DIAGNOSIS — E29.1 HYPOGONADISM IN MALE: ICD-10-CM

## 2020-04-15 DIAGNOSIS — E11.9 TYPE 2 DIABETES MELLITUS WITHOUT COMPLICATION, WITHOUT LONG-TERM CURRENT USE OF INSULIN (HCC): ICD-10-CM

## 2020-04-15 DIAGNOSIS — E78.5 HYPERLIPIDEMIA, UNSPECIFIED HYPERLIPIDEMIA TYPE: ICD-10-CM

## 2020-04-15 DIAGNOSIS — R53.83 FATIGUE, UNSPECIFIED TYPE: ICD-10-CM

## 2020-04-15 DIAGNOSIS — C73 PAPILLARY THYROID CARCINOMA (HCC): ICD-10-CM

## 2020-04-15 DIAGNOSIS — E11.65 TYPE 2 DIABETES MELLITUS WITH HYPERGLYCEMIA, WITHOUT LONG-TERM CURRENT USE OF INSULIN (HCC): Primary | ICD-10-CM

## 2020-04-15 DIAGNOSIS — E66.9 OBESITY: ICD-10-CM

## 2020-04-15 PROCEDURE — 99215 OFFICE O/P EST HI 40 MIN: CPT | Performed by: INTERNAL MEDICINE

## 2020-04-15 RX ORDER — FLASH GLUCOSE SENSOR
1 KIT MISCELLANEOUS
Qty: 6 EACH | Refills: 1 | Status: SHIPPED | OUTPATIENT
Start: 2020-04-15

## 2020-04-15 RX ORDER — FLASH GLUCOSE SCANNING READER
1 EACH MISCELLANEOUS AS NEEDED
Qty: 1 DEVICE | Refills: 0 | Status: SHIPPED | OUTPATIENT
Start: 2020-04-15

## 2020-04-15 RX ORDER — CHLORAL HYDRATE 500 MG
1000 CAPSULE ORAL 2 TIMES DAILY
Refills: 0
Start: 2020-04-15

## 2020-04-16 ENCOUNTER — TELEMEDICINE (OUTPATIENT)
Dept: FAMILY MEDICINE CLINIC | Facility: CLINIC | Age: 40
End: 2020-04-16
Payer: COMMERCIAL

## 2020-04-16 VITALS — BODY MASS INDEX: 37.93 KG/M2 | HEIGHT: 72 IN | WEIGHT: 280 LBS

## 2020-04-16 DIAGNOSIS — C73 PAPILLARY CARCINOMA OF THYROID (HCC): ICD-10-CM

## 2020-04-16 DIAGNOSIS — E11.65 TYPE 2 DIABETES MELLITUS WITH HYPERGLYCEMIA, WITHOUT LONG-TERM CURRENT USE OF INSULIN (HCC): Primary | ICD-10-CM

## 2020-04-16 DIAGNOSIS — E89.0 HYPOTHYROIDISM, POSTSURGICAL: ICD-10-CM

## 2020-04-16 DIAGNOSIS — F31.70 BIPOLAR DISORDER IN FULL REMISSION, MOST RECENT EPISODE UNSPECIFIED TYPE (HCC): ICD-10-CM

## 2020-04-16 DIAGNOSIS — F33.2 SEVERE EPISODE OF RECURRENT MAJOR DEPRESSIVE DISORDER, WITHOUT PSYCHOTIC FEATURES (HCC): ICD-10-CM

## 2020-04-16 DIAGNOSIS — G47.33 OSA (OBSTRUCTIVE SLEEP APNEA): ICD-10-CM

## 2020-04-16 DIAGNOSIS — E78.5 HYPERLIPIDEMIA, UNSPECIFIED HYPERLIPIDEMIA TYPE: ICD-10-CM

## 2020-04-16 PROBLEM — F17.200 TOBACCO DEPENDENCE: Status: RESOLVED | Noted: 2020-04-05 | Resolved: 2020-04-16

## 2020-04-16 PROCEDURE — 99214 OFFICE O/P EST MOD 30 MIN: CPT | Performed by: FAMILY MEDICINE

## 2020-04-27 DIAGNOSIS — E11.65 TYPE 2 DIABETES MELLITUS WITH HYPERGLYCEMIA, WITHOUT LONG-TERM CURRENT USE OF INSULIN (HCC): ICD-10-CM

## 2020-04-27 RX ORDER — INSULIN ASPART 100 [IU]/ML
40 INJECTION, SOLUTION INTRAVENOUS; SUBCUTANEOUS 3 TIMES DAILY
Qty: 5 PEN | Refills: 3 | Status: SHIPPED | OUTPATIENT
Start: 2020-04-27 | End: 2020-05-21 | Stop reason: SDUPTHER

## 2020-04-27 RX ORDER — INSULIN ASPART 100 [IU]/ML
25 INJECTION, SOLUTION INTRAVENOUS; SUBCUTANEOUS 3 TIMES DAILY
Qty: 5 PEN | Refills: 3 | Status: SHIPPED | OUTPATIENT
Start: 2020-04-27 | End: 2020-04-27

## 2020-04-29 DIAGNOSIS — E11.9 TYPE 2 DIABETES MELLITUS WITHOUT COMPLICATION, WITHOUT LONG-TERM CURRENT USE OF INSULIN (HCC): ICD-10-CM

## 2020-05-01 ENCOUNTER — APPOINTMENT (OUTPATIENT)
Dept: LAB | Facility: MEDICAL CENTER | Age: 40
End: 2020-05-01
Payer: COMMERCIAL

## 2020-05-01 DIAGNOSIS — C73 PAPILLARY THYROID CARCINOMA (HCC): ICD-10-CM

## 2020-05-01 DIAGNOSIS — E55.9 VITAMIN D DEFICIENCY: ICD-10-CM

## 2020-05-01 DIAGNOSIS — E29.1 HYPOGONADISM IN MALE: ICD-10-CM

## 2020-05-01 DIAGNOSIS — E89.0 HYPOTHYROIDISM, POSTSURGICAL: ICD-10-CM

## 2020-05-01 DIAGNOSIS — E11.9 TYPE 2 DIABETES MELLITUS WITHOUT COMPLICATION, WITHOUT LONG-TERM CURRENT USE OF INSULIN (HCC): ICD-10-CM

## 2020-05-01 DIAGNOSIS — E89.0 POSTOPERATIVE HYPOTHYROIDISM: ICD-10-CM

## 2020-05-01 LAB
25(OH)D3 SERPL-MCNC: 19 NG/ML (ref 30–100)
ANION GAP SERPL CALCULATED.3IONS-SCNC: 5 MMOL/L (ref 4–13)
BUN SERPL-MCNC: 23 MG/DL (ref 5–25)
CALCIUM SERPL-MCNC: 9.9 MG/DL (ref 8.3–10.1)
CHLORIDE SERPL-SCNC: 103 MMOL/L (ref 100–108)
CO2 SERPL-SCNC: 26 MMOL/L (ref 21–32)
CREAT SERPL-MCNC: 0.77 MG/DL (ref 0.6–1.3)
CREAT UR-MCNC: 179 MG/DL
EST. AVERAGE GLUCOSE BLD GHB EST-MCNC: 246 MG/DL
GFR SERPL CREATININE-BSD FRML MDRD: 112 ML/MIN/1.73SQ M
GLUCOSE P FAST SERPL-MCNC: 175 MG/DL (ref 65–99)
HBA1C MFR BLD: 10.2 %
MICROALBUMIN UR-MCNC: 24.2 MG/L (ref 0–20)
MICROALBUMIN/CREAT 24H UR: 14 MG/G CREATININE (ref 0–30)
POTASSIUM SERPL-SCNC: 4.1 MMOL/L (ref 3.5–5.3)
SODIUM SERPL-SCNC: 134 MMOL/L (ref 136–145)
T3FREE SERPL-MCNC: 3.98 PG/ML (ref 2.3–4.2)
T4 FREE SERPL-MCNC: 1.59 NG/DL (ref 0.76–1.46)
T4 FREE SERPL-MCNC: 1.65 NG/DL (ref 0.76–1.46)
TSH SERPL DL<=0.05 MIU/L-ACNC: 0.01 UIU/ML (ref 0.36–3.74)

## 2020-05-01 PROCEDURE — 82306 VITAMIN D 25 HYDROXY: CPT

## 2020-05-01 PROCEDURE — 82043 UR ALBUMIN QUANTITATIVE: CPT

## 2020-05-01 PROCEDURE — 84443 ASSAY THYROID STIM HORMONE: CPT

## 2020-05-01 PROCEDURE — 86800 THYROGLOBULIN ANTIBODY: CPT

## 2020-05-01 PROCEDURE — 84402 ASSAY OF FREE TESTOSTERONE: CPT

## 2020-05-01 PROCEDURE — 83036 HEMOGLOBIN GLYCOSYLATED A1C: CPT

## 2020-05-01 PROCEDURE — 84481 FREE ASSAY (FT-3): CPT

## 2020-05-01 PROCEDURE — 84403 ASSAY OF TOTAL TESTOSTERONE: CPT

## 2020-05-01 PROCEDURE — 84432 ASSAY OF THYROGLOBULIN: CPT

## 2020-05-01 PROCEDURE — 36415 COLL VENOUS BLD VENIPUNCTURE: CPT

## 2020-05-01 PROCEDURE — 80048 BASIC METABOLIC PNL TOTAL CA: CPT

## 2020-05-01 PROCEDURE — 84439 ASSAY OF FREE THYROXINE: CPT

## 2020-05-01 PROCEDURE — 82570 ASSAY OF URINE CREATININE: CPT

## 2020-05-01 RX ORDER — LEVOTHYROXINE SODIUM 0.12 MG/1
TABLET ORAL
Qty: 60 TABLET | Refills: 0
Start: 2020-05-01

## 2020-05-05 LAB
THYROGLOB AB SERPL-ACNC: <1 IU/ML (ref 0–0.9)
THYROGLOB SERPL-MCNC: 0.5 NG/ML (ref 1.4–29.2)

## 2020-05-06 LAB
DEPRECATED TESTOST FREE FR SERPL: 78.9 NG/DL (ref 40–250)
TESTOST SERPL-MCNC: 193 NG/DL (ref 264–916)
TESTOSTERONE.FREE+WB MFR SERPL: 40.9 % (ref 9–46)

## 2020-05-07 ENCOUNTER — TELEMEDICINE (OUTPATIENT)
Dept: FAMILY MEDICINE CLINIC | Facility: CLINIC | Age: 40
End: 2020-05-07
Payer: COMMERCIAL

## 2020-05-07 VITALS — BODY MASS INDEX: 37.97 KG/M2 | WEIGHT: 280 LBS | TEMPERATURE: 97.5 F

## 2020-05-07 DIAGNOSIS — J02.9 SORE THROAT: ICD-10-CM

## 2020-05-07 DIAGNOSIS — E11.65 TYPE 2 DIABETES MELLITUS WITH HYPERGLYCEMIA, WITHOUT LONG-TERM CURRENT USE OF INSULIN (HCC): ICD-10-CM

## 2020-05-07 DIAGNOSIS — F33.2 SEVERE EPISODE OF RECURRENT MAJOR DEPRESSIVE DISORDER, WITHOUT PSYCHOTIC FEATURES (HCC): ICD-10-CM

## 2020-05-07 DIAGNOSIS — Z20.828 EXPOSURE TO SARS-ASSOCIATED CORONAVIRUS: ICD-10-CM

## 2020-05-07 DIAGNOSIS — I10 ESSENTIAL HYPERTENSION: ICD-10-CM

## 2020-05-07 DIAGNOSIS — E89.0 HYPOTHYROIDISM, POSTSURGICAL: Primary | ICD-10-CM

## 2020-05-07 PROCEDURE — U0003 INFECTIOUS AGENT DETECTION BY NUCLEIC ACID (DNA OR RNA); SEVERE ACUTE RESPIRATORY SYNDROME CORONAVIRUS 2 (SARS-COV-2) (CORONAVIRUS DISEASE [COVID-19]), AMPLIFIED PROBE TECHNIQUE, MAKING USE OF HIGH THROUGHPUT TECHNOLOGIES AS DESCRIBED BY CMS-2020-01-R: HCPCS

## 2020-05-07 PROCEDURE — 99214 OFFICE O/P EST MOD 30 MIN: CPT | Performed by: FAMILY MEDICINE

## 2020-05-07 RX ORDER — ARIPIPRAZOLE 10 MG/1
10 TABLET ORAL
COMMUNITY
Start: 2020-04-27

## 2020-05-07 RX ORDER — AMOXICILLIN 875 MG/1
875 TABLET, COATED ORAL 2 TIMES DAILY
Qty: 14 TABLET | Refills: 0 | Status: SHIPPED | OUTPATIENT
Start: 2020-05-07 | End: 2020-05-14

## 2020-05-07 RX ORDER — OLMESARTAN MEDOXOMIL AND HYDROCHLOROTHIAZIDE 20/12.5 20; 12.5 MG/1; MG/1
1 TABLET ORAL DAILY
Qty: 90 TABLET | Refills: 0 | Status: SHIPPED | OUTPATIENT
Start: 2020-05-07

## 2020-05-07 RX ORDER — METFORMIN HYDROCHLORIDE 500 MG/1
TABLET, EXTENDED RELEASE ORAL
COMMUNITY
Start: 2020-05-04 | End: 2020-05-14

## 2020-05-08 ENCOUNTER — TELEPHONE (OUTPATIENT)
Dept: ENDOCRINOLOGY | Facility: CLINIC | Age: 40
End: 2020-05-08

## 2020-05-08 DIAGNOSIS — I10 ESSENTIAL HYPERTENSION: Primary | ICD-10-CM

## 2020-05-08 RX ORDER — HYDROCHLOROTHIAZIDE 12.5 MG/1
12.5 TABLET ORAL DAILY
Qty: 90 TABLET | Refills: 3 | Status: SHIPPED | OUTPATIENT
Start: 2020-05-08

## 2020-05-08 RX ORDER — OLMESARTAN MEDOXOMIL 20 MG/1
20 TABLET ORAL DAILY
Qty: 90 TABLET | Refills: 3 | Status: SHIPPED | OUTPATIENT
Start: 2020-05-08

## 2020-05-08 RX ORDER — OLMESARTAN MEDOXOMIL AND HYDROCHLOROTHIAZIDE 20/12.5 20; 12.5 MG/1; MG/1
TABLET ORAL
Qty: 90 TABLET | Refills: 0 | OUTPATIENT
Start: 2020-05-08

## 2020-05-09 LAB — SARS-COV-2 RNA SPEC QL NAA+PROBE: NOT DETECTED

## 2020-05-13 DIAGNOSIS — F33.41 RECURRENT MAJOR DEPRESSIVE DISORDER, IN PARTIAL REMISSION (HCC): ICD-10-CM

## 2020-05-13 RX ORDER — FLUOXETINE HYDROCHLORIDE 40 MG/1
80 CAPSULE ORAL DAILY
Qty: 180 CAPSULE | Refills: 0 | Status: SHIPPED | OUTPATIENT
Start: 2020-05-13 | End: 2020-05-14 | Stop reason: SDUPTHER

## 2020-05-14 ENCOUNTER — TELEMEDICINE (OUTPATIENT)
Dept: FAMILY MEDICINE CLINIC | Facility: CLINIC | Age: 40
End: 2020-05-14
Payer: COMMERCIAL

## 2020-05-14 ENCOUNTER — TELEPHONE (OUTPATIENT)
Dept: SLEEP CENTER | Facility: CLINIC | Age: 40
End: 2020-05-14

## 2020-05-14 VITALS
DIASTOLIC BLOOD PRESSURE: 83 MMHG | TEMPERATURE: 98.6 F | WEIGHT: 280 LBS | SYSTOLIC BLOOD PRESSURE: 124 MMHG | BODY MASS INDEX: 37.97 KG/M2

## 2020-05-14 DIAGNOSIS — F33.41 RECURRENT MAJOR DEPRESSIVE DISORDER, IN PARTIAL REMISSION (HCC): ICD-10-CM

## 2020-05-14 DIAGNOSIS — G47.33 OSA (OBSTRUCTIVE SLEEP APNEA): ICD-10-CM

## 2020-05-14 DIAGNOSIS — E89.0 HYPOTHYROIDISM, POSTSURGICAL: ICD-10-CM

## 2020-05-14 DIAGNOSIS — E11.65 TYPE 2 DIABETES MELLITUS WITH HYPERGLYCEMIA, WITHOUT LONG-TERM CURRENT USE OF INSULIN (HCC): Primary | ICD-10-CM

## 2020-05-14 PROCEDURE — 99214 OFFICE O/P EST MOD 30 MIN: CPT | Performed by: FAMILY MEDICINE

## 2020-05-14 RX ORDER — FLUOXETINE HYDROCHLORIDE 40 MG/1
80 CAPSULE ORAL DAILY
Qty: 180 CAPSULE | Refills: 0 | Status: SHIPPED | OUTPATIENT
Start: 2020-05-14

## 2020-05-15 ENCOUNTER — TELEMEDICINE (OUTPATIENT)
Dept: SLEEP CENTER | Facility: CLINIC | Age: 40
End: 2020-05-15
Payer: COMMERCIAL

## 2020-05-15 DIAGNOSIS — E66.9 OBESITY (BMI 30-39.9): ICD-10-CM

## 2020-05-15 DIAGNOSIS — R00.2 PALPITATION: ICD-10-CM

## 2020-05-15 DIAGNOSIS — G47.00 INSOMNIA, UNSPECIFIED TYPE: ICD-10-CM

## 2020-05-15 DIAGNOSIS — R06.02 SOB (SHORTNESS OF BREATH): ICD-10-CM

## 2020-05-15 DIAGNOSIS — Z72.0 TOBACCO ABUSE: ICD-10-CM

## 2020-05-15 DIAGNOSIS — G47.10 HYPERSOMNIA: ICD-10-CM

## 2020-05-15 DIAGNOSIS — G47.33 OSA (OBSTRUCTIVE SLEEP APNEA): Primary | ICD-10-CM

## 2020-05-15 PROCEDURE — 99214 OFFICE O/P EST MOD 30 MIN: CPT | Performed by: INTERNAL MEDICINE

## 2020-05-18 ENCOUNTER — HOSPITAL ENCOUNTER (OUTPATIENT)
Dept: RADIOLOGY | Facility: MEDICAL CENTER | Age: 40
Discharge: HOME | End: 2020-05-18
Payer: COMMERCIAL

## 2020-05-18 DIAGNOSIS — E89.0 HYPOTHYROIDISM, POSTSURGICAL: ICD-10-CM

## 2020-05-18 PROCEDURE — 76536 US EXAM OF HEAD AND NECK: CPT

## 2020-05-21 DIAGNOSIS — E11.65 TYPE 2 DIABETES MELLITUS WITH HYPERGLYCEMIA, WITHOUT LONG-TERM CURRENT USE OF INSULIN (HCC): ICD-10-CM

## 2020-05-26 RX ORDER — INSULIN ASPART 100 [IU]/ML
40 INJECTION, SOLUTION INTRAVENOUS; SUBCUTANEOUS 3 TIMES DAILY
Qty: 5 PEN | Refills: 3 | Status: SHIPPED | OUTPATIENT
Start: 2020-05-26 | End: 2020-08-24

## 2020-05-28 DIAGNOSIS — E11.9 TYPE 2 DIABETES MELLITUS WITHOUT COMPLICATION, WITHOUT LONG-TERM CURRENT USE OF INSULIN (HCC): ICD-10-CM

## 2020-06-02 ENCOUNTER — OFFICE VISIT (OUTPATIENT)
Dept: OBGYN CLINIC | Facility: CLINIC | Age: 40
End: 2020-06-02
Payer: COMMERCIAL

## 2020-06-02 ENCOUNTER — TELEPHONE (OUTPATIENT)
Dept: OBGYN CLINIC | Facility: HOSPITAL | Age: 40
End: 2020-06-02

## 2020-06-02 VITALS
HEART RATE: 118 BPM | WEIGHT: 290 LBS | BODY MASS INDEX: 39.28 KG/M2 | HEIGHT: 72 IN | DIASTOLIC BLOOD PRESSURE: 78 MMHG | TEMPERATURE: 99 F | SYSTOLIC BLOOD PRESSURE: 115 MMHG

## 2020-06-02 DIAGNOSIS — M77.12 LATERAL EPICONDYLITIS OF LEFT ELBOW: Primary | ICD-10-CM

## 2020-06-02 DIAGNOSIS — M79.645 FINGER PAIN, LEFT: ICD-10-CM

## 2020-06-02 PROCEDURE — 99213 OFFICE O/P EST LOW 20 MIN: CPT | Performed by: ORTHOPAEDIC SURGERY

## 2020-06-02 PROCEDURE — 3008F BODY MASS INDEX DOCD: CPT | Performed by: ORTHOPAEDIC SURGERY

## 2020-06-02 PROCEDURE — 3078F DIAST BP <80 MM HG: CPT | Performed by: ORTHOPAEDIC SURGERY

## 2020-06-02 PROCEDURE — 20551 NJX 1 TENDON ORIGIN/INSJ: CPT | Performed by: ORTHOPAEDIC SURGERY

## 2020-06-02 PROCEDURE — 20600 DRAIN/INJ JOINT/BURSA W/O US: CPT | Performed by: ORTHOPAEDIC SURGERY

## 2020-06-02 PROCEDURE — 1111F DSCHRG MED/CURRENT MED MERGE: CPT | Performed by: ORTHOPAEDIC SURGERY

## 2020-06-02 PROCEDURE — 3074F SYST BP LT 130 MM HG: CPT | Performed by: ORTHOPAEDIC SURGERY

## 2020-06-02 PROCEDURE — 3060F POS MICROALBUMINURIA REV: CPT | Performed by: ORTHOPAEDIC SURGERY

## 2020-06-02 PROCEDURE — 3066F NEPHROPATHY DOC TX: CPT | Performed by: ORTHOPAEDIC SURGERY

## 2020-06-02 PROCEDURE — 3046F HEMOGLOBIN A1C LEVEL >9.0%: CPT | Performed by: ORTHOPAEDIC SURGERY

## 2020-06-02 RX ORDER — TRIAMCINOLONE ACETONIDE 40 MG/ML
20 INJECTION, SUSPENSION INTRA-ARTICULAR; INTRAMUSCULAR
Status: COMPLETED | OUTPATIENT
Start: 2020-06-02 | End: 2020-06-02

## 2020-06-02 RX ORDER — LIDOCAINE HYDROCHLORIDE 5 MG/ML
0.5 INJECTION, SOLUTION INFILTRATION; PERINEURAL
Status: COMPLETED | OUTPATIENT
Start: 2020-06-02 | End: 2020-06-02

## 2020-06-02 RX ADMIN — TRIAMCINOLONE ACETONIDE 20 MG: 40 INJECTION, SUSPENSION INTRA-ARTICULAR; INTRAMUSCULAR at 14:20

## 2020-06-02 RX ADMIN — TRIAMCINOLONE ACETONIDE 20 MG: 40 INJECTION, SUSPENSION INTRA-ARTICULAR; INTRAMUSCULAR at 14:21

## 2020-06-02 RX ADMIN — LIDOCAINE HYDROCHLORIDE 0.5 ML: 5 INJECTION, SOLUTION INFILTRATION; PERINEURAL at 14:20

## 2020-06-02 RX ADMIN — LIDOCAINE HYDROCHLORIDE 0.5 ML: 5 INJECTION, SOLUTION INFILTRATION; PERINEURAL at 14:21

## 2020-06-23 ENCOUNTER — OFFICE VISIT (OUTPATIENT)
Dept: URGENT CARE | Facility: MEDICAL CENTER | Age: 40
End: 2020-06-23
Payer: COMMERCIAL

## 2020-06-23 VITALS
HEART RATE: 108 BPM | RESPIRATION RATE: 18 BRPM | DIASTOLIC BLOOD PRESSURE: 85 MMHG | TEMPERATURE: 98.3 F | SYSTOLIC BLOOD PRESSURE: 132 MMHG | HEIGHT: 72 IN | OXYGEN SATURATION: 97 % | WEIGHT: 311 LBS | BODY MASS INDEX: 42.12 KG/M2

## 2020-06-23 DIAGNOSIS — M54.41 ACUTE BILATERAL LOW BACK PAIN WITH BILATERAL SCIATICA: Primary | ICD-10-CM

## 2020-06-23 DIAGNOSIS — M54.42 ACUTE BILATERAL LOW BACK PAIN WITH BILATERAL SCIATICA: Primary | ICD-10-CM

## 2020-06-23 PROCEDURE — 99213 OFFICE O/P EST LOW 20 MIN: CPT | Performed by: PHYSICIAN ASSISTANT

## 2020-06-23 RX ORDER — CYCLOBENZAPRINE HCL 10 MG
10 TABLET ORAL 3 TIMES DAILY PRN
Qty: 20 TABLET | Refills: 0 | Status: SHIPPED | OUTPATIENT
Start: 2020-06-23

## 2020-08-21 DIAGNOSIS — E11.9 TYPE 2 DIABETES MELLITUS WITHOUT COMPLICATION, WITHOUT LONG-TERM CURRENT USE OF INSULIN (HCC): ICD-10-CM

## 2020-09-04 RX ORDER — GLIPIZIDE 2.5 MG/1
TABLET, EXTENDED RELEASE ORAL
Qty: 30 TABLET | Refills: 0 | OUTPATIENT
Start: 2020-09-04

## 2022-05-05 NOTE — TELEPHONE ENCOUNTER
----- Message from Jamar Kwok MD sent at 11/28/2018  9:00 AM EST -----  Please call the patient regarding his testosterone results, testosterone level is improved, continue current dose of testosterone supplementation  Impression: Hypermetropia, bilateral: H52.03. Plan: Give Rx for glass and instructed on normal adaptation period.

## (undated) DEVICE — NERVE STIMULATOR HAND HELD

## (undated) DEVICE — BULB SYRINGE,IRRIGATION WITH PROTECTIVE CAP: Brand: DOVER

## (undated) DEVICE — SYRINGE 10ML LL

## (undated) DEVICE — SUT VICRYL 3-0 18 IN J110T

## (undated) DEVICE — 3M™ STERI-STRIP™ REINFORCED ADHESIVE SKIN CLOSURES, R1547, 1/2 IN X 4 IN (12 MM X 100 MM), 6 STRIPS/ENVELOPE: Brand: 3M™ STERI-STRIP™

## (undated) DEVICE — SUT SILK 2-0 SH 30 IN K833H

## (undated) DEVICE — HARMONIC FOCUS SHEARS 9CM LENGTH + ADAPTIVE TISSUE TECHNOLOGY FOR USE WITH BLUE HAND PIECE ONLY: Brand: HARMONIC FOCUS

## (undated) DEVICE — SUT MONOCRYL 4-0 PS-2 18 IN Y496G

## (undated) DEVICE — CHLORAPREP HI-LITE 26ML ORANGE

## (undated) DEVICE — SUT SILK 3-0 18 IN A184H

## (undated) DEVICE — NEEDLE 25G X 1 1/2

## (undated) DEVICE — GLOVE SRG BIOGEL ECLIPSE 8

## (undated) DEVICE — SUT VICRYL 3-0 SH 27 IN J416H

## (undated) DEVICE — SUT SILK 2-0 SH CR/8 18 IN C012D

## (undated) DEVICE — PLUMEPEN PRO 10FT

## (undated) DEVICE — REM POLYHESIVE ADULT PATIENT RETURN ELECTRODE: Brand: VALLEYLAB

## (undated) DEVICE — MEDI-VAC YANK SUCT HNDL W/TPRD BULBOUS TIP: Brand: CARDINAL HEALTH

## (undated) DEVICE — GAUZE SPONGES,USP TYPE VII GAUZE, 12 PLY: Brand: CURITY

## (undated) DEVICE — PACK UNIVERSAL NECK

## (undated) DEVICE — SURGICEL 4 X 8

## (undated) DEVICE — MINOR PROCEDURE DRAPE: Brand: CONVERTORS

## (undated) DEVICE — SUT SILK 2-0 18 IN A185H

## (undated) DEVICE — VIOLET BRAIDED (POLYGLACTIN 910), SYNTHETIC ABSORBABLE SUTURE: Brand: COATED VICRYL

## (undated) DEVICE — LIGACLIP MCA MULTIPLE CLIP APPLIERS, 20 SMALL CLIPS: Brand: LIGACLIP

## (undated) DEVICE — 3M™ TEGADERM™ TRANSPARENT FILM DRESSING FRAME STYLE, 1626W, 4 IN X 4-3/4 IN (10 CM X 12 CM), 50/CT 4CT/CASE: Brand: 3M™ TEGADERM™

## (undated) DEVICE — 3000CC GUARDIAN II: Brand: GUARDIAN

## (undated) DEVICE — INTENDED FOR TISSUE SEPARATION, AND OTHER PROCEDURES THAT REQUIRE A SHARP SURGICAL BLADE TO PUNCTURE OR CUT.: Brand: BARD-PARKER SAFETY BLADES SIZE 15, STERILE

## (undated) DEVICE — GLOVE INDICATOR PI UNDERGLOVE SZ 8 BLUE